# Patient Record
Sex: FEMALE | Race: OTHER | NOT HISPANIC OR LATINO
[De-identification: names, ages, dates, MRNs, and addresses within clinical notes are randomized per-mention and may not be internally consistent; named-entity substitution may affect disease eponyms.]

---

## 2019-03-26 PROBLEM — Z00.00 ENCOUNTER FOR PREVENTIVE HEALTH EXAMINATION: Noted: 2019-03-26

## 2019-03-27 ENCOUNTER — APPOINTMENT (OUTPATIENT)
Dept: GASTROENTEROLOGY | Facility: CLINIC | Age: 69
End: 2019-03-27

## 2019-04-15 ENCOUNTER — APPOINTMENT (OUTPATIENT)
Dept: GASTROENTEROLOGY | Facility: CLINIC | Age: 69
End: 2019-04-15

## 2019-05-13 ENCOUNTER — APPOINTMENT (OUTPATIENT)
Dept: GASTROENTEROLOGY | Facility: CLINIC | Age: 69
End: 2019-05-13
Payer: MEDICARE

## 2019-05-13 VITALS
BODY MASS INDEX: 21.77 KG/M2 | HEART RATE: 72 BPM | HEIGHT: 59 IN | OXYGEN SATURATION: 98 % | SYSTOLIC BLOOD PRESSURE: 172 MMHG | WEIGHT: 108 LBS | DIASTOLIC BLOOD PRESSURE: 81 MMHG | RESPIRATION RATE: 14 BRPM

## 2019-05-13 DIAGNOSIS — Z12.11 ENCOUNTER FOR SCREENING FOR MALIGNANT NEOPLASM OF COLON: ICD-10-CM

## 2019-05-13 DIAGNOSIS — R11.0 NAUSEA: ICD-10-CM

## 2019-05-13 DIAGNOSIS — R12 HEARTBURN: ICD-10-CM

## 2019-05-13 DIAGNOSIS — Z80.0 FAMILY HISTORY OF MALIGNANT NEOPLASM OF DIGESTIVE ORGANS: ICD-10-CM

## 2019-05-13 PROCEDURE — 99204 OFFICE O/P NEW MOD 45 MIN: CPT

## 2019-05-13 NOTE — PHYSICAL EXAM
[General Appearance - In No Acute Distress] : in no acute distress [General Appearance - Alert] : alert [Sclera] : the sclera and conjunctiva were normal [Extraocular Movements] : extraocular movements were intact [Outer Ear] : the ears and nose were normal in appearance [Hearing Threshold Finger Rub Not Swift] : hearing was normal [Neck Appearance] : the appearance of the neck was normal [Neck Cervical Mass (___cm)] : no neck mass was observed [Auscultation Breath Sounds / Voice Sounds] : lungs were clear to auscultation bilaterally [Heart Rate And Rhythm] : heart rate was normal and rhythm regular [Heart Sounds] : normal S1 and S2 [Murmurs] : no murmurs [Heart Sounds Gallop] : no gallops [Heart Sounds Pericardial Friction Rub] : no pericardial rub [Bowel Sounds] : normal bowel sounds [Abdomen Tenderness] : non-tender [Abdomen Soft] : soft [Abdomen Mass (___ Cm)] : no abdominal mass palpated [Cervical Lymph Nodes Enlarged Posterior Bilaterally] : posterior cervical [Cervical Lymph Nodes Enlarged Anterior Bilaterally] : anterior cervical [Abnormal Walk] : normal gait [Supraclavicular Lymph Nodes Enlarged Bilaterally] : supraclavicular [Nail Clubbing] : no clubbing  or cyanosis of the fingernails [Skin Color & Pigmentation] : normal skin color and pigmentation [] : no rash [Impaired Insight] : insight and judgment were intact [Oriented To Time, Place, And Person] : oriented to person, place, and time [Affect] : the affect was normal

## 2019-05-14 NOTE — ASSESSMENT
[FreeTextEntry1] : 69 y/o woman from [place], family hx of stomach cancer, who presents requesting an upper endoscopy and colonoscopy.  Patient with nausea, intermittent heartburn, and fecal incontinence.  \par \par I will plan for an upper endoscopy under monitored anesthesia care to rule out peptic ulcer disease, H.pylori gastritis, lesion etc.  I will plan for a colonoscopy to rule out colon polyps, colorectal cancer etc. under monitored anesthesia care.  Risks such as perforation, bleeding, infection, colitis, etc and risks of anesthesia were discussed with patient with daughter translating over the phone. Patient understands and agrees to proceed with the planned procedure.  Her blood pressure was high today and she will get medical clearance.\par \par Metamucil once a day and advance to twice a day.\par

## 2019-05-14 NOTE — HISTORY OF PRESENT ILLNESS
[de-identified] : Daughter provided translation over the phone.  \par \par 67 y/o woman from [place], family hx of stomach cancer, who presents requesting an upper endoscopy and colonoscopy.  \par \par Patient reports feeling nauseas. At times has heartburn. \par \par Complains of fecal incontinence since the past couple of months.  At times has a BM every other day or at times three times a day.  Patient has had 4 vaginal births. \par \par Pt denies having abdominal pain, dysphagia, odynophagia, vomiting, fevers, chills, jaundice, loss of appetite, wt loss, hematochezia and melena.\par \par She an upper endoscopy and colonoscopy 10 years ago.  \par \par Her mother had stomach cancer.  Patient denies family hx of other GI cancers.\par \par All other review of systems are negative.  Denies cardiac symptoms.\par \par

## 2019-05-27 ENCOUNTER — TRANSCRIPTION ENCOUNTER (OUTPATIENT)
Age: 69
End: 2019-05-27

## 2019-05-28 ENCOUNTER — OUTPATIENT (OUTPATIENT)
Dept: OUTPATIENT SERVICES | Facility: HOSPITAL | Age: 69
LOS: 1 days | End: 2019-05-28
Payer: COMMERCIAL

## 2019-05-28 ENCOUNTER — APPOINTMENT (OUTPATIENT)
Dept: GASTROENTEROLOGY | Facility: HOSPITAL | Age: 69
End: 2019-05-28

## 2019-05-28 ENCOUNTER — RESULT REVIEW (OUTPATIENT)
Age: 69
End: 2019-05-28

## 2019-05-28 DIAGNOSIS — Z12.11 ENCOUNTER FOR SCREENING FOR MALIGNANT NEOPLASM OF COLON: ICD-10-CM

## 2019-05-28 DIAGNOSIS — K25.9 GASTRIC ULCER, UNSPECIFIED AS ACUTE OR CHRONIC, W/OUT HEMORRHAGE OR PERFORATION: ICD-10-CM

## 2019-05-28 DIAGNOSIS — R12 HEARTBURN: ICD-10-CM

## 2019-05-28 PROCEDURE — 45378 DIAGNOSTIC COLONOSCOPY: CPT

## 2019-05-28 PROCEDURE — 43239 EGD BIOPSY SINGLE/MULTIPLE: CPT

## 2019-05-28 PROCEDURE — 82962 GLUCOSE BLOOD TEST: CPT

## 2019-05-28 PROCEDURE — 45378 DIAGNOSTIC COLONOSCOPY: CPT | Mod: 33

## 2020-12-21 PROBLEM — Z12.11 ENCOUNTER FOR SCREENING COLONOSCOPY: Status: RESOLVED | Noted: 2019-05-13 | Resolved: 2020-12-21

## 2023-12-16 ENCOUNTER — INPATIENT (INPATIENT)
Facility: HOSPITAL | Age: 73
LOS: 2 days | Discharge: HOME | DRG: 638 | End: 2023-12-19
Attending: HOSPITALIST | Admitting: INTERNAL MEDICINE
Payer: MEDICARE

## 2023-12-16 VITALS
OXYGEN SATURATION: 98 % | DIASTOLIC BLOOD PRESSURE: 65 MMHG | RESPIRATION RATE: 18 BRPM | SYSTOLIC BLOOD PRESSURE: 164 MMHG | HEART RATE: 88 BPM | TEMPERATURE: 98 F

## 2023-12-16 DIAGNOSIS — E78.5 HYPERLIPIDEMIA, UNSPECIFIED: ICD-10-CM

## 2023-12-16 DIAGNOSIS — E87.1 HYPO-OSMOLALITY AND HYPONATREMIA: ICD-10-CM

## 2023-12-16 DIAGNOSIS — I10 ESSENTIAL (PRIMARY) HYPERTENSION: ICD-10-CM

## 2023-12-16 DIAGNOSIS — E11.9 TYPE 2 DIABETES MELLITUS WITHOUT COMPLICATIONS: ICD-10-CM

## 2023-12-16 DIAGNOSIS — E03.9 HYPOTHYROIDISM, UNSPECIFIED: ICD-10-CM

## 2023-12-16 LAB
ACETONE SERPL-MCNC: NEGATIVE — SIGNIFICANT CHANGE UP
ACETONE SERPL-MCNC: NEGATIVE — SIGNIFICANT CHANGE UP
ALBUMIN SERPL ELPH-MCNC: 4.1 G/DL — SIGNIFICANT CHANGE UP (ref 3.3–5.2)
ALBUMIN SERPL ELPH-MCNC: 4.1 G/DL — SIGNIFICANT CHANGE UP (ref 3.3–5.2)
ALP SERPL-CCNC: 66 U/L — SIGNIFICANT CHANGE UP (ref 40–120)
ALP SERPL-CCNC: 66 U/L — SIGNIFICANT CHANGE UP (ref 40–120)
ALT FLD-CCNC: 9 U/L — SIGNIFICANT CHANGE UP
ALT FLD-CCNC: 9 U/L — SIGNIFICANT CHANGE UP
ANION GAP SERPL CALC-SCNC: 15 MMOL/L — SIGNIFICANT CHANGE UP (ref 5–17)
ANION GAP SERPL CALC-SCNC: 15 MMOL/L — SIGNIFICANT CHANGE UP (ref 5–17)
APPEARANCE UR: CLEAR — SIGNIFICANT CHANGE UP
APPEARANCE UR: CLEAR — SIGNIFICANT CHANGE UP
AST SERPL-CCNC: 13 U/L — SIGNIFICANT CHANGE UP
AST SERPL-CCNC: 13 U/L — SIGNIFICANT CHANGE UP
BASE EXCESS BLDV CALC-SCNC: -0.2 MMOL/L — SIGNIFICANT CHANGE UP (ref -2–3)
BASE EXCESS BLDV CALC-SCNC: -0.2 MMOL/L — SIGNIFICANT CHANGE UP (ref -2–3)
BASOPHILS # BLD AUTO: 0.08 K/UL — SIGNIFICANT CHANGE UP (ref 0–0.2)
BASOPHILS # BLD AUTO: 0.08 K/UL — SIGNIFICANT CHANGE UP (ref 0–0.2)
BASOPHILS NFR BLD AUTO: 0.9 % — SIGNIFICANT CHANGE UP (ref 0–2)
BASOPHILS NFR BLD AUTO: 0.9 % — SIGNIFICANT CHANGE UP (ref 0–2)
BILIRUB SERPL-MCNC: 0.4 MG/DL — SIGNIFICANT CHANGE UP (ref 0.4–2)
BILIRUB SERPL-MCNC: 0.4 MG/DL — SIGNIFICANT CHANGE UP (ref 0.4–2)
BILIRUB UR-MCNC: NEGATIVE — SIGNIFICANT CHANGE UP
BILIRUB UR-MCNC: NEGATIVE — SIGNIFICANT CHANGE UP
BUN SERPL-MCNC: 15.5 MG/DL — SIGNIFICANT CHANGE UP (ref 8–20)
BUN SERPL-MCNC: 15.5 MG/DL — SIGNIFICANT CHANGE UP (ref 8–20)
CALCIUM SERPL-MCNC: 9.1 MG/DL — SIGNIFICANT CHANGE UP (ref 8.4–10.5)
CALCIUM SERPL-MCNC: 9.1 MG/DL — SIGNIFICANT CHANGE UP (ref 8.4–10.5)
CHLORIDE SERPL-SCNC: 85 MMOL/L — LOW (ref 96–108)
CHLORIDE SERPL-SCNC: 85 MMOL/L — LOW (ref 96–108)
CO2 SERPL-SCNC: 22 MMOL/L — SIGNIFICANT CHANGE UP (ref 22–29)
CO2 SERPL-SCNC: 22 MMOL/L — SIGNIFICANT CHANGE UP (ref 22–29)
COLOR SPEC: YELLOW — SIGNIFICANT CHANGE UP
COLOR SPEC: YELLOW — SIGNIFICANT CHANGE UP
CREAT ?TM UR-MCNC: 12 MG/DL — SIGNIFICANT CHANGE UP
CREAT ?TM UR-MCNC: 12 MG/DL — SIGNIFICANT CHANGE UP
CREAT SERPL-MCNC: 0.59 MG/DL — SIGNIFICANT CHANGE UP (ref 0.5–1.3)
CREAT SERPL-MCNC: 0.59 MG/DL — SIGNIFICANT CHANGE UP (ref 0.5–1.3)
DIFF PNL FLD: NEGATIVE — SIGNIFICANT CHANGE UP
DIFF PNL FLD: NEGATIVE — SIGNIFICANT CHANGE UP
EGFR: 94 ML/MIN/1.73M2 — SIGNIFICANT CHANGE UP
EGFR: 94 ML/MIN/1.73M2 — SIGNIFICANT CHANGE UP
EOSINOPHIL # BLD AUTO: 0.15 K/UL — SIGNIFICANT CHANGE UP (ref 0–0.5)
EOSINOPHIL # BLD AUTO: 0.15 K/UL — SIGNIFICANT CHANGE UP (ref 0–0.5)
EOSINOPHIL NFR BLD AUTO: 1.6 % — SIGNIFICANT CHANGE UP (ref 0–6)
EOSINOPHIL NFR BLD AUTO: 1.6 % — SIGNIFICANT CHANGE UP (ref 0–6)
GAS PNL BLDV: SIGNIFICANT CHANGE UP
GAS PNL BLDV: SIGNIFICANT CHANGE UP
GLUCOSE BLDC GLUCOMTR-MCNC: 203 MG/DL — HIGH (ref 70–99)
GLUCOSE BLDC GLUCOMTR-MCNC: 203 MG/DL — HIGH (ref 70–99)
GLUCOSE SERPL-MCNC: 264 MG/DL — HIGH (ref 70–99)
GLUCOSE SERPL-MCNC: 264 MG/DL — HIGH (ref 70–99)
GLUCOSE UR QL: 500 MG/DL
GLUCOSE UR QL: 500 MG/DL
HCO3 BLDV-SCNC: 25 MMOL/L — SIGNIFICANT CHANGE UP (ref 22–29)
HCO3 BLDV-SCNC: 25 MMOL/L — SIGNIFICANT CHANGE UP (ref 22–29)
HCT VFR BLD CALC: 34.1 % — LOW (ref 34.5–45)
HCT VFR BLD CALC: 34.1 % — LOW (ref 34.5–45)
HGB BLD-MCNC: 11.5 G/DL — SIGNIFICANT CHANGE UP (ref 11.5–15.5)
HGB BLD-MCNC: 11.5 G/DL — SIGNIFICANT CHANGE UP (ref 11.5–15.5)
IMM GRANULOCYTES NFR BLD AUTO: 0.4 % — SIGNIFICANT CHANGE UP (ref 0–0.9)
IMM GRANULOCYTES NFR BLD AUTO: 0.4 % — SIGNIFICANT CHANGE UP (ref 0–0.9)
KETONES UR-MCNC: NEGATIVE MG/DL — SIGNIFICANT CHANGE UP
KETONES UR-MCNC: NEGATIVE MG/DL — SIGNIFICANT CHANGE UP
LEUKOCYTE ESTERASE UR-ACNC: NEGATIVE — SIGNIFICANT CHANGE UP
LEUKOCYTE ESTERASE UR-ACNC: NEGATIVE — SIGNIFICANT CHANGE UP
LYMPHOCYTES # BLD AUTO: 1.24 K/UL — SIGNIFICANT CHANGE UP (ref 1–3.3)
LYMPHOCYTES # BLD AUTO: 1.24 K/UL — SIGNIFICANT CHANGE UP (ref 1–3.3)
LYMPHOCYTES # BLD AUTO: 13.3 % — SIGNIFICANT CHANGE UP (ref 13–44)
LYMPHOCYTES # BLD AUTO: 13.3 % — SIGNIFICANT CHANGE UP (ref 13–44)
MAGNESIUM SERPL-MCNC: 1.5 MG/DL — LOW (ref 1.6–2.6)
MAGNESIUM SERPL-MCNC: 1.5 MG/DL — LOW (ref 1.6–2.6)
MCHC RBC-ENTMCNC: 26.6 PG — LOW (ref 27–34)
MCHC RBC-ENTMCNC: 26.6 PG — LOW (ref 27–34)
MCHC RBC-ENTMCNC: 33.7 GM/DL — SIGNIFICANT CHANGE UP (ref 32–36)
MCHC RBC-ENTMCNC: 33.7 GM/DL — SIGNIFICANT CHANGE UP (ref 32–36)
MCV RBC AUTO: 78.9 FL — LOW (ref 80–100)
MCV RBC AUTO: 78.9 FL — LOW (ref 80–100)
MONOCYTES # BLD AUTO: 0.62 K/UL — SIGNIFICANT CHANGE UP (ref 0–0.9)
MONOCYTES # BLD AUTO: 0.62 K/UL — SIGNIFICANT CHANGE UP (ref 0–0.9)
MONOCYTES NFR BLD AUTO: 6.6 % — SIGNIFICANT CHANGE UP (ref 2–14)
MONOCYTES NFR BLD AUTO: 6.6 % — SIGNIFICANT CHANGE UP (ref 2–14)
NEUTROPHILS # BLD AUTO: 7.2 K/UL — SIGNIFICANT CHANGE UP (ref 1.8–7.4)
NEUTROPHILS # BLD AUTO: 7.2 K/UL — SIGNIFICANT CHANGE UP (ref 1.8–7.4)
NEUTROPHILS NFR BLD AUTO: 77.2 % — HIGH (ref 43–77)
NEUTROPHILS NFR BLD AUTO: 77.2 % — HIGH (ref 43–77)
NITRITE UR-MCNC: NEGATIVE — SIGNIFICANT CHANGE UP
NITRITE UR-MCNC: NEGATIVE — SIGNIFICANT CHANGE UP
OSMOLALITY UR: 225 MOSM/KG — LOW (ref 300–1000)
OSMOLALITY UR: 225 MOSM/KG — LOW (ref 300–1000)
PCO2 BLDV: 41 MMHG — SIGNIFICANT CHANGE UP (ref 39–42)
PCO2 BLDV: 41 MMHG — SIGNIFICANT CHANGE UP (ref 39–42)
PH BLDV: 7.39 — SIGNIFICANT CHANGE UP (ref 7.32–7.43)
PH BLDV: 7.39 — SIGNIFICANT CHANGE UP (ref 7.32–7.43)
PH UR: 7.5 — SIGNIFICANT CHANGE UP (ref 5–8)
PH UR: 7.5 — SIGNIFICANT CHANGE UP (ref 5–8)
PLATELET # BLD AUTO: 344 K/UL — SIGNIFICANT CHANGE UP (ref 150–400)
PLATELET # BLD AUTO: 344 K/UL — SIGNIFICANT CHANGE UP (ref 150–400)
PO2 BLDV: 67 MMHG — HIGH (ref 25–45)
PO2 BLDV: 67 MMHG — HIGH (ref 25–45)
POTASSIUM SERPL-MCNC: 4.2 MMOL/L — SIGNIFICANT CHANGE UP (ref 3.5–5.3)
POTASSIUM SERPL-MCNC: 4.2 MMOL/L — SIGNIFICANT CHANGE UP (ref 3.5–5.3)
POTASSIUM SERPL-SCNC: 4.2 MMOL/L — SIGNIFICANT CHANGE UP (ref 3.5–5.3)
POTASSIUM SERPL-SCNC: 4.2 MMOL/L — SIGNIFICANT CHANGE UP (ref 3.5–5.3)
POTASSIUM UR-SCNC: 19 MMOL/L — SIGNIFICANT CHANGE UP
POTASSIUM UR-SCNC: 19 MMOL/L — SIGNIFICANT CHANGE UP
PROT SERPL-MCNC: 6.6 G/DL — SIGNIFICANT CHANGE UP (ref 6.6–8.7)
PROT SERPL-MCNC: 6.6 G/DL — SIGNIFICANT CHANGE UP (ref 6.6–8.7)
PROT UR-MCNC: NEGATIVE MG/DL — SIGNIFICANT CHANGE UP
PROT UR-MCNC: NEGATIVE MG/DL — SIGNIFICANT CHANGE UP
RBC # BLD: 4.32 M/UL — SIGNIFICANT CHANGE UP (ref 3.8–5.2)
RBC # BLD: 4.32 M/UL — SIGNIFICANT CHANGE UP (ref 3.8–5.2)
RBC # FLD: 13.2 % — SIGNIFICANT CHANGE UP (ref 10.3–14.5)
RBC # FLD: 13.2 % — SIGNIFICANT CHANGE UP (ref 10.3–14.5)
SAO2 % BLDV: 94.2 % — SIGNIFICANT CHANGE UP
SAO2 % BLDV: 94.2 % — SIGNIFICANT CHANGE UP
SODIUM SERPL-SCNC: 122 MMOL/L — LOW (ref 135–145)
SODIUM SERPL-SCNC: 122 MMOL/L — LOW (ref 135–145)
SODIUM UR-SCNC: 65 MMOL/L — SIGNIFICANT CHANGE UP
SODIUM UR-SCNC: 65 MMOL/L — SIGNIFICANT CHANGE UP
SP GR SPEC: 1.01 — SIGNIFICANT CHANGE UP (ref 1–1.03)
SP GR SPEC: 1.01 — SIGNIFICANT CHANGE UP (ref 1–1.03)
T4 AB SER-ACNC: 7.4 UG/DL — SIGNIFICANT CHANGE UP (ref 4.5–12)
T4 AB SER-ACNC: 7.4 UG/DL — SIGNIFICANT CHANGE UP (ref 4.5–12)
TSH SERPL-MCNC: 3.11 UIU/ML — SIGNIFICANT CHANGE UP (ref 0.27–4.2)
TSH SERPL-MCNC: 3.11 UIU/ML — SIGNIFICANT CHANGE UP (ref 0.27–4.2)
UROBILINOGEN FLD QL: 0.2 MG/DL — SIGNIFICANT CHANGE UP (ref 0.2–1)
UROBILINOGEN FLD QL: 0.2 MG/DL — SIGNIFICANT CHANGE UP (ref 0.2–1)
WBC # BLD: 9.33 K/UL — SIGNIFICANT CHANGE UP (ref 3.8–10.5)
WBC # BLD: 9.33 K/UL — SIGNIFICANT CHANGE UP (ref 3.8–10.5)
WBC # FLD AUTO: 9.33 K/UL — SIGNIFICANT CHANGE UP (ref 3.8–10.5)
WBC # FLD AUTO: 9.33 K/UL — SIGNIFICANT CHANGE UP (ref 3.8–10.5)

## 2023-12-16 PROCEDURE — 99285 EMERGENCY DEPT VISIT HI MDM: CPT

## 2023-12-16 PROCEDURE — 99223 1ST HOSP IP/OBS HIGH 75: CPT

## 2023-12-16 PROCEDURE — 93010 ELECTROCARDIOGRAM REPORT: CPT

## 2023-12-16 RX ORDER — GLUCAGON INJECTION, SOLUTION 0.5 MG/.1ML
1 INJECTION, SOLUTION SUBCUTANEOUS ONCE
Refills: 0 | Status: DISCONTINUED | OUTPATIENT
Start: 2023-12-16 | End: 2023-12-19

## 2023-12-16 RX ORDER — INSULIN DETEMIR 100/ML (3)
10 INSULIN PEN (ML) SUBCUTANEOUS ONCE
Refills: 0 | Status: COMPLETED | OUTPATIENT
Start: 2023-12-16 | End: 2023-12-16

## 2023-12-16 RX ORDER — INSULIN LISPRO 100/ML
VIAL (ML) SUBCUTANEOUS
Refills: 0 | Status: DISCONTINUED | OUTPATIENT
Start: 2023-12-16 | End: 2023-12-18

## 2023-12-16 RX ORDER — MAGNESIUM SULFATE 500 MG/ML
2 VIAL (ML) INJECTION ONCE
Refills: 0 | Status: COMPLETED | OUTPATIENT
Start: 2023-12-16 | End: 2023-12-16

## 2023-12-16 RX ORDER — INSULIN GLARGINE 100 [IU]/ML
10 INJECTION, SOLUTION SUBCUTANEOUS AT BEDTIME
Refills: 0 | Status: DISCONTINUED | OUTPATIENT
Start: 2023-12-17 | End: 2023-12-17

## 2023-12-16 RX ORDER — SODIUM CHLORIDE 9 MG/ML
1000 INJECTION, SOLUTION INTRAVENOUS
Refills: 0 | Status: DISCONTINUED | OUTPATIENT
Start: 2023-12-16 | End: 2023-12-19

## 2023-12-16 RX ORDER — TRAZODONE HCL 50 MG
50 TABLET ORAL AT BEDTIME
Refills: 0 | Status: DISCONTINUED | OUTPATIENT
Start: 2023-12-16 | End: 2023-12-19

## 2023-12-16 RX ORDER — ENOXAPARIN SODIUM 100 MG/ML
40 INJECTION SUBCUTANEOUS EVERY 24 HOURS
Refills: 0 | Status: DISCONTINUED | OUTPATIENT
Start: 2023-12-16 | End: 2023-12-19

## 2023-12-16 RX ORDER — AMLODIPINE BESYLATE 2.5 MG/1
10 TABLET ORAL DAILY
Refills: 0 | Status: DISCONTINUED | OUTPATIENT
Start: 2023-12-16 | End: 2023-12-19

## 2023-12-16 RX ORDER — DEXTROSE 50 % IN WATER 50 %
15 SYRINGE (ML) INTRAVENOUS ONCE
Refills: 0 | Status: DISCONTINUED | OUTPATIENT
Start: 2023-12-16 | End: 2023-12-19

## 2023-12-16 RX ORDER — LEVOTHYROXINE SODIUM 125 MCG
100 TABLET ORAL
Refills: 0 | Status: DISCONTINUED | OUTPATIENT
Start: 2023-12-16 | End: 2023-12-19

## 2023-12-16 RX ORDER — SODIUM CHLORIDE 9 MG/ML
1000 INJECTION INTRAMUSCULAR; INTRAVENOUS; SUBCUTANEOUS
Refills: 0 | Status: DISCONTINUED | OUTPATIENT
Start: 2023-12-16 | End: 2023-12-18

## 2023-12-16 RX ORDER — SODIUM CHLORIDE 9 MG/ML
1000 INJECTION, SOLUTION INTRAVENOUS ONCE
Refills: 0 | Status: COMPLETED | OUTPATIENT
Start: 2023-12-16 | End: 2023-12-16

## 2023-12-16 RX ORDER — DEXTROSE 50 % IN WATER 50 %
25 SYRINGE (ML) INTRAVENOUS ONCE
Refills: 0 | Status: DISCONTINUED | OUTPATIENT
Start: 2023-12-16 | End: 2023-12-19

## 2023-12-16 RX ORDER — LEVOTHYROXINE SODIUM 125 MCG
50 TABLET ORAL
Refills: 0 | Status: DISCONTINUED | OUTPATIENT
Start: 2023-12-16 | End: 2023-12-19

## 2023-12-16 RX ORDER — DEXTROSE 50 % IN WATER 50 %
12.5 SYRINGE (ML) INTRAVENOUS ONCE
Refills: 0 | Status: DISCONTINUED | OUTPATIENT
Start: 2023-12-16 | End: 2023-12-19

## 2023-12-16 RX ADMIN — SODIUM CHLORIDE 1000 MILLILITER(S): 9 INJECTION, SOLUTION INTRAVENOUS at 15:52

## 2023-12-16 RX ADMIN — Medication 25 GRAM(S): at 20:58

## 2023-12-16 RX ADMIN — SODIUM CHLORIDE 100 MILLILITER(S): 9 INJECTION INTRAMUSCULAR; INTRAVENOUS; SUBCUTANEOUS at 23:24

## 2023-12-16 RX ADMIN — Medication 10 UNIT(S): at 21:32

## 2023-12-16 NOTE — ED ADULT TRIAGE NOTE - CHIEF COMPLAINT QUOTE
Pt lives in [place] (one [place] ago) and is c/o tingling and pain to the hands and feet. PMHx DM2, check her BG at home but states that she does not take her insulin daily.  in triage. Daughter is concerned for HTN, BP 170s at home.

## 2023-12-16 NOTE — H&P ADULT - NSHPPHYSICALEXAM_GEN_ALL_CORE
PHYSICAL EXAM:  Vital Signs:  Vital Signs (24 Hrs):  T(C): 36.4 (12-16-23 @ 13:13), Max: 36.4 (12-16-23 @ 13:13)  HR: 88 (12-16-23 @ 13:13) (88 - 88)  BP: 164/65 (12-16-23 @ 13:13) (164/65 - 164/65)  RR: 18 (12-16-23 @ 13:13) (18 - 18)  SpO2: 98% (12-16-23 @ 13:13) (98% - 98%)    General: well appearing/toxic appearing/chronically ill appearing and in no acute distress   HENT: atraumatic, normocephalic, oropharynx pink and moist, sinuses nontender, normal nasal mucosa/turbinates, thyroid not enlarged or tender, no radiating carotid murmur or bruit and no masses; no JVD  Eyes: pupil equally round and reactive to light (PERRLA) bilaterally, extra-ocular muscle intact (EOMI) bilaterally, lids/conjunctiva normal bilaterally and anicteric bilaterally  Neck: normal, supple, no adenopathy and thyroid normal in size, no nodules or tenderness  CV: normal s1, s2 , regular rhythm, no murmurs, no rubs and no gallops  Lungs: clear to auscultation no w/r/r  Abd: normal bowel sounds, no hepatosplenomegaly, non-tender, non-distended and no masses  Musculoskeletal: no clubbing, cyanosis and full range of motion of joints: right upper extremity, left upper extremity, right lower extremity and left lower extremity;  2+ Peripheral Pulses, No clubbing, cyanosis, or edema  Neuro: alert, awake & oriented times three (AA&O x 3), cranial Nerves II-XII intact and normal strength  Psych: normal judgment and insight, normal mood/affect and non-anxious  Vascular: 2+ radial and dorsalis pedis pulses B/L equal and symmetric  Skin: no rash, warm and dry PHYSICAL EXAM:  Vital Signs (24 Hrs):  T(C): 36.4 (12-16-23 @ 13:13), Max: 36.4 (12-16-23 @ 13:13)  HR: 88 (12-16-23 @ 13:13) (88 - 88)  BP: 164/65 (12-16-23 @ 13:13) (164/65 - 164/65)  RR: 18 (12-16-23 @ 13:13) (18 - 18)  SpO2: 98% (12-16-23 @ 13:13) (98% - 98%)    General: well appearing and in no acute distress, thin  HENT: atraumatic, normocephalic, oropharynx pink and moist, sinuses nontender, normal nasal mucosa/turbinates, thyroid not enlarged or tender, no radiating carotid murmur or bruit and no masses; no JVD  Eyes: pupil equally round and reactive to light (PERRLA) bilaterally, extra-ocular muscle intact (EOMI) bilaterally, lids/conjunctiva normal bilaterally and anicteric bilaterally  Neck: normal, supple, no adenopathy and thyroid normal in size, no nodules or tenderness  CV: normal s1, s2 , regular rhythm, no murmurs, no rubs and no gallops  Lungs: clear to auscultation no w/r/r  Abd: normal bowel sounds, no hepatosplenomegaly, non-tender, non-distended and no masses  Musculoskeletal: no clubbing, cyanosis and full range of motion of joints: right upper extremity, left upper extremity, right lower extremity and left lower extremity;  2+ Peripheral Pulses, No clubbing, cyanosis, or edema  Neuro: alert, awake & oriented times three (AA&O x 3), cranial Nerves II-XII intact and normal strength  Psych: normal judgment and insight, normal mood/affect and non-anxious  Vascular: 2+ radial and dorsalis pedis pulses B/L equal and symmetric  Skin: no rash, warm and dry

## 2023-12-16 NOTE — ED ADULT NURSE REASSESSMENT NOTE - NS ED NURSE REASSESS COMMENT FT1
Assumed care of patient and resting on stretcher quietly, patient and family informed plan  of care and states understanding. pt pending UA sample.

## 2023-12-16 NOTE — ED ADULT NURSE NOTE - NSFALLUNIVINTERV_ED_ALL_ED
Bed/Stretcher in lowest position, wheels locked, appropriate side rails in place/Call bell, personal items and telephone in reach/Instruct patient to call for assistance before getting out of bed/chair/stretcher/Non-slip footwear applied when patient is off stretcher/[place] to call system/Physically safe environment - no spills, clutter or unnecessary equipment/Purposeful proactive rounding/Room/bathroom lighting operational, light cord in reach Bed/Stretcher in lowest position, wheels locked, appropriate side rails in place/Call bell, personal items and telephone in reach/Instruct patient to call for assistance before getting out of bed/chair/stretcher/Non-slip footwear applied when patient is off stretcher/[place] to call system/Physically safe environment - no spills, clutter or unnecessary equipment/Purposeful proactive rounding/Room/bathroom lighting operational, light cord in reach

## 2023-12-16 NOTE — H&P ADULT - NSHPLABSRESULTS_GEN_ALL_CORE
labs personally reviewed and pertinent [facility] documents/labs/diagnostics reviewed                         11.5   9.33  )-----------( 344      ( 16 Dec 2023 16:00 )             34.1       12    122<L>  |  85<L>  |  15.5  ----------------------------<  264<H>  4.2   |  22.0  |  0.59    Ca    9.1      16 Dec 2023 16:00  Mg     1.5         TPro  6.6  /  Alb  4.1  /  TBili  0.4  /  DBili  x   /  AST  13  /  ALT  9   /  AlkPhos  66      16:00 - VBG - pH: 7.390 | pCO2: 41    | pO2: 67    | Lactate:          Urinalysis Basic - ( 16 Dec 2023 18:00 )    Color: Yellow / Appearance: Clear / S.007 / pH: x  Gluc: x / Ketone: Negative mg/dL  / Bili: Negative / Urobili: 0.2 mg/dL   Blood: x / Protein: Negative mg/dL / Nitrite: Negative   Leuk Esterase: Negative / RBC: x / WBC x   Sq Epi: x / Non Sq Epi: x / Bacteria: x    EKG: read by me, NSR at 71 bpm, no ST changes, TWI in lead 3 and V3 labs personally reviewed and pertinent [facility] documents/labs/diagnostics reviewed                         11.5   9.33  )-----------( 344      ( 16 Dec 2023 16:00 )             34.1       12    122<L>  |  85<L>  |  15.5  ----------------------------<  264<H>  4.2   |  22.0  |  0.59    Ca    9.1      16 Dec 2023 16:00  Mg     1.5         TPro  6.6  /  Alb  4.1  /  TBili  0.4  /  DBili  x   /  AST  13  /  ALT  9   /  AlkPhos  66      16:00 - VBG - pH: 7.390 | pCO2: 41    | pO2: 67    | Lactate:          Urinalysis Basic - ( 16 Dec 2023 18:00 )    Color: Yellow / Appearance: Clear / S.007 / pH: x  Gluc: x / Ketone: Negative mg/dL  / Bili: Negative / Urobili: 0.2 mg/dL   Blood: x / Protein: Negative mg/dL / Nitrite: Negative   Leuk Esterase: Negative / RBC: x / WBC x   Sq Epi: x / Non Sq Epi: x / Bacteria: x    EKG: read by me, NSR at 71 bpm, no ST changes, TWI in lead 3 and V3

## 2023-12-16 NOTE — ED PROVIDER NOTE - OBJECTIVE STATEMENT
74yo female with pmh of HTN, DM, HLD, depression presents with hyperglycemia, parasthesias, urinary frequency, generalized weakness. Pt for the past 2 months has not been taking her insulin because she states the needle sticks were causing her skin to get hard in the area she stuck herself. Pt for the past month with generalized weakness, parasthesias to the hands and feet and inc urination. Pt also stating her BP has been high at home at times 180s. Pt's  passed away a month ago, and daughter states since then patient feeling worse. Pt denies fevers/chills, ha, loc, focal neuro deficits, cp/sob/palp, cough, abd pain/n/v/d, dysuria, hematuria and sick contacts.

## 2023-12-16 NOTE — ED PROVIDER NOTE - CARE PLAN
1 Principal Discharge DX:	Hyponatremia  Secondary Diagnosis:	Hyperglycemia  Secondary Diagnosis:	Hypomagnesemia

## 2023-12-16 NOTE — H&P ADULT - NSICDXPASTMEDICALHX_GEN_ALL_CORE_FT
PAST MEDICAL HISTORY:  Benign essential HTN     HLD (hyperlipidemia)     Type 2 diabetes mellitus      PAST MEDICAL HISTORY:  Benign essential HTN     HLD (hyperlipidemia)     Primary hypothyroidism     Type 2 diabetes mellitus

## 2023-12-16 NOTE — H&P ADULT - NSHPREVIEWOFSYSTEMS_GEN_ALL_CORE
She denies any fever, chills, changes in vision, changes in hearing, headaches, cough, sore throat, rhinorrhea, chest pain, SOB, palpitations, abdominal pain, nausea, vomiting, dysuria, diarrhea, weakness, lethargy, paresthesias, lightheadedness, leg swelling, orthopnea, PND, sick contact or recent travel. At baseline, he/she does not use any devices to assist with ambulation. She denies any fever, chills, cough, sore throat, rhinorrhea, chest pain, SOB, abdominal pain, nausea, vomiting, dysuria, diarrhea, weakness, lethargy, paresthesias, lightheadedness, leg swelling, orthopnea, PND, sick contact or recent travel. At baseline, he/she does not use any devices to assist with ambulation.

## 2023-12-16 NOTE — ED PROVIDER NOTE - CLINICAL SUMMARY MEDICAL DECISION MAKING FREE TEXT BOX
74yo female with pmh of HTN, DM, HLD, depression presents with hyperglycemia, parasthesias, urinary frequency, generalized weakness. 72yo female with pmh of HTN, DM, HLD, depression presents with hyperglycemia, parasthesias, urinary frequency, generalized weakness. 72yo female with pmh of HTN, DM, HLD, depression presents with hyperglycemia, parasthesias, urinary frequency, generalized weakness. Pt with hypona as well as hypomag, given fluids and mag, pt hyperglycemic home insulin ordered, admit for further managment

## 2023-12-16 NOTE — H&P ADULT - HISTORY OF PRESENT ILLNESS
This is a 73 year old Portuguese speaking female w/ PMHx of HTN, HLD and DM2 presents for evaluation of  This is a 73 year old Upper sorbian speaking female w/ PMHx of HTN, HLD and DM2 presents for evaluation of  This is a 73 year old Iraqi speaking female - daughter wishes to translate w/ PMHx of hypothyroidism, nephrolithiasis /P lithotripsy), HTN, HLD and DM2 - noncompliant with meds X 3 months presents for evaluation of sudden onset palpitations, lasting about 1 hour.  Associated with B/L feet paresthesias > hand paresthesias.  She denies any previous episodes, now resolved.  States she has been prescribed levemir X 6 months, however discontinued 3 months ago as she developed nodules at injection sites.  Also was placed on levemir 6 months prior as her hyperglycemia was uncontrolled on "3 pills" - unknown which.  +dizziness - which she currently has, and has continued X 1 month.  +headache B/L radiating to her temporal area B/L, intermittently X 1 month.  Tried "over the counter meds" unknown which without relief.  +nausea.  +letargy X 2 days.  +10 pound weight loss unintentional weight loss.   Of note her  passed away unexpectedly 1 month prior.   +frequency of urination 1 month prior, was treated with unknown antibiotics without relief.  Of note patient is visiting from [place] X 1 month.   This is a 73 year old Indonesian speaking female - daughter wishes to translate w/ PMHx of hypothyroidism, nephrolithiasis /P lithotripsy), HTN, HLD and DM2 - noncompliant with meds X 3 months presents for evaluation of sudden onset palpitations, lasting about 1 hour.  Associated with B/L feet paresthesias > hand paresthesias.  She denies any previous episodes, now resolved.  States she has been prescribed levemir X 6 months, however discontinued 3 months ago as she developed nodules at injection sites.  Also was placed on levemir 6 months prior as her hyperglycemia was uncontrolled on "3 pills" - unknown which.  +dizziness - which she currently has, and has continued X 1 month.  +headache B/L radiating to her temporal area B/L, intermittently X 1 month.  Tried "over the counter meds" unknown which without relief.  +nausea.  +letargy X 2 days.  +10 pound weight loss unintentional weight loss.   Of note her  passed away unexpectedly 1 month prior.   +frequency of urination 1 month prior, was treated with unknown antibiotics without relief.  Of note patient is visiting from [place] X 1 month.

## 2023-12-16 NOTE — H&P ADULT - ASSESSMENT
ASSESSMENT:  ***    PLAN:  1.Uncontolled DM - likely secondary to noncompliance w/ hx of DM2  -afebrile, no leukocytosis  -EKG as above  -S/P 1L LR in ED, will switch to NS for maintenance  -LFTs within normal limits  -initial glucose 306  ---> 264 on serum --->203   -S/P 10 units levemir X 1 in ER  -sodium 122, will f/u serum osm, urine osm, urine sodium and urine cr  -chloride 85  -LFTs within normal limits  -TSH 3.11  -T4 7.4  -magnesium 1.5 S/P 2g IV magnesium X 1 in ER, will f/u repeat in AM  -AG 15  -will f/u acetone  -7.39/41/67/25  -U/A as above  -will continue fall risk precautions  -will f/u HBA1c    2.DVT PPx  -will place B/L LE SCDs ASSESSMENT:  This is a 73 year old Greek speaking female - daughter wishes to translate w/ PMHx of hypothyroidism, nephrolithiasis /P lithotripsy), HTN, HLD and DM2 - noncompliant with meds X 3 months presents for evaluation of sudden onset palpitations, lasting about 1 hour.      PLAN:  1.Uncontolled DM - likely secondary to noncompliance w/ hx of DM2  -afebrile, no leukocytosis  -EKG as above  -S/P 1L LR in ED, will switch to NS for maintenance  -LFTs within normal limits  -initial glucose 306  ---> 264 on serum --->203   -S/P 10 units levemir X 1 in ER  -sodium 122, will f/u serum osm, urine osm, urine sodium and urine cr  -chloride 85  -LFTs within normal limits  -TSH 3.11  -T4 7.4  -magnesium 1.5 S/P 2g IV magnesium X 1 in ER, will f/u repeat in AM  -AG 15  -will f/u acetone  -7.39/41/67/25  -U/A as above  -will f/u HBA1c  -is on diamicron gliclizide 60mg QD and janumet  bid - will hold for now  -will continue levemir 10units QHS  -will place on fall precautions  -will f/u orthostats  -will f/u initial troponin  -will f/u CXRAY  -will f/u vitamin b12    2.Hypothyroidism  -will continue levothyroxine 100mg biw and 50mg 5 x week    3.HTN  -will continue norvasc 10mg QD    4.HLD  -will f/u lipid panel    5.DVT PPx  -will place B/L LE SCDs and start lovenox 40mg SQ QD ASSESSMENT:  This is a 73 year old Lao speaking female - daughter wishes to translate w/ PMHx of hypothyroidism, nephrolithiasis /P lithotripsy), HTN, HLD and DM2 - noncompliant with meds X 3 months presents for evaluation of sudden onset palpitations, lasting about 1 hour.      PLAN:  1.Uncontolled DM - likely secondary to noncompliance w/ hx of DM2  -afebrile, no leukocytosis  -EKG as above  -S/P 1L LR in ED, will switch to NS for maintenance  -LFTs within normal limits  -initial glucose 306  ---> 264 on serum --->203   -S/P 10 units levemir X 1 in ER  -sodium 122, will f/u serum osm, urine osm, urine sodium and urine cr  -chloride 85  -LFTs within normal limits  -TSH 3.11  -T4 7.4  -magnesium 1.5 S/P 2g IV magnesium X 1 in ER, will f/u repeat in AM  -AG 15  -will f/u acetone  -7.39/41/67/25  -U/A as above  -will f/u HBA1c  -is on diamicron gliclizide 60mg QD and janumet  bid - will hold for now  -will continue levemir 10units QHS  -will place on fall precautions  -will f/u orthostats  -will f/u initial troponin  -will f/u CXRAY  -will f/u vitamin b12    2.Hypothyroidism  -will continue levothyroxine 100mg biw and 50mg 5 x week    3.HTN  -will continue norvasc 10mg QD    4.HLD  -will f/u lipid panel    5.DVT PPx  -will place B/L LE SCDs and start lovenox 40mg SQ QD

## 2023-12-16 NOTE — ED ADULT NURSE NOTE - OBJECTIVE STATEMENT
Pt a&ox4 comes to Emergency Department with daughter complaining of tingling or hands and feet, anxiety, and chest tenderness. Pt's daughter states her father has just passed away from heart attack and her mother has been very anxious since. Pt's VSS, mildly hypertensive at triage, respirations even and unlabored on room air, no distress noted.

## 2023-12-16 NOTE — ED PROVIDER NOTE - PHYSICAL EXAMINATION
Const: Awake, alert and oriented. In no acute distress. Well appearing.  HEENT: NC/AT. Moist mucous membranes.  Eyes: No scleral icterus. EOMI.  Neck:. Soft and supple. Full ROM without pain.  Cardiac: Regular rate and regular rhythm. +S1/S2. Peripheral pulses 2+ and symmetric. No LE edema.  Resp: Speaking in full sentences. No evidence of respiratory distress. No wheezes, rales or rhonchi.  Abd: Soft, non-tender, non-distended. Normal bowel sounds in all 4 quadrants. No guarding or rebound.  Back: Spine midline and non-tender. No CVAT.  Skin: No rashes, abrasions or lacerations.  Lymph: No cervical lymphadenopathy.  Neuro: A&Ox3, moving all extremities symmetrically, follows commands, motor mabel upper and lower ext 5/5, sensory symm and intact CN 2-12 grossly intact, no ataxia, no nystagmus, no dysmetria, no ddk, symm mabel, no pronator drift

## 2023-12-17 LAB
A1C WITH ESTIMATED AVERAGE GLUCOSE RESULT: 8.3 % — HIGH (ref 4–5.6)
A1C WITH ESTIMATED AVERAGE GLUCOSE RESULT: 8.3 % — HIGH (ref 4–5.6)
ANION GAP SERPL CALC-SCNC: 12 MMOL/L — SIGNIFICANT CHANGE UP (ref 5–17)
APTT BLD: 30 SEC — SIGNIFICANT CHANGE UP (ref 24.5–35.6)
APTT BLD: 30 SEC — SIGNIFICANT CHANGE UP (ref 24.5–35.6)
BUN SERPL-MCNC: 10.6 MG/DL — SIGNIFICANT CHANGE UP (ref 8–20)
BUN SERPL-MCNC: 10.6 MG/DL — SIGNIFICANT CHANGE UP (ref 8–20)
BUN SERPL-MCNC: 11.2 MG/DL — SIGNIFICANT CHANGE UP (ref 8–20)
BUN SERPL-MCNC: 11.2 MG/DL — SIGNIFICANT CHANGE UP (ref 8–20)
CALCIUM SERPL-MCNC: 8.1 MG/DL — LOW (ref 8.4–10.5)
CALCIUM SERPL-MCNC: 8.1 MG/DL — LOW (ref 8.4–10.5)
CALCIUM SERPL-MCNC: 8.9 MG/DL — SIGNIFICANT CHANGE UP (ref 8.4–10.5)
CALCIUM SERPL-MCNC: 8.9 MG/DL — SIGNIFICANT CHANGE UP (ref 8.4–10.5)
CHLORIDE SERPL-SCNC: 101 MMOL/L — SIGNIFICANT CHANGE UP (ref 96–108)
CHLORIDE SERPL-SCNC: 101 MMOL/L — SIGNIFICANT CHANGE UP (ref 96–108)
CHLORIDE SERPL-SCNC: 95 MMOL/L — LOW (ref 96–108)
CHLORIDE SERPL-SCNC: 95 MMOL/L — LOW (ref 96–108)
CHOLEST SERPL-MCNC: 164 MG/DL — SIGNIFICANT CHANGE UP
CHOLEST SERPL-MCNC: 164 MG/DL — SIGNIFICANT CHANGE UP
CO2 SERPL-SCNC: 23 MMOL/L — SIGNIFICANT CHANGE UP (ref 22–29)
CO2 SERPL-SCNC: 23 MMOL/L — SIGNIFICANT CHANGE UP (ref 22–29)
CO2 SERPL-SCNC: 26 MMOL/L — SIGNIFICANT CHANGE UP (ref 22–29)
CO2 SERPL-SCNC: 26 MMOL/L — SIGNIFICANT CHANGE UP (ref 22–29)
CREAT ?TM UR-MCNC: 12 MG/DL — SIGNIFICANT CHANGE UP
CREAT ?TM UR-MCNC: 12 MG/DL — SIGNIFICANT CHANGE UP
CREAT SERPL-MCNC: 0.5 MG/DL — SIGNIFICANT CHANGE UP (ref 0.5–1.3)
CREAT SERPL-MCNC: 0.5 MG/DL — SIGNIFICANT CHANGE UP (ref 0.5–1.3)
CREAT SERPL-MCNC: 0.56 MG/DL — SIGNIFICANT CHANGE UP (ref 0.5–1.3)
CREAT SERPL-MCNC: 0.56 MG/DL — SIGNIFICANT CHANGE UP (ref 0.5–1.3)
CULTURE RESULTS: SIGNIFICANT CHANGE UP
CULTURE RESULTS: SIGNIFICANT CHANGE UP
EGFR: 95 ML/MIN/1.73M2 — SIGNIFICANT CHANGE UP
EGFR: 95 ML/MIN/1.73M2 — SIGNIFICANT CHANGE UP
EGFR: 98 ML/MIN/1.73M2 — SIGNIFICANT CHANGE UP
EGFR: 98 ML/MIN/1.73M2 — SIGNIFICANT CHANGE UP
ESTIMATED AVERAGE GLUCOSE: 192 MG/DL — HIGH (ref 68–114)
ESTIMATED AVERAGE GLUCOSE: 192 MG/DL — HIGH (ref 68–114)
GLUCOSE BLDC GLUCOMTR-MCNC: 163 MG/DL — HIGH (ref 70–99)
GLUCOSE BLDC GLUCOMTR-MCNC: 163 MG/DL — HIGH (ref 70–99)
GLUCOSE BLDC GLUCOMTR-MCNC: 187 MG/DL — HIGH (ref 70–99)
GLUCOSE BLDC GLUCOMTR-MCNC: 187 MG/DL — HIGH (ref 70–99)
GLUCOSE BLDC GLUCOMTR-MCNC: 218 MG/DL — HIGH (ref 70–99)
GLUCOSE BLDC GLUCOMTR-MCNC: 218 MG/DL — HIGH (ref 70–99)
GLUCOSE BLDC GLUCOMTR-MCNC: 77 MG/DL — SIGNIFICANT CHANGE UP (ref 70–99)
GLUCOSE BLDC GLUCOMTR-MCNC: 77 MG/DL — SIGNIFICANT CHANGE UP (ref 70–99)
GLUCOSE SERPL-MCNC: 279 MG/DL — HIGH (ref 70–99)
GLUCOSE SERPL-MCNC: 279 MG/DL — HIGH (ref 70–99)
GLUCOSE SERPL-MCNC: 60 MG/DL — LOW (ref 70–99)
GLUCOSE SERPL-MCNC: 60 MG/DL — LOW (ref 70–99)
HCT VFR BLD CALC: 33.8 % — LOW (ref 34.5–45)
HCT VFR BLD CALC: 33.8 % — LOW (ref 34.5–45)
HCV AB S/CO SERPL IA: 0.14 S/CO — SIGNIFICANT CHANGE UP (ref 0–0.99)
HCV AB S/CO SERPL IA: 0.14 S/CO — SIGNIFICANT CHANGE UP (ref 0–0.99)
HCV AB SERPL-IMP: SIGNIFICANT CHANGE UP
HCV AB SERPL-IMP: SIGNIFICANT CHANGE UP
HDLC SERPL-MCNC: 53 MG/DL — SIGNIFICANT CHANGE UP
HDLC SERPL-MCNC: 53 MG/DL — SIGNIFICANT CHANGE UP
HGB BLD-MCNC: 11.7 G/DL — SIGNIFICANT CHANGE UP (ref 11.5–15.5)
HGB BLD-MCNC: 11.7 G/DL — SIGNIFICANT CHANGE UP (ref 11.5–15.5)
INR BLD: 1.08 RATIO — SIGNIFICANT CHANGE UP (ref 0.85–1.18)
INR BLD: 1.08 RATIO — SIGNIFICANT CHANGE UP (ref 0.85–1.18)
LIPID PNL WITH DIRECT LDL SERPL: 103 MG/DL — HIGH
LIPID PNL WITH DIRECT LDL SERPL: 103 MG/DL — HIGH
MCHC RBC-ENTMCNC: 28 PG — SIGNIFICANT CHANGE UP (ref 27–34)
MCHC RBC-ENTMCNC: 28 PG — SIGNIFICANT CHANGE UP (ref 27–34)
MCHC RBC-ENTMCNC: 34.6 GM/DL — SIGNIFICANT CHANGE UP (ref 32–36)
MCHC RBC-ENTMCNC: 34.6 GM/DL — SIGNIFICANT CHANGE UP (ref 32–36)
MCV RBC AUTO: 80.9 FL — SIGNIFICANT CHANGE UP (ref 80–100)
MCV RBC AUTO: 80.9 FL — SIGNIFICANT CHANGE UP (ref 80–100)
NON HDL CHOLESTEROL: 111 MG/DL — SIGNIFICANT CHANGE UP
NON HDL CHOLESTEROL: 111 MG/DL — SIGNIFICANT CHANGE UP
OSMOLALITY SERPL: 284 MOSMOL/KG — SIGNIFICANT CHANGE UP (ref 280–301)
OSMOLALITY SERPL: 284 MOSMOL/KG — SIGNIFICANT CHANGE UP (ref 280–301)
PLATELET # BLD AUTO: 354 K/UL — SIGNIFICANT CHANGE UP (ref 150–400)
PLATELET # BLD AUTO: 354 K/UL — SIGNIFICANT CHANGE UP (ref 150–400)
POTASSIUM SERPL-MCNC: 3.8 MMOL/L — SIGNIFICANT CHANGE UP (ref 3.5–5.3)
POTASSIUM SERPL-MCNC: 3.8 MMOL/L — SIGNIFICANT CHANGE UP (ref 3.5–5.3)
POTASSIUM SERPL-MCNC: 4.4 MMOL/L — SIGNIFICANT CHANGE UP (ref 3.5–5.3)
POTASSIUM SERPL-MCNC: 4.4 MMOL/L — SIGNIFICANT CHANGE UP (ref 3.5–5.3)
POTASSIUM SERPL-SCNC: 3.8 MMOL/L — SIGNIFICANT CHANGE UP (ref 3.5–5.3)
POTASSIUM SERPL-SCNC: 3.8 MMOL/L — SIGNIFICANT CHANGE UP (ref 3.5–5.3)
POTASSIUM SERPL-SCNC: 4.4 MMOL/L — SIGNIFICANT CHANGE UP (ref 3.5–5.3)
POTASSIUM SERPL-SCNC: 4.4 MMOL/L — SIGNIFICANT CHANGE UP (ref 3.5–5.3)
PROT ?TM UR-MCNC: 7 MG/DL — SIGNIFICANT CHANGE UP (ref 0–12)
PROT ?TM UR-MCNC: 7 MG/DL — SIGNIFICANT CHANGE UP (ref 0–12)
PROT/CREAT UR-RTO: 0.6 RATIO — HIGH
PROT/CREAT UR-RTO: 0.6 RATIO — HIGH
PROTHROM AB SERPL-ACNC: 12 SEC — SIGNIFICANT CHANGE UP (ref 9.5–13)
PROTHROM AB SERPL-ACNC: 12 SEC — SIGNIFICANT CHANGE UP (ref 9.5–13)
RBC # BLD: 4.18 M/UL — SIGNIFICANT CHANGE UP (ref 3.8–5.2)
RBC # BLD: 4.18 M/UL — SIGNIFICANT CHANGE UP (ref 3.8–5.2)
RBC # FLD: 13.6 % — SIGNIFICANT CHANGE UP (ref 10.3–14.5)
RBC # FLD: 13.6 % — SIGNIFICANT CHANGE UP (ref 10.3–14.5)
SODIUM SERPL-SCNC: 133 MMOL/L — LOW (ref 135–145)
SODIUM SERPL-SCNC: 133 MMOL/L — LOW (ref 135–145)
SODIUM SERPL-SCNC: 136 MMOL/L — SIGNIFICANT CHANGE UP (ref 135–145)
SODIUM SERPL-SCNC: 136 MMOL/L — SIGNIFICANT CHANGE UP (ref 135–145)
SPECIMEN SOURCE: SIGNIFICANT CHANGE UP
SPECIMEN SOURCE: SIGNIFICANT CHANGE UP
TRIGL SERPL-MCNC: 39 MG/DL — SIGNIFICANT CHANGE UP
TRIGL SERPL-MCNC: 39 MG/DL — SIGNIFICANT CHANGE UP
TROPONIN T, HIGH SENSITIVITY RESULT: 7 NG/L — SIGNIFICANT CHANGE UP (ref 0–51)
TROPONIN T, HIGH SENSITIVITY RESULT: 7 NG/L — SIGNIFICANT CHANGE UP (ref 0–51)
UUN UR-MCNC: 145 MG/DL — SIGNIFICANT CHANGE UP
UUN UR-MCNC: 145 MG/DL — SIGNIFICANT CHANGE UP
VIT B12 SERPL-MCNC: 267 PG/ML — SIGNIFICANT CHANGE UP (ref 232–1245)
VIT B12 SERPL-MCNC: 267 PG/ML — SIGNIFICANT CHANGE UP (ref 232–1245)
WBC # BLD: 10.12 K/UL — SIGNIFICANT CHANGE UP (ref 3.8–10.5)
WBC # BLD: 10.12 K/UL — SIGNIFICANT CHANGE UP (ref 3.8–10.5)
WBC # FLD AUTO: 10.12 K/UL — SIGNIFICANT CHANGE UP (ref 3.8–10.5)
WBC # FLD AUTO: 10.12 K/UL — SIGNIFICANT CHANGE UP (ref 3.8–10.5)

## 2023-12-17 PROCEDURE — 72131 CT LUMBAR SPINE W/O DYE: CPT | Mod: 26

## 2023-12-17 PROCEDURE — 72125 CT NECK SPINE W/O DYE: CPT | Mod: 26

## 2023-12-17 PROCEDURE — 99254 IP/OBS CNSLTJ NEW/EST MOD 60: CPT

## 2023-12-17 PROCEDURE — 99222 1ST HOSP IP/OBS MODERATE 55: CPT

## 2023-12-17 PROCEDURE — 93010 ELECTROCARDIOGRAM REPORT: CPT

## 2023-12-17 PROCEDURE — 99233 SBSQ HOSP IP/OBS HIGH 50: CPT

## 2023-12-17 PROCEDURE — 71045 X-RAY EXAM CHEST 1 VIEW: CPT | Mod: 26

## 2023-12-17 RX ORDER — INSULIN GLARGINE 100 [IU]/ML
5 INJECTION, SOLUTION SUBCUTANEOUS AT BEDTIME
Refills: 0 | Status: DISCONTINUED | OUTPATIENT
Start: 2023-12-17 | End: 2023-12-19

## 2023-12-17 RX ORDER — FAMOTIDINE 10 MG/ML
20 INJECTION INTRAVENOUS DAILY
Refills: 0 | Status: DISCONTINUED | OUTPATIENT
Start: 2023-12-17 | End: 2023-12-19

## 2023-12-17 RX ORDER — LANOLIN ALCOHOL/MO/W.PET/CERES
3 CREAM (GRAM) TOPICAL AT BEDTIME
Refills: 0 | Status: DISCONTINUED | OUTPATIENT
Start: 2023-12-17 | End: 2023-12-19

## 2023-12-17 RX ORDER — SIMVASTATIN 20 MG/1
10 TABLET, FILM COATED ORAL AT BEDTIME
Refills: 0 | Status: DISCONTINUED | OUTPATIENT
Start: 2023-12-17 | End: 2023-12-19

## 2023-12-17 RX ORDER — ONDANSETRON 8 MG/1
4 TABLET, FILM COATED ORAL EVERY 8 HOURS
Refills: 0 | Status: DISCONTINUED | OUTPATIENT
Start: 2023-12-17 | End: 2023-12-19

## 2023-12-17 RX ORDER — MAGNESIUM SULFATE 500 MG/ML
2 VIAL (ML) INJECTION ONCE
Refills: 0 | Status: COMPLETED | OUTPATIENT
Start: 2023-12-17 | End: 2023-12-17

## 2023-12-17 RX ORDER — ACETAMINOPHEN 500 MG
650 TABLET ORAL EVERY 6 HOURS
Refills: 0 | Status: DISCONTINUED | OUTPATIENT
Start: 2023-12-17 | End: 2023-12-19

## 2023-12-17 RX ORDER — PREGABALIN 225 MG/1
1000 CAPSULE ORAL DAILY
Refills: 0 | Status: COMPLETED | OUTPATIENT
Start: 2023-12-17 | End: 2023-12-18

## 2023-12-17 RX ADMIN — Medication 100 MICROGRAM(S): at 09:06

## 2023-12-17 RX ADMIN — INSULIN GLARGINE 5 UNIT(S): 100 INJECTION, SOLUTION SUBCUTANEOUS at 21:45

## 2023-12-17 RX ADMIN — SODIUM CHLORIDE 100 MILLILITER(S): 9 INJECTION INTRAMUSCULAR; INTRAVENOUS; SUBCUTANEOUS at 04:16

## 2023-12-17 RX ADMIN — Medication 25 GRAM(S): at 17:16

## 2023-12-17 RX ADMIN — SODIUM CHLORIDE 100 MILLILITER(S): 9 INJECTION INTRAMUSCULAR; INTRAVENOUS; SUBCUTANEOUS at 17:15

## 2023-12-17 RX ADMIN — Medication 2: at 17:32

## 2023-12-17 RX ADMIN — Medication 50 MILLIGRAM(S): at 21:44

## 2023-12-17 RX ADMIN — PREGABALIN 1000 MICROGRAM(S): 225 CAPSULE ORAL at 17:16

## 2023-12-17 RX ADMIN — SIMVASTATIN 10 MILLIGRAM(S): 20 TABLET, FILM COATED ORAL at 21:44

## 2023-12-17 RX ADMIN — ENOXAPARIN SODIUM 40 MILLIGRAM(S): 100 INJECTION SUBCUTANEOUS at 17:16

## 2023-12-17 RX ADMIN — AMLODIPINE BESYLATE 10 MILLIGRAM(S): 2.5 TABLET ORAL at 05:36

## 2023-12-17 NOTE — PROGRESS NOTE ADULT - ASSESSMENT
ASSESSMENT:  This is a 73 year old [place]araguan speaking female - daughter wishes to translate w/ PMHx of hypothyroidism, nephrolithiasis /P lithotripsy), HTN, HLD and DM2 - noncompliant with meds X 3 months presents for evaluation of sudden onset palpitations, lasting about 1 hour.        1.Uncontolled DM type 2   pt is on janumet   stopped insulin few months ago due to issues at injection site skin   cont lantus will decrease dose since her BG is low normal this am   as per discussion with endocrinologist   -will f/u orthostats    2- palpitation   tele monitor   repeat ECG   if any events on tele will get TTE     3.Hypothyroidism  -will continue levothyroxine 100mg biw and 50mg 5 x week  TSH in normal range     4-HTN   continue norvasc 10mg QD    5-HLD   LDL goal < 100   start low dose statin     6- Low soto b12   start supplement vit b12 x2 doses im then po on discharge     d/w daughter and pt at the bedside    ASSESSMENT:  This is a 73 year old Greenlandic speaking female - daughter wishes to translate w/ PMHx of hypothyroidism, nephrolithiasis /P lithotripsy), HTN, HLD and DM2 - noncompliant with meds X 3 months presents for evaluation of sudden onset palpitations, lasting about 1 hour.        1.Uncontolled DM type 2   pt is on janumet   stopped insulin few months ago due to issues at injection site skin   cont lantus will decrease dose since her BG is low normal this am   as per discussion with endocrinologist   -will f/u orthostats    2- palpitation   tele monitor   repeat ECG   if any events on tele will get TTE     3.Hypothyroidism  -will continue levothyroxine 100mg biw and 50mg 5 x week  TSH in normal range     4-HTN   continue norvasc 10mg QD    5-HLD   LDL goal < 100   start low dose statin     6- Low soto b12   start supplement vit b12 x2 doses im then po on discharge     d/w daughter and pt at the bedside

## 2023-12-17 NOTE — CONSULT NOTE ADULT - ASSESSMENT
73 y.o. Montserratian speaking Female with PMHx of T2D, HTN, HLD, Hypothyroidism presents for evaluation of sudden onset palpitations, associated with hand and feet parasthesisas. Endocrinology consult was requested for DM management Patient is accompanied by her daughter who would like to help with translation. Patient does not have PCP yet and taking medications from her country [place]. Her current regimen is Janumet  mg BID. She use to be on Levemir but self discontinued 3 m. ago due to skin indurations at the injection sites. Patient daughter states that she has not been compliant with diet since her  passed away recently. Reportedly she lost 10Lb non intentionally. A1C 8.3%. POC glucose upon presentation in upper 200. Received 10L last night resulting in low BG 60 in AM.     # Uncontrolled T2D  Secondary to non compliance and dietary indiscretions.  Although not insulin naive, appears sensitive  Decrease Lantus to 5U qhs  Continue correction scale  Monitor POC glucose qac and qhs with goal 120 - 180 mg/dL  If requires minimal or no insulin, upon discharge might consider increasing of Janumet to  mg BID  Needs diabetic education    # HLD  Continue statin  Low fat/cholesterol diet    # Hypothyroidism  Clinically and biochemically euthyroid  Continue current dose of Levothyroxine  73 y.o. Algerian speaking Female with PMHx of T2D, HTN, HLD, Hypothyroidism presents for evaluation of sudden onset palpitations, associated with hand and feet parasthesisas. Endocrinology consult was requested for DM management Patient is accompanied by her daughter who would like to help with translation. Patient does not have PCP yet and taking medications from her country [place]. Her current regimen is Janumet  mg BID. She use to be on Levemir but self discontinued 3 m. ago due to skin indurations at the injection sites. Patient daughter states that she has not been compliant with diet since her  passed away recently. Reportedly she lost 10Lb non intentionally. A1C 8.3%. POC glucose upon presentation in upper 200. Received 10L last night resulting in low BG 60 in AM.     # Uncontrolled T2D  Secondary to non compliance and dietary indiscretions.  Although not insulin naive, appears sensitive  Decrease Lantus to 5U qhs  Continue correction scale  Monitor POC glucose qac and qhs with goal 120 - 180 mg/dL  If requires minimal or no insulin, upon discharge might consider increasing of Janumet to  mg BID  Needs diabetic education    # HLD  Continue statin  Low fat/cholesterol diet    # Hypothyroidism  Clinically and biochemically euthyroid  Continue current dose of Levothyroxine

## 2023-12-17 NOTE — CONSULT NOTE ADULT - SUBJECTIVE AND OBJECTIVE BOX
HPI:  73 y.o. Lao speaking Female with PMHx of T2D, HTN, HLD, Hypothyroidism presents for evaluation of sudden onset palpitations, associated with hand and feet parasthesisas. Endocrinology consult was requested for DM management Patient is accompanied by her daughter who would like to help with translation. Patient does not have PCP yet and taking medications from her country [place]. Her current regimen is Janumet  mg BID. She use to be on Levemir but self discontinued 3 m. ago due to skin indurations at the injection sites. Patient daughter states that she has not been compliant with diet since her  passed away recently. Reportedly she lost 10Lb non intentionally. A1C 8.3%    REVIEW OF SYSTEMS:    CONSTITUTIONAL: No fever, weight loss, or fatigue  EYES: No eye pain, visual disturbances, or discharge  ENMT:  No difficulty hearing, tinnitus, vertigo; No sinus or throat pain  NECK: No pain or stiffness  RESPIRATORY: No cough, wheezing, chills or hemoptysis; No shortness of breath  CARDIOVASCULAR: No chest pain, palpitations, dizziness, or leg swelling  GASTROINTESTINAL: No abdominal or epigastric pain. No nausea, vomiting, or hematemesis; No diarrhea or constipation. No melena or hematochezia.  NEUROLOGICAL: No headaches, memory loss, loss of strength, numbness, or tremors  SKIN: No itching, burning, rashes, or lesions   MUSCULOSKELETAL: No joint pain or swelling; No muscle, back, or extremity pain  PSYCHIATRIC: No depression, anxiety, mood swings, or difficulty sleeping    No Known Allergies    MEDICATIONS  (STANDING):  amLODIPine   Tablet 10 milliGRAM(s) Oral daily  cyanocobalamin Injectable 1000 MICROGram(s) IntraMuscular daily  dextrose 5%. 1000 milliLiter(s) (100 mL/Hr) IV Continuous <Continuous>  dextrose 5%. 1000 milliLiter(s) (50 mL/Hr) IV Continuous <Continuous>  dextrose 50% Injectable 25 Gram(s) IV Push once  dextrose 50% Injectable 25 Gram(s) IV Push once  dextrose 50% Injectable 12.5 Gram(s) IV Push once  enoxaparin Injectable 40 milliGRAM(s) SubCutaneous every 24 hours  famotidine    Tablet 20 milliGRAM(s) Oral daily  glucagon  Injectable 1 milliGRAM(s) IntraMuscular once  insulin glargine Injectable (LANTUS) 5 Unit(s) SubCutaneous at bedtime  insulin lispro (ADMELOG) corrective regimen sliding scale   SubCutaneous three times a day before meals  levothyroxine 50 MICROGram(s) Oral <User Schedule>  levothyroxine 100 MICROGram(s) Oral <User Schedule>  magnesium sulfate  IVPB 2 Gram(s) IV Intermittent once  simvastatin 10 milliGRAM(s) Oral at bedtime  sodium chloride 0.9%. 1000 milliLiter(s) (100 mL/Hr) IV Continuous <Continuous>  traZODone 50 milliGRAM(s) Oral at bedtime    MEDICATIONS  (PRN):  acetaminophen     Tablet .. 650 milliGRAM(s) Oral every 6 hours PRN Temp greater or equal to 38C (100.4F), Mild Pain (1 - 3)  aluminum hydroxide/magnesium hydroxide/simethicone Suspension 30 milliLiter(s) Oral every 4 hours PRN Dyspepsia  dextrose Oral Gel 15 Gram(s) Oral once PRN Blood Glucose LESS THAN 70 milliGRAM(s)/deciliter  melatonin 3 milliGRAM(s) Oral at bedtime PRN Insomnia  ondansetron Injectable 4 milliGRAM(s) IV Push every 8 hours PRN Nausea and/or Vomiting    Vital Signs Last 24 Hrs  T(C): 36.7 (17 Dec 2023 07:31), Max: 36.9 (17 Dec 2023 03:49)  T(F): 98 (17 Dec 2023 07:31), Max: 98.4 (17 Dec 2023 03:49)  HR: 69 (17 Dec 2023 07:31) (69 - 82)  BP: 134/67 (17 Dec 2023 07:31) (134/67 - 152/72)  BP(mean): --  RR: 18 (17 Dec 2023 07:31) (18 - 18)  SpO2: 97% (17 Dec 2023 07:31) (96% - 97%)    Parameters below as of 17 Dec 2023 07:31  Patient On (Oxygen Delivery Method): room air    Physical Exam:    Constitutional: NAD  HEENT: EOMI, no exophalmos  Neck: trachea midline,  Respiratory: CTAB, normal respirations  Cardiovascular: S1 and S2, RRR  Gastrointestinal: BS+, soft, ntnd  Extremities: No peripheral edema  Neurological: AOx3, no focal deficits  Psychiatric: Normal mood and normal affect  Skin: warm and dry    LABS  12-17    136  |  101  |  10.6  ----------------------------<  60<L>  3.8   |  23.0  |  0.50    Ca    8.1<L>      17 Dec 2023 05:45  Mg     1.5     12-16    TPro  6.6  /  Alb  4.1  /  TBili  0.4  /  DBili  x   /  AST  13  /  ALT  9   /  AlkPhos  66  12-16                          11.7   10.12 )-----------( 354      ( 17 Dec 2023 05:45 )             33.8     Cholesterol: 164 mg/dL (12-17-23 @ 07:50)  Triglycerides, Serum: 39 mg/dL (12-17-23 @ 07:50)  HDL Cholesterol: 53 mg/dL (12-17-23 @ 07:50)    A1C with Estimated Average Glucose Result: 8.3 % (12-17-23 @ 07:50)  Thyroid Stimulating Hormone, Serum: 3.11 uIU/mL (12-16-23 @ 16:00)  T4, Serum: 7.4 ug/dL (12-16-23 @ 16:00)    Alkaline Phosphatase: 66 U/L (12-16-23 @ 16:00)  Albumin: 4.1 g/dL (12-16-23 @ 16:00)  Aspartate Aminotransferase (AST/SGOT): 13 U/L (12-16-23 @ 16:00)  Alanine Aminotransferase (ALT/SGPT): 9 U/L (12-16-23 @ 16:00)    CAPILLARY BLOOD GLUCOSE    POCT Blood Glucose.: 218 mg/dL (17 Dec 2023 11:21)  POCT Blood Glucose.: 77 mg/dL (17 Dec 2023 08:37)  POCT Blood Glucose.: 203 mg/dL (16 Dec 2023 20:50)   HPI:  73 y.o. Ukrainian speaking Female with PMHx of T2D, HTN, HLD, Hypothyroidism presents for evaluation of sudden onset palpitations, associated with hand and feet parasthesisas. Endocrinology consult was requested for DM management Patient is accompanied by her daughter who would like to help with translation. Patient does not have PCP yet and taking medications from her country [place]. Her current regimen is Janumet  mg BID. She use to be on Levemir but self discontinued 3 m. ago due to skin indurations at the injection sites. Patient daughter states that she has not been compliant with diet since her  passed away recently. Reportedly she lost 10Lb non intentionally. A1C 8.3%    REVIEW OF SYSTEMS:    CONSTITUTIONAL: No fever, weight loss, or fatigue  EYES: No eye pain, visual disturbances, or discharge  ENMT:  No difficulty hearing, tinnitus, vertigo; No sinus or throat pain  NECK: No pain or stiffness  RESPIRATORY: No cough, wheezing, chills or hemoptysis; No shortness of breath  CARDIOVASCULAR: No chest pain, palpitations, dizziness, or leg swelling  GASTROINTESTINAL: No abdominal or epigastric pain. No nausea, vomiting, or hematemesis; No diarrhea or constipation. No melena or hematochezia.  NEUROLOGICAL: No headaches, memory loss, loss of strength, numbness, or tremors  SKIN: No itching, burning, rashes, or lesions   MUSCULOSKELETAL: No joint pain or swelling; No muscle, back, or extremity pain  PSYCHIATRIC: No depression, anxiety, mood swings, or difficulty sleeping    No Known Allergies    MEDICATIONS  (STANDING):  amLODIPine   Tablet 10 milliGRAM(s) Oral daily  cyanocobalamin Injectable 1000 MICROGram(s) IntraMuscular daily  dextrose 5%. 1000 milliLiter(s) (100 mL/Hr) IV Continuous <Continuous>  dextrose 5%. 1000 milliLiter(s) (50 mL/Hr) IV Continuous <Continuous>  dextrose 50% Injectable 25 Gram(s) IV Push once  dextrose 50% Injectable 25 Gram(s) IV Push once  dextrose 50% Injectable 12.5 Gram(s) IV Push once  enoxaparin Injectable 40 milliGRAM(s) SubCutaneous every 24 hours  famotidine    Tablet 20 milliGRAM(s) Oral daily  glucagon  Injectable 1 milliGRAM(s) IntraMuscular once  insulin glargine Injectable (LANTUS) 5 Unit(s) SubCutaneous at bedtime  insulin lispro (ADMELOG) corrective regimen sliding scale   SubCutaneous three times a day before meals  levothyroxine 50 MICROGram(s) Oral <User Schedule>  levothyroxine 100 MICROGram(s) Oral <User Schedule>  magnesium sulfate  IVPB 2 Gram(s) IV Intermittent once  simvastatin 10 milliGRAM(s) Oral at bedtime  sodium chloride 0.9%. 1000 milliLiter(s) (100 mL/Hr) IV Continuous <Continuous>  traZODone 50 milliGRAM(s) Oral at bedtime    MEDICATIONS  (PRN):  acetaminophen     Tablet .. 650 milliGRAM(s) Oral every 6 hours PRN Temp greater or equal to 38C (100.4F), Mild Pain (1 - 3)  aluminum hydroxide/magnesium hydroxide/simethicone Suspension 30 milliLiter(s) Oral every 4 hours PRN Dyspepsia  dextrose Oral Gel 15 Gram(s) Oral once PRN Blood Glucose LESS THAN 70 milliGRAM(s)/deciliter  melatonin 3 milliGRAM(s) Oral at bedtime PRN Insomnia  ondansetron Injectable 4 milliGRAM(s) IV Push every 8 hours PRN Nausea and/or Vomiting    Vital Signs Last 24 Hrs  T(C): 36.7 (17 Dec 2023 07:31), Max: 36.9 (17 Dec 2023 03:49)  T(F): 98 (17 Dec 2023 07:31), Max: 98.4 (17 Dec 2023 03:49)  HR: 69 (17 Dec 2023 07:31) (69 - 82)  BP: 134/67 (17 Dec 2023 07:31) (134/67 - 152/72)  BP(mean): --  RR: 18 (17 Dec 2023 07:31) (18 - 18)  SpO2: 97% (17 Dec 2023 07:31) (96% - 97%)    Parameters below as of 17 Dec 2023 07:31  Patient On (Oxygen Delivery Method): room air    Physical Exam:    Constitutional: NAD  HEENT: EOMI, no exophalmos  Neck: trachea midline,  Respiratory: CTAB, normal respirations  Cardiovascular: S1 and S2, RRR  Gastrointestinal: BS+, soft, ntnd  Extremities: No peripheral edema  Neurological: AOx3, no focal deficits  Psychiatric: Normal mood and normal affect  Skin: warm and dry    LABS  12-17    136  |  101  |  10.6  ----------------------------<  60<L>  3.8   |  23.0  |  0.50    Ca    8.1<L>      17 Dec 2023 05:45  Mg     1.5     12-16    TPro  6.6  /  Alb  4.1  /  TBili  0.4  /  DBili  x   /  AST  13  /  ALT  9   /  AlkPhos  66  12-16                          11.7   10.12 )-----------( 354      ( 17 Dec 2023 05:45 )             33.8     Cholesterol: 164 mg/dL (12-17-23 @ 07:50)  Triglycerides, Serum: 39 mg/dL (12-17-23 @ 07:50)  HDL Cholesterol: 53 mg/dL (12-17-23 @ 07:50)    A1C with Estimated Average Glucose Result: 8.3 % (12-17-23 @ 07:50)  Thyroid Stimulating Hormone, Serum: 3.11 uIU/mL (12-16-23 @ 16:00)  T4, Serum: 7.4 ug/dL (12-16-23 @ 16:00)    Alkaline Phosphatase: 66 U/L (12-16-23 @ 16:00)  Albumin: 4.1 g/dL (12-16-23 @ 16:00)  Aspartate Aminotransferase (AST/SGOT): 13 U/L (12-16-23 @ 16:00)  Alanine Aminotransferase (ALT/SGPT): 9 U/L (12-16-23 @ 16:00)    CAPILLARY BLOOD GLUCOSE    POCT Blood Glucose.: 218 mg/dL (17 Dec 2023 11:21)  POCT Blood Glucose.: 77 mg/dL (17 Dec 2023 08:37)  POCT Blood Glucose.: 203 mg/dL (16 Dec 2023 20:50)

## 2023-12-17 NOTE — PROGRESS NOTE ADULT - SUBJECTIVE AND OBJECTIVE BOX
Patient is a 73y old  Female who presents with a chief complaint of palpitations (16 Dec 2023 21:33)      Patient seen and examined at bedside in am ( speaks Persian myself )   shE is c/o urinary symptoms , incontinence , some neck and lower back pain   she is tearful as she talks about her  she lost recently     endocrinology consult requested in am   BG overnight reviewed     ALLERGIES:  No Known Allergies    MEDICATIONS  (STANDING):  amLODIPine   Tablet 10 milliGRAM(s) Oral daily  cyanocobalamin Injectable 1000 MICROGram(s) IntraMuscular daily  dextrose 5%. 1000 milliLiter(s) (50 mL/Hr) IV Continuous <Continuous>  dextrose 5%. 1000 milliLiter(s) (100 mL/Hr) IV Continuous <Continuous>  dextrose 50% Injectable 12.5 Gram(s) IV Push once  dextrose 50% Injectable 25 Gram(s) IV Push once  dextrose 50% Injectable 25 Gram(s) IV Push once  enoxaparin Injectable 40 milliGRAM(s) SubCutaneous every 24 hours  glucagon  Injectable 1 milliGRAM(s) IntraMuscular once  insulin glargine Injectable (LANTUS) 5 Unit(s) SubCutaneous at bedtime  insulin lispro (ADMELOG) corrective regimen sliding scale   SubCutaneous three times a day before meals  levothyroxine 100 MICROGram(s) Oral <User Schedule>  levothyroxine 50 MICROGram(s) Oral <User Schedule>  magnesium sulfate  IVPB 2 Gram(s) IV Intermittent once  sodium chloride 0.9%. 1000 milliLiter(s) (100 mL/Hr) IV Continuous <Continuous>  traZODone 50 milliGRAM(s) Oral at bedtime    MEDICATIONS  (PRN):  acetaminophen     Tablet .. 650 milliGRAM(s) Oral every 6 hours PRN Temp greater or equal to 38C (100.4F), Mild Pain (1 - 3)  aluminum hydroxide/magnesium hydroxide/simethicone Suspension 30 milliLiter(s) Oral every 4 hours PRN Dyspepsia  dextrose Oral Gel 15 Gram(s) Oral once PRN Blood Glucose LESS THAN 70 milliGRAM(s)/deciliter  melatonin 3 milliGRAM(s) Oral at bedtime PRN Insomnia  ondansetron Injectable 4 milliGRAM(s) IV Push every 8 hours PRN Nausea and/or Vomiting    Vital Signs Last 24 Hrs  T(F): 98 (17 Dec 2023 07:31), Max: 98.4 (17 Dec 2023 03:49)  HR: 69 (17 Dec 2023 07:31) (69 - 82)  BP: 134/67 (17 Dec 2023 07:31) (134/67 - 152/72)  RR: 18 (17 Dec 2023 07:31) (18 - 18)  SpO2: 97% (17 Dec 2023 07:31) (96% - 97%)  I&O's Summary      PHYSICAL EXAM:  General: awake   Neck: supple , some discomfort with movements   Lungs: CTA bilateral   Cardio: RRR, S1/S2, No murmur  Abdomen: Soft, Nontender, Nondistended; Bowel sounds present  Extremities: No calf tenderness, No pitting edema  Back :     LABS:                        11.7   10.12 )-----------( 354      ( 17 Dec 2023 05:45 )             33.8     12-17    136  |  101  |  10.6  ----------------------------<  60  3.8   |  23.0  |  0.50    Ca    8.1      17 Dec 2023 05:45  Mg     1.5     12-16    TPro  6.6  /  Alb  4.1  /  TBili  0.4  /  DBili  x   /  AST  13  /  ALT  9   /  AlkPhos  66  12-16          PT/INR - ( 17 Dec 2023 05:45 )   PT: 12.0 sec;   INR: 1.08 ratio         PTT - ( 17 Dec 2023 05:45 )  PTT:30.0 sec          12-17 Chol 164 mg/dL LDL -- HDL 53 mg/dL Trig 39 mg/dL  TSH 3.11   TSH with FT4 reflex --  Total T3 --    16:00 - VBG - pH: 7.390 | pCO2: 41    | pO2: 67    | Lactate:                  POCT Blood Glucose.: 218 mg/dL (17 Dec 2023 11:21)  POCT Blood Glucose.: 77 mg/dL (17 Dec 2023 08:37)  POCT Blood Glucose.: 203 mg/dL (16 Dec 2023 20:50)      Urinalysis Basic - ( 17 Dec 2023 05:45 )    Color: x / Appearance: x / SG: x / pH: x  Gluc: 60 mg/dL / Ketone: x  / Bili: x / Urobili: x   Blood: x / Protein: x / Nitrite: x   Leuk Esterase: x / RBC: x / WBC x   Sq Epi: x / Non Sq Epi: x / Bacteria: x          RADIOLOGY & ADDITIONAL TESTS:       Patient is a 73y old  Female who presents with a chief complaint of palpitations (16 Dec 2023 21:33)      Patient seen and examined at bedside in am ( speaks Armenian myself )   shE is c/o urinary symptoms , incontinence , some neck and lower back pain   she is tearful as she talks about her  she lost recently     endocrinology consult requested in am   BG overnight reviewed     ALLERGIES:  No Known Allergies    MEDICATIONS  (STANDING):  amLODIPine   Tablet 10 milliGRAM(s) Oral daily  cyanocobalamin Injectable 1000 MICROGram(s) IntraMuscular daily  dextrose 5%. 1000 milliLiter(s) (50 mL/Hr) IV Continuous <Continuous>  dextrose 5%. 1000 milliLiter(s) (100 mL/Hr) IV Continuous <Continuous>  dextrose 50% Injectable 12.5 Gram(s) IV Push once  dextrose 50% Injectable 25 Gram(s) IV Push once  dextrose 50% Injectable 25 Gram(s) IV Push once  enoxaparin Injectable 40 milliGRAM(s) SubCutaneous every 24 hours  glucagon  Injectable 1 milliGRAM(s) IntraMuscular once  insulin glargine Injectable (LANTUS) 5 Unit(s) SubCutaneous at bedtime  insulin lispro (ADMELOG) corrective regimen sliding scale   SubCutaneous three times a day before meals  levothyroxine 100 MICROGram(s) Oral <User Schedule>  levothyroxine 50 MICROGram(s) Oral <User Schedule>  magnesium sulfate  IVPB 2 Gram(s) IV Intermittent once  sodium chloride 0.9%. 1000 milliLiter(s) (100 mL/Hr) IV Continuous <Continuous>  traZODone 50 milliGRAM(s) Oral at bedtime    MEDICATIONS  (PRN):  acetaminophen     Tablet .. 650 milliGRAM(s) Oral every 6 hours PRN Temp greater or equal to 38C (100.4F), Mild Pain (1 - 3)  aluminum hydroxide/magnesium hydroxide/simethicone Suspension 30 milliLiter(s) Oral every 4 hours PRN Dyspepsia  dextrose Oral Gel 15 Gram(s) Oral once PRN Blood Glucose LESS THAN 70 milliGRAM(s)/deciliter  melatonin 3 milliGRAM(s) Oral at bedtime PRN Insomnia  ondansetron Injectable 4 milliGRAM(s) IV Push every 8 hours PRN Nausea and/or Vomiting    Vital Signs Last 24 Hrs  T(F): 98 (17 Dec 2023 07:31), Max: 98.4 (17 Dec 2023 03:49)  HR: 69 (17 Dec 2023 07:31) (69 - 82)  BP: 134/67 (17 Dec 2023 07:31) (134/67 - 152/72)  RR: 18 (17 Dec 2023 07:31) (18 - 18)  SpO2: 97% (17 Dec 2023 07:31) (96% - 97%)  I&O's Summary      PHYSICAL EXAM:  General: awake   Neck: supple , some discomfort with movements   Lungs: CTA bilateral   Cardio: RRR, S1/S2, No murmur  Abdomen: Soft, Nontender, Nondistended; Bowel sounds present  Extremities: No calf tenderness, No pitting edema  Back :     LABS:                        11.7   10.12 )-----------( 354      ( 17 Dec 2023 05:45 )             33.8     12-17    136  |  101  |  10.6  ----------------------------<  60  3.8   |  23.0  |  0.50    Ca    8.1      17 Dec 2023 05:45  Mg     1.5     12-16    TPro  6.6  /  Alb  4.1  /  TBili  0.4  /  DBili  x   /  AST  13  /  ALT  9   /  AlkPhos  66  12-16          PT/INR - ( 17 Dec 2023 05:45 )   PT: 12.0 sec;   INR: 1.08 ratio         PTT - ( 17 Dec 2023 05:45 )  PTT:30.0 sec          12-17 Chol 164 mg/dL LDL -- HDL 53 mg/dL Trig 39 mg/dL  TSH 3.11   TSH with FT4 reflex --  Total T3 --    16:00 - VBG - pH: 7.390 | pCO2: 41    | pO2: 67    | Lactate:                  POCT Blood Glucose.: 218 mg/dL (17 Dec 2023 11:21)  POCT Blood Glucose.: 77 mg/dL (17 Dec 2023 08:37)  POCT Blood Glucose.: 203 mg/dL (16 Dec 2023 20:50)      Urinalysis Basic - ( 17 Dec 2023 05:45 )    Color: x / Appearance: x / SG: x / pH: x  Gluc: 60 mg/dL / Ketone: x  / Bili: x / Urobili: x   Blood: x / Protein: x / Nitrite: x   Leuk Esterase: x / RBC: x / WBC x   Sq Epi: x / Non Sq Epi: x / Bacteria: x          RADIOLOGY & ADDITIONAL TESTS:

## 2023-12-18 LAB
24R-OH-CALCIDIOL SERPL-MCNC: 33.9 NG/ML — SIGNIFICANT CHANGE UP (ref 30–80)
24R-OH-CALCIDIOL SERPL-MCNC: 33.9 NG/ML — SIGNIFICANT CHANGE UP (ref 30–80)
GLUCOSE BLDC GLUCOMTR-MCNC: 242 MG/DL — HIGH (ref 70–99)
GLUCOSE BLDC GLUCOMTR-MCNC: 242 MG/DL — HIGH (ref 70–99)
GLUCOSE BLDC GLUCOMTR-MCNC: 257 MG/DL — HIGH (ref 70–99)
GLUCOSE BLDC GLUCOMTR-MCNC: 257 MG/DL — HIGH (ref 70–99)
GLUCOSE BLDC GLUCOMTR-MCNC: 390 MG/DL — HIGH (ref 70–99)
GLUCOSE BLDC GLUCOMTR-MCNC: 390 MG/DL — HIGH (ref 70–99)
GLUCOSE BLDC GLUCOMTR-MCNC: 46 MG/DL — CRITICAL LOW (ref 70–99)
GLUCOSE BLDC GLUCOMTR-MCNC: 46 MG/DL — CRITICAL LOW (ref 70–99)
GLUCOSE BLDC GLUCOMTR-MCNC: 87 MG/DL — SIGNIFICANT CHANGE UP (ref 70–99)
GLUCOSE BLDC GLUCOMTR-MCNC: 87 MG/DL — SIGNIFICANT CHANGE UP (ref 70–99)
MAGNESIUM SERPL-MCNC: 2.2 MG/DL — SIGNIFICANT CHANGE UP (ref 1.6–2.6)
MAGNESIUM SERPL-MCNC: 2.2 MG/DL — SIGNIFICANT CHANGE UP (ref 1.6–2.6)
NT-PROBNP SERPL-SCNC: 346 PG/ML — HIGH (ref 0–300)
NT-PROBNP SERPL-SCNC: 346 PG/ML — HIGH (ref 0–300)
PHOSPHATE SERPL-MCNC: 3.2 MG/DL — SIGNIFICANT CHANGE UP (ref 2.4–4.7)
PHOSPHATE SERPL-MCNC: 3.2 MG/DL — SIGNIFICANT CHANGE UP (ref 2.4–4.7)

## 2023-12-18 PROCEDURE — 99232 SBSQ HOSP IP/OBS MODERATE 35: CPT

## 2023-12-18 PROCEDURE — 99233 SBSQ HOSP IP/OBS HIGH 50: CPT

## 2023-12-18 PROCEDURE — 70450 CT HEAD/BRAIN W/O DYE: CPT | Mod: 26

## 2023-12-18 RX ORDER — LOSARTAN POTASSIUM 100 MG/1
25 TABLET, FILM COATED ORAL DAILY
Refills: 0 | Status: DISCONTINUED | OUTPATIENT
Start: 2023-12-18 | End: 2023-12-19

## 2023-12-18 RX ORDER — INSULIN LISPRO 100/ML
VIAL (ML) SUBCUTANEOUS
Refills: 0 | Status: DISCONTINUED | OUTPATIENT
Start: 2023-12-18 | End: 2023-12-19

## 2023-12-18 RX ORDER — ASPIRIN/CALCIUM CARB/MAGNESIUM 324 MG
81 TABLET ORAL DAILY
Refills: 0 | Status: DISCONTINUED | OUTPATIENT
Start: 2023-12-18 | End: 2023-12-19

## 2023-12-18 RX ORDER — INFLUENZA VIRUS VACCINE 15; 15; 15; 15 UG/.5ML; UG/.5ML; UG/.5ML; UG/.5ML
0.7 SUSPENSION INTRAMUSCULAR ONCE
Refills: 0 | Status: DISCONTINUED | OUTPATIENT
Start: 2023-12-18 | End: 2023-12-19

## 2023-12-18 RX ORDER — DEXTROSE 50 % IN WATER 50 %
25 SYRINGE (ML) INTRAVENOUS ONCE
Refills: 0 | Status: COMPLETED | OUTPATIENT
Start: 2023-12-18 | End: 2023-12-18

## 2023-12-18 RX ADMIN — LOSARTAN POTASSIUM 25 MILLIGRAM(S): 100 TABLET, FILM COATED ORAL at 10:44

## 2023-12-18 RX ADMIN — PREGABALIN 1000 MICROGRAM(S): 225 CAPSULE ORAL at 13:03

## 2023-12-18 RX ADMIN — Medication 3 MILLIGRAM(S): at 23:19

## 2023-12-18 RX ADMIN — SODIUM CHLORIDE 100 MILLILITER(S): 9 INJECTION INTRAMUSCULAR; INTRAVENOUS; SUBCUTANEOUS at 07:01

## 2023-12-18 RX ADMIN — FAMOTIDINE 20 MILLIGRAM(S): 10 INJECTION INTRAVENOUS at 13:04

## 2023-12-18 RX ADMIN — Medication 650 MILLIGRAM(S): at 23:19

## 2023-12-18 RX ADMIN — INSULIN GLARGINE 5 UNIT(S): 100 INJECTION, SOLUTION SUBCUTANEOUS at 23:19

## 2023-12-18 RX ADMIN — ENOXAPARIN SODIUM 40 MILLIGRAM(S): 100 INJECTION SUBCUTANEOUS at 16:42

## 2023-12-18 RX ADMIN — AMLODIPINE BESYLATE 10 MILLIGRAM(S): 2.5 TABLET ORAL at 07:00

## 2023-12-18 RX ADMIN — Medication 650 MILLIGRAM(S): at 13:00

## 2023-12-18 RX ADMIN — Medication 10: at 12:59

## 2023-12-18 RX ADMIN — Medication 650 MILLIGRAM(S): at 16:16

## 2023-12-18 RX ADMIN — Medication 25 GRAM(S): at 16:42

## 2023-12-18 RX ADMIN — Medication 81 MILLIGRAM(S): at 13:04

## 2023-12-18 RX ADMIN — Medication 50 MILLIGRAM(S): at 23:19

## 2023-12-18 RX ADMIN — Medication 50 MICROGRAM(S): at 09:08

## 2023-12-18 RX ADMIN — SIMVASTATIN 10 MILLIGRAM(S): 20 TABLET, FILM COATED ORAL at 23:19

## 2023-12-18 NOTE — PROGRESS NOTE ADULT - ASSESSMENT
ASSESSMENT:  This is a 73 year old Portuguese speaking female - daughter wishes to translate w/ PMHx of hypothyroidism, nephrolithiasis /P lithotripsy), HTN, HLD and DM2 - noncompliant with meds X 3 months presents for evaluation of sudden onset palpitations, lasting about 1 hour.        1.Uncontolled DM type 2   pt is on janumet will increase dise on DC   endocrinology input appreciated     2- palpitation   tele monitor   TTE     3.Hypothyroidism  -will continue levothyroxine 100mg biw and 50mg 5 x week  TSH in normal range     4-HTN   continue norvasc 10mg QD  add losartan for better BP control     5-HLD   LDL goal < 100   start low dose statin     6- Low soto b12   start supplement vit b12 x2 doses im then po on discharge     d/w daughter and pt at the bedside    ASSESSMENT:  This is a 73 year old Peruvian speaking female - daughter wishes to translate w/ PMHx of hypothyroidism, nephrolithiasis /P lithotripsy), HTN, HLD and DM2 - noncompliant with meds X 3 months presents for evaluation of sudden onset palpitations, lasting about 1 hour.        1.Uncontolled DM type 2   pt is on janumet will increase dise on DC   endocrinology input appreciated     2- palpitation   tele monitor   TTE     3.Hypothyroidism  -will continue levothyroxine 100mg biw and 50mg 5 x week  TSH in normal range     4-HTN   continue norvasc 10mg QD  add losartan for better BP control     5-HLD   LDL goal < 100   start low dose statin     6- Low soto b12   start supplement vit b12 x2 doses im then po on discharge     d/w daughter and pt at the bedside

## 2023-12-18 NOTE — PATIENT PROFILE ADULT - FALL HARM RISK - HARM RISK INTERVENTIONS
Communicate Risk of Fall with Harm to all staff/Reinforce activity limits and safety measures with patient and family/Tailored Fall Risk Interventions/Visual Cue: Yellow wristband and red socks/Bed in lowest position, wheels locked, appropriate side rails in place/Call bell, personal items and telephone in reach/Instruct patient to call for assistance before getting out of bed or chair/Non-slip footwear when patient is out of bed/[place] to call system/Physically safe environment - no spills, clutter or unnecessary equipment/Purposeful Proactive Rounding/Room/bathroom lighting operational, light cord in reach Communicate Risk of Fall with Harm to all staff/Reinforce activity limits and safety measures with patient and family/Tailored Fall Risk Interventions/Visual Cue: Yellow wristband and red socks/Bed in lowest position, wheels locked, appropriate side rails in place/Call bell, personal items and telephone in reach/Instruct patient to call for assistance before getting out of bed or chair/Non-slip footwear when patient is out of bed/[place] to call system/Physically safe environment - no spills, clutter or unnecessary equipment/Purposeful Proactive Rounding/Room/bathroom lighting operational, light cord in reach

## 2023-12-18 NOTE — PROGRESS NOTE ADULT - SUBJECTIVE AND OBJECTIVE BOX
Patient is a 73y old  Female who presents with a chief complaint of palpitations (17 Dec 2023 14:18)      Patient seen and examined at bedside in am , She is with c/o dizziness and on movement up walking at times   No Sob   BP is labile high    No overnight events reported.   chart reviwed   ALLERGIES:  No Known Allergies    MEDICATIONS  (STANDING):  amLODIPine   Tablet 10 milliGRAM(s) Oral daily  aspirin enteric coated 81 milliGRAM(s) Oral daily  cyanocobalamin Injectable 1000 MICROGram(s) IntraMuscular daily  dextrose 5%. 1000 milliLiter(s) (100 mL/Hr) IV Continuous <Continuous>  dextrose 5%. 1000 milliLiter(s) (50 mL/Hr) IV Continuous <Continuous>  dextrose 50% Injectable 25 Gram(s) IV Push once  dextrose 50% Injectable 25 Gram(s) IV Push once  dextrose 50% Injectable 12.5 Gram(s) IV Push once  enoxaparin Injectable 40 milliGRAM(s) SubCutaneous every 24 hours  famotidine    Tablet 20 milliGRAM(s) Oral daily  glucagon  Injectable 1 milliGRAM(s) IntraMuscular once  insulin glargine Injectable (LANTUS) 5 Unit(s) SubCutaneous at bedtime  insulin lispro (ADMELOG) corrective regimen sliding scale   SubCutaneous three times a day before meals  levothyroxine 50 MICROGram(s) Oral <User Schedule>  levothyroxine 100 MICROGram(s) Oral <User Schedule>  simvastatin 10 milliGRAM(s) Oral at bedtime  traZODone 50 milliGRAM(s) Oral at bedtime    MEDICATIONS  (PRN):  acetaminophen     Tablet .. 650 milliGRAM(s) Oral every 6 hours PRN Temp greater or equal to 38C (100.4F), Mild Pain (1 - 3)  aluminum hydroxide/magnesium hydroxide/simethicone Suspension 30 milliLiter(s) Oral every 4 hours PRN Dyspepsia  dextrose Oral Gel 15 Gram(s) Oral once PRN Blood Glucose LESS THAN 70 milliGRAM(s)/deciliter  melatonin 3 milliGRAM(s) Oral at bedtime PRN Insomnia  ondansetron Injectable 4 milliGRAM(s) IV Push every 8 hours PRN Nausea and/or Vomiting    Vital Signs Last 24 Hrs  T(F): 98.1 (18 Dec 2023 08:15), Max: 98.6 (17 Dec 2023 19:52)  HR: 77 (18 Dec 2023 09:51) (64 - 77)  BP: 160/69 (18 Dec 2023 09:51) (130/67 - 172/71)  RR: 18 (18 Dec 2023 08:15) (18 - 18)  SpO2: 96% (18 Dec 2023 09:51) (93% - 97%)  I&O's Summary      PHYSICAL EXAM:  General:   ENT:   Neck:   Lungs:   Cardio: RRR, S1/S2, No murmur  Abdomen: Soft, Nontender, Nondistended; Bowel sounds present  Extremities: No calf tenderness, No pitting edema    LABS:                        11.7   10.12 )-----------( 354      ( 17 Dec 2023 05:45 )             33.8     12-17    136  |  101  |  10.6  ----------------------------<  60  3.8   |  23.0  |  0.50    Ca    8.1      17 Dec 2023 05:45  Phos  3.2     12-18  Mg     2.2     12-18    TPro  6.6  /  Alb  4.1  /  TBili  0.4  /  DBili  x   /  AST  13  /  ALT  9   /  AlkPhos  66  12-16          PT/INR - ( 17 Dec 2023 05:45 )   PT: 12.0 sec;   INR: 1.08 ratio         PTT - ( 17 Dec 2023 05:45 )  PTT:30.0 sec          12-17 Chol 164 mg/dL LDL -- HDL 53 mg/dL Trig 39 mg/dL  TSH 3.11   TSH with FT4 reflex --  Total T3 --    16:00 - VBG - pH: 7.390 | pCO2: 41    | pO2: 67    | Lactate:                  POCT Blood Glucose.: 87 mg/dL (18 Dec 2023 07:49)  POCT Blood Glucose.: 163 mg/dL (17 Dec 2023 21:35)  POCT Blood Glucose.: 187 mg/dL (17 Dec 2023 17:11)  POCT Blood Glucose.: 218 mg/dL (17 Dec 2023 11:21)      Urinalysis Basic - ( 17 Dec 2023 05:45 )    Color: x / Appearance: x / SG: x / pH: x  Gluc: 60 mg/dL / Ketone: x  / Bili: x / Urobili: x   Blood: x / Protein: x / Nitrite: x   Leuk Esterase: x / RBC: x / WBC x   Sq Epi: x / Non Sq Epi: x / Bacteria: x        Culture - Urine (collected 16 Dec 2023 18:00)  Source: Clean Catch Clean Catch (Midstream)  Final Report (17 Dec 2023 22:25):    <10,000 CFU/mL Normal Urogenital Kathleen        RADIOLOGY & ADDITIONAL TESTS:       Patient is a 73y old  Female who presents with a chief complaint of palpitations (17 Dec 2023 14:18)      Patient seen and examined at bedside in am , She is with c/o dizziness and on movement up walking at times   No Sob   BP is labile high    No overnight events reported.   chart reviewed     ALLERGIES:  No Known Allergies    MEDICATIONS  (STANDING):  amLODIPine   Tablet 10 milliGRAM(s) Oral daily  aspirin enteric coated 81 milliGRAM(s) Oral daily  cyanocobalamin Injectable 1000 MICROGram(s) IntraMuscular daily  dextrose 5%. 1000 milliLiter(s) (100 mL/Hr) IV Continuous <Continuous>  dextrose 5%. 1000 milliLiter(s) (50 mL/Hr) IV Continuous <Continuous>  dextrose 50% Injectable 25 Gram(s) IV Push once  dextrose 50% Injectable 25 Gram(s) IV Push once  dextrose 50% Injectable 12.5 Gram(s) IV Push once  enoxaparin Injectable 40 milliGRAM(s) SubCutaneous every 24 hours  famotidine    Tablet 20 milliGRAM(s) Oral daily  glucagon  Injectable 1 milliGRAM(s) IntraMuscular once  insulin glargine Injectable (LANTUS) 5 Unit(s) SubCutaneous at bedtime  insulin lispro (ADMELOG) corrective regimen sliding scale   SubCutaneous three times a day before meals  levothyroxine 50 MICROGram(s) Oral <User Schedule>  levothyroxine 100 MICROGram(s) Oral <User Schedule>  simvastatin 10 milliGRAM(s) Oral at bedtime  traZODone 50 milliGRAM(s) Oral at bedtime    MEDICATIONS  (PRN):  acetaminophen     Tablet .. 650 milliGRAM(s) Oral every 6 hours PRN Temp greater or equal to 38C (100.4F), Mild Pain (1 - 3)  aluminum hydroxide/magnesium hydroxide/simethicone Suspension 30 milliLiter(s) Oral every 4 hours PRN Dyspepsia  dextrose Oral Gel 15 Gram(s) Oral once PRN Blood Glucose LESS THAN 70 milliGRAM(s)/deciliter  melatonin 3 milliGRAM(s) Oral at bedtime PRN Insomnia  ondansetron Injectable 4 milliGRAM(s) IV Push every 8 hours PRN Nausea and/or Vomiting    Vital Signs Last 24 Hrs  T(F): 98.1 (18 Dec 2023 08:15), Max: 98.6 (17 Dec 2023 19:52)  HR: 77 (18 Dec 2023 09:51) (64 - 77)  BP: 160/69 (18 Dec 2023 09:51) (130/67 - 172/71)  RR: 18 (18 Dec 2023 08:15) (18 - 18)  SpO2: 96% (18 Dec 2023 09:51) (93% - 97%)  I&O's Summary      PHYSICAL EXAM:  General: awake alert   Neck: supple . no JVD   Lungs: CTA bilateral   Cardio: RRR, S1/S2, No murmur  Abdomen: Soft, Nontender, Nondistended; Bowel sounds present  Extremities: No calf tenderness, No pitting edema    LABS:                        11.7   10.12 )-----------( 354      ( 17 Dec 2023 05:45 )             33.8     12-17    136  |  101  |  10.6  ----------------------------<  60  3.8   |  23.0  |  0.50    Ca    8.1      17 Dec 2023 05:45  Phos  3.2     12-18  Mg     2.2     12-18    TPro  6.6  /  Alb  4.1  /  TBili  0.4  /  DBili  x   /  AST  13  /  ALT  9   /  AlkPhos  66  12-16          PT/INR - ( 17 Dec 2023 05:45 )   PT: 12.0 sec;   INR: 1.08 ratio         PTT - ( 17 Dec 2023 05:45 )  PTT:30.0 sec          12-17 Chol 164 mg/dL LDL -- HDL 53 mg/dL Trig 39 mg/dL  TSH 3.11   TSH with FT4 reflex --  Total T3 --    16:00 - VBG - pH: 7.390 | pCO2: 41    | pO2: 67    | Lactate:                  POCT Blood Glucose.: 87 mg/dL (18 Dec 2023 07:49)  POCT Blood Glucose.: 163 mg/dL (17 Dec 2023 21:35)  POCT Blood Glucose.: 187 mg/dL (17 Dec 2023 17:11)  POCT Blood Glucose.: 218 mg/dL (17 Dec 2023 11:21)      Urinalysis Basic - ( 17 Dec 2023 05:45 )    Color: x / Appearance: x / SG: x / pH: x  Gluc: 60 mg/dL / Ketone: x  / Bili: x / Urobili: x   Blood: x / Protein: x / Nitrite: x   Leuk Esterase: x / RBC: x / WBC x   Sq Epi: x / Non Sq Epi: x / Bacteria: x        Culture - Urine (collected 16 Dec 2023 18:00)  Source: Clean Catch Clean Catch (Midstream)  Final Report (17 Dec 2023 22:25):    <10,000 CFU/mL Normal Urogenital Kathleen        RADIOLOGY & ADDITIONAL TESTS:

## 2023-12-18 NOTE — PROGRESS NOTE ADULT - ASSESSMENT
73F with PMHx of T2D, HTN, HLD, Hypothyroidism presents for evaluation of sudden onset palpitations, associated with hand and feet parasthesisas.     Consulted for diabetes management  Home regimen: Janumet  mg BID  Current a1c: 8.3%    1. T2DM  - Complains of arm pruritis after insulin injections, can try injecting in stomach  - Continue lantus 5 units daily  - Sliding scale coverage  - Can discharge on increased dose of Janumet  mg BID    2. Hypothyroidism  - Continue levothyroxine  - TSH in normal range     3. Palpitations  - Monitor on telemetry, care per primary team

## 2023-12-18 NOTE — PROGRESS NOTE ADULT - SUBJECTIVE AND OBJECTIVE BOX
INTERVAL EVENTS:  Follow up diabetes management    ROS: Denies chest pain, sob, abd pain.     MEDICATIONS  (STANDING):  amLODIPine   Tablet 10 milliGRAM(s) Oral daily  aspirin enteric coated 81 milliGRAM(s) Oral daily  cyanocobalamin Injectable 1000 MICROGram(s) IntraMuscular daily  dextrose 5%. 1000 milliLiter(s) (50 mL/Hr) IV Continuous <Continuous>  dextrose 5%. 1000 milliLiter(s) (100 mL/Hr) IV Continuous <Continuous>  dextrose 50% Injectable 12.5 Gram(s) IV Push once  dextrose 50% Injectable 25 Gram(s) IV Push once  dextrose 50% Injectable 25 Gram(s) IV Push once  enoxaparin Injectable 40 milliGRAM(s) SubCutaneous every 24 hours  famotidine    Tablet 20 milliGRAM(s) Oral daily  glucagon  Injectable 1 milliGRAM(s) IntraMuscular once  influenza  Vaccine (HIGH DOSE) 0.7 milliLiter(s) IntraMuscular once  insulin glargine Injectable (LANTUS) 5 Unit(s) SubCutaneous at bedtime  insulin lispro (ADMELOG) corrective regimen sliding scale   SubCutaneous three times a day before meals  levothyroxine 50 MICROGram(s) Oral <User Schedule>  levothyroxine 100 MICROGram(s) Oral <User Schedule>  losartan 25 milliGRAM(s) Oral daily  simvastatin 10 milliGRAM(s) Oral at bedtime  traZODone 50 milliGRAM(s) Oral at bedtime    MEDICATIONS  (PRN):  acetaminophen     Tablet .. 650 milliGRAM(s) Oral every 6 hours PRN Temp greater or equal to 38C (100.4F), Mild Pain (1 - 3)  aluminum hydroxide/magnesium hydroxide/simethicone Suspension 30 milliLiter(s) Oral every 4 hours PRN Dyspepsia  dextrose Oral Gel 15 Gram(s) Oral once PRN Blood Glucose LESS THAN 70 milliGRAM(s)/deciliter  melatonin 3 milliGRAM(s) Oral at bedtime PRN Insomnia  ondansetron Injectable 4 milliGRAM(s) IV Push every 8 hours PRN Nausea and/or Vomiting    Allergies  No Known Allergies    Vital Signs Last 24 Hrs  T(C): 36.5 (18 Dec 2023 11:45), Max: 37 (17 Dec 2023 19:52)  T(F): 97.7 (18 Dec 2023 11:45), Max: 98.6 (17 Dec 2023 19:52)  HR: 72 (18 Dec 2023 11:45) (64 - 77)  BP: 159/80 (18 Dec 2023 11:45) (130/67 - 172/71)  BP(mean): --  RR: 18 (18 Dec 2023 11:45) (18 - 18)  SpO2: 93% (18 Dec 2023 11:45) (93% - 97%)    Parameters below as of 18 Dec 2023 11:45  Patient On (Oxygen Delivery Method): room air    PHYSICAL EXAM:  General: No apparent distress  Neck: Supple, trachea midline, no thyromegaly  Respiratory: Lungs clear bilaterally, normal rate, effort  Cardiac: +S1, S2, no m/r/g  GI: +BS, soft, non tender, non distended  Extremities: No peripheral edema, no pedal lesions  Neuro: A+O X3, no tremor    LABS:                        11.7   10.12 )-----------( 354      ( 17 Dec 2023 05:45 )             33.8     12-17    136  |  101  |  10.6  ----------------------------<  60<L>  3.8   |  23.0  |  0.50    Ca    8.1<L>      17 Dec 2023 05:45  Phos  3.2     12-18  Mg     2.2     12-18    TPro  6.6  /  Alb  4.1  /  TBili  0.4  /  DBili  x   /  AST  13  /  ALT  9   /  AlkPhos  66  12-16    Urinalysis Basic - ( 17 Dec 2023 05:45 )    Color: x / Appearance: x / SG: x / pH: x  Gluc: 60 mg/dL / Ketone: x  / Bili: x / Urobili: x   Blood: x / Protein: x / Nitrite: x   Leuk Esterase: x / RBC: x / WBC x   Sq Epi: x / Non Sq Epi: x / Bacteria: x    POCT Blood Glucose.: 87 mg/dL (12-18-23 @ 07:49)  POCT Blood Glucose.: 163 mg/dL (12-17-23 @ 21:35)  POCT Blood Glucose.: 187 mg/dL (12-17-23 @ 17:11)    Thyroid Stimulating Hormone, Serum: 3.11 uIU/mL (12-16-23 @ 16:00)

## 2023-12-19 ENCOUNTER — TRANSCRIPTION ENCOUNTER (OUTPATIENT)
Age: 73
End: 2023-12-19

## 2023-12-19 VITALS
TEMPERATURE: 98 F | OXYGEN SATURATION: 96 % | DIASTOLIC BLOOD PRESSURE: 65 MMHG | HEART RATE: 72 BPM | RESPIRATION RATE: 18 BRPM | SYSTOLIC BLOOD PRESSURE: 145 MMHG

## 2023-12-19 LAB
ANION GAP SERPL CALC-SCNC: 10 MMOL/L — SIGNIFICANT CHANGE UP (ref 5–17)
ANION GAP SERPL CALC-SCNC: 10 MMOL/L — SIGNIFICANT CHANGE UP (ref 5–17)
BUN SERPL-MCNC: 15 MG/DL — SIGNIFICANT CHANGE UP (ref 8–20)
BUN SERPL-MCNC: 15 MG/DL — SIGNIFICANT CHANGE UP (ref 8–20)
CALCIUM SERPL-MCNC: 9.1 MG/DL — SIGNIFICANT CHANGE UP (ref 8.4–10.5)
CALCIUM SERPL-MCNC: 9.1 MG/DL — SIGNIFICANT CHANGE UP (ref 8.4–10.5)
CHLORIDE SERPL-SCNC: 98 MMOL/L — SIGNIFICANT CHANGE UP (ref 96–108)
CHLORIDE SERPL-SCNC: 98 MMOL/L — SIGNIFICANT CHANGE UP (ref 96–108)
CO2 SERPL-SCNC: 27 MMOL/L — SIGNIFICANT CHANGE UP (ref 22–29)
CO2 SERPL-SCNC: 27 MMOL/L — SIGNIFICANT CHANGE UP (ref 22–29)
CREAT SERPL-MCNC: 0.6 MG/DL — SIGNIFICANT CHANGE UP (ref 0.5–1.3)
CREAT SERPL-MCNC: 0.6 MG/DL — SIGNIFICANT CHANGE UP (ref 0.5–1.3)
EGFR: 94 ML/MIN/1.73M2 — SIGNIFICANT CHANGE UP
EGFR: 94 ML/MIN/1.73M2 — SIGNIFICANT CHANGE UP
GLUCOSE BLDC GLUCOMTR-MCNC: 150 MG/DL — HIGH (ref 70–99)
GLUCOSE BLDC GLUCOMTR-MCNC: 150 MG/DL — HIGH (ref 70–99)
GLUCOSE BLDC GLUCOMTR-MCNC: 249 MG/DL — HIGH (ref 70–99)
GLUCOSE BLDC GLUCOMTR-MCNC: 249 MG/DL — HIGH (ref 70–99)
GLUCOSE SERPL-MCNC: 158 MG/DL — HIGH (ref 70–99)
GLUCOSE SERPL-MCNC: 158 MG/DL — HIGH (ref 70–99)
HCT VFR BLD CALC: 33.6 % — LOW (ref 34.5–45)
HCT VFR BLD CALC: 33.6 % — LOW (ref 34.5–45)
HGB BLD-MCNC: 11.5 G/DL — SIGNIFICANT CHANGE UP (ref 11.5–15.5)
HGB BLD-MCNC: 11.5 G/DL — SIGNIFICANT CHANGE UP (ref 11.5–15.5)
MCHC RBC-ENTMCNC: 27.4 PG — SIGNIFICANT CHANGE UP (ref 27–34)
MCHC RBC-ENTMCNC: 27.4 PG — SIGNIFICANT CHANGE UP (ref 27–34)
MCHC RBC-ENTMCNC: 34.2 GM/DL — SIGNIFICANT CHANGE UP (ref 32–36)
MCHC RBC-ENTMCNC: 34.2 GM/DL — SIGNIFICANT CHANGE UP (ref 32–36)
MCV RBC AUTO: 80.2 FL — SIGNIFICANT CHANGE UP (ref 80–100)
MCV RBC AUTO: 80.2 FL — SIGNIFICANT CHANGE UP (ref 80–100)
PLATELET # BLD AUTO: 337 K/UL — SIGNIFICANT CHANGE UP (ref 150–400)
PLATELET # BLD AUTO: 337 K/UL — SIGNIFICANT CHANGE UP (ref 150–400)
POTASSIUM SERPL-MCNC: 4.3 MMOL/L — SIGNIFICANT CHANGE UP (ref 3.5–5.3)
POTASSIUM SERPL-MCNC: 4.3 MMOL/L — SIGNIFICANT CHANGE UP (ref 3.5–5.3)
POTASSIUM SERPL-SCNC: 4.3 MMOL/L — SIGNIFICANT CHANGE UP (ref 3.5–5.3)
POTASSIUM SERPL-SCNC: 4.3 MMOL/L — SIGNIFICANT CHANGE UP (ref 3.5–5.3)
RBC # BLD: 4.19 M/UL — SIGNIFICANT CHANGE UP (ref 3.8–5.2)
RBC # BLD: 4.19 M/UL — SIGNIFICANT CHANGE UP (ref 3.8–5.2)
RBC # FLD: 13.9 % — SIGNIFICANT CHANGE UP (ref 10.3–14.5)
RBC # FLD: 13.9 % — SIGNIFICANT CHANGE UP (ref 10.3–14.5)
SODIUM SERPL-SCNC: 135 MMOL/L — SIGNIFICANT CHANGE UP (ref 135–145)
SODIUM SERPL-SCNC: 135 MMOL/L — SIGNIFICANT CHANGE UP (ref 135–145)
WBC # BLD: 8.41 K/UL — SIGNIFICANT CHANGE UP (ref 3.8–10.5)
WBC # BLD: 8.41 K/UL — SIGNIFICANT CHANGE UP (ref 3.8–10.5)
WBC # FLD AUTO: 8.41 K/UL — SIGNIFICANT CHANGE UP (ref 3.8–10.5)
WBC # FLD AUTO: 8.41 K/UL — SIGNIFICANT CHANGE UP (ref 3.8–10.5)

## 2023-12-19 PROCEDURE — 93306 TTE W/DOPPLER COMPLETE: CPT

## 2023-12-19 PROCEDURE — 84443 ASSAY THYROID STIM HORMONE: CPT

## 2023-12-19 PROCEDURE — 83935 ASSAY OF URINE OSMOLALITY: CPT

## 2023-12-19 PROCEDURE — 85027 COMPLETE CBC AUTOMATED: CPT

## 2023-12-19 PROCEDURE — 84156 ASSAY OF PROTEIN URINE: CPT

## 2023-12-19 PROCEDURE — 83880 ASSAY OF NATRIURETIC PEPTIDE: CPT

## 2023-12-19 PROCEDURE — 85025 COMPLETE CBC W/AUTO DIFF WBC: CPT

## 2023-12-19 PROCEDURE — 81003 URINALYSIS AUTO W/O SCOPE: CPT

## 2023-12-19 PROCEDURE — 80053 COMPREHEN METABOLIC PANEL: CPT

## 2023-12-19 PROCEDURE — 72131 CT LUMBAR SPINE W/O DYE: CPT

## 2023-12-19 PROCEDURE — 85730 THROMBOPLASTIN TIME PARTIAL: CPT

## 2023-12-19 PROCEDURE — 84540 ASSAY OF URINE/UREA-N: CPT

## 2023-12-19 PROCEDURE — 99285 EMERGENCY DEPT VISIT HI MDM: CPT | Mod: 25

## 2023-12-19 PROCEDURE — 70450 CT HEAD/BRAIN W/O DYE: CPT

## 2023-12-19 PROCEDURE — 72125 CT NECK SPINE W/O DYE: CPT

## 2023-12-19 PROCEDURE — 87086 URINE CULTURE/COLONY COUNT: CPT

## 2023-12-19 PROCEDURE — 93005 ELECTROCARDIOGRAM TRACING: CPT

## 2023-12-19 PROCEDURE — 71045 X-RAY EXAM CHEST 1 VIEW: CPT

## 2023-12-19 PROCEDURE — 82803 BLOOD GASES ANY COMBINATION: CPT

## 2023-12-19 PROCEDURE — 99239 HOSP IP/OBS DSCHRG MGMT >30: CPT

## 2023-12-19 PROCEDURE — 83930 ASSAY OF BLOOD OSMOLALITY: CPT

## 2023-12-19 PROCEDURE — 82306 VITAMIN D 25 HYDROXY: CPT

## 2023-12-19 PROCEDURE — 80061 LIPID PANEL: CPT

## 2023-12-19 PROCEDURE — 82607 VITAMIN B-12: CPT

## 2023-12-19 PROCEDURE — 36415 COLL VENOUS BLD VENIPUNCTURE: CPT

## 2023-12-19 PROCEDURE — 82962 GLUCOSE BLOOD TEST: CPT

## 2023-12-19 PROCEDURE — 84100 ASSAY OF PHOSPHORUS: CPT

## 2023-12-19 PROCEDURE — 82009 KETONE BODYS QUAL: CPT

## 2023-12-19 PROCEDURE — 84300 ASSAY OF URINE SODIUM: CPT

## 2023-12-19 PROCEDURE — 84133 ASSAY OF URINE POTASSIUM: CPT

## 2023-12-19 PROCEDURE — 80048 BASIC METABOLIC PNL TOTAL CA: CPT

## 2023-12-19 PROCEDURE — 86803 HEPATITIS C AB TEST: CPT

## 2023-12-19 PROCEDURE — 83036 HEMOGLOBIN GLYCOSYLATED A1C: CPT

## 2023-12-19 PROCEDURE — 84484 ASSAY OF TROPONIN QUANT: CPT

## 2023-12-19 PROCEDURE — 82570 ASSAY OF URINE CREATININE: CPT

## 2023-12-19 PROCEDURE — 83735 ASSAY OF MAGNESIUM: CPT

## 2023-12-19 PROCEDURE — 93306 TTE W/DOPPLER COMPLETE: CPT | Mod: 26

## 2023-12-19 PROCEDURE — 85610 PROTHROMBIN TIME: CPT

## 2023-12-19 PROCEDURE — 84436 ASSAY OF TOTAL THYROXINE: CPT

## 2023-12-19 PROCEDURE — 99232 SBSQ HOSP IP/OBS MODERATE 35: CPT

## 2023-12-19 RX ORDER — AMLODIPINE BESYLATE 2.5 MG/1
1 TABLET ORAL
Qty: 0 | Refills: 0 | DISCHARGE
Start: 2023-12-19

## 2023-12-19 RX ORDER — PREGABALIN 225 MG/1
1 CAPSULE ORAL
Qty: 30 | Refills: 2
Start: 2023-12-19 | End: 2024-03-17

## 2023-12-19 RX ORDER — SIMVASTATIN 20 MG/1
1 TABLET, FILM COATED ORAL
Qty: 0 | Refills: 0 | DISCHARGE
Start: 2023-12-19

## 2023-12-19 RX ORDER — SITAGLIPTIN AND METFORMIN HYDROCHLORIDE 500; 50 MG/1; MG/1
1 TABLET, FILM COATED ORAL
Refills: 0 | DISCHARGE

## 2023-12-19 RX ORDER — KETOROLAC TROMETHAMINE 30 MG/ML
15 SYRINGE (ML) INJECTION ONCE
Refills: 0 | Status: DISCONTINUED | OUTPATIENT
Start: 2023-12-19 | End: 2023-12-19

## 2023-12-19 RX ORDER — SITAGLIPTIN AND METFORMIN HYDROCHLORIDE 500; 50 MG/1; MG/1
1 TABLET, FILM COATED ORAL
Qty: 60 | Refills: 0
Start: 2023-12-19 | End: 2024-01-17

## 2023-12-19 RX ORDER — AMLODIPINE BESYLATE 2.5 MG/1
1 TABLET ORAL
Refills: 0 | DISCHARGE

## 2023-12-19 RX ORDER — LOSARTAN POTASSIUM 100 MG/1
1 TABLET, FILM COATED ORAL
Qty: 30 | Refills: 0
Start: 2023-12-19 | End: 2024-01-17

## 2023-12-19 RX ORDER — ASPIRIN/CALCIUM CARB/MAGNESIUM 324 MG
1 TABLET ORAL
Qty: 0 | Refills: 0 | DISCHARGE
Start: 2023-12-19

## 2023-12-19 RX ADMIN — Medication 2: at 11:48

## 2023-12-19 RX ADMIN — Medication 15 MILLIGRAM(S): at 12:48

## 2023-12-19 RX ADMIN — Medication 81 MILLIGRAM(S): at 11:47

## 2023-12-19 RX ADMIN — FAMOTIDINE 20 MILLIGRAM(S): 10 INJECTION INTRAVENOUS at 11:47

## 2023-12-19 RX ADMIN — Medication 50 MICROGRAM(S): at 06:17

## 2023-12-19 RX ADMIN — LOSARTAN POTASSIUM 25 MILLIGRAM(S): 100 TABLET, FILM COATED ORAL at 06:17

## 2023-12-19 RX ADMIN — Medication 15 MILLIGRAM(S): at 11:48

## 2023-12-19 RX ADMIN — AMLODIPINE BESYLATE 10 MILLIGRAM(S): 2.5 TABLET ORAL at 06:17

## 2023-12-19 NOTE — DISCHARGE NOTE PROVIDER - HOSPITAL COURSE
This is a 73 year old [place] speaking female - daughter wishes to translate w/ PMHx of hypothyroidism, nephrolithiasis /P lithotripsy), HTN, HLD and DM2 - noncompliant with meds X 3 months presents for evaluation of sudden onset palpitations, lasting about 1 hour.  Associated with B/L feet paresthesias > hand paresthesias.  She denies any previous episodes, now resolved.  States she has been prescribed levemir X 6 months, however discontinued 3 months ago as she developed nodules at injection sites.  Also was placed on levemir 6 months prior as her hyperglycemia was uncontrolled on "3 pills" - unknown which.  +dizziness - which she currently has, and has continued X 1 month.  +headache B/L radiating to her temporal area B/L, intermittently X 1 month.  Tried "over the counter meds" unknown which without relief.  +nausea.  +lethargy X 2 days.  +10 pound weight loss unintentional weight loss.   Of note her  passed away unexpectedly 1 month prior.   +frequency of urination 1 month prior, was treated with unknown antibiotics without relief. Pt is with labile BG started lantus 10 on day 1 , BG is low in am , decreased dose to 5 unit and endocrinology consulted . Pt is also with dizziness BP is high added losartan , TTE performed normal EF . CT of brain no acute pathology   d/w daughter and pt , instructed her to follow up with PCP , see endocrinology and neurology for dizziness pending insurance application        This is a 73 year old [place]ese speaking female - daughter wishes to translate w/ PMHx of hypothyroidism, nephrolithiasis /P lithotripsy), HTN, HLD and DM2 - noncompliant with meds X 3 months presents for evaluation of sudden onset palpitations, lasting about 1 hour.  Associated with B/L feet paresthesias > hand paresthesias.  She denies any previous episodes, now resolved.  States she has been prescribed levemir X 6 months, however discontinued 3 months ago as she developed nodules at injection sites.  Also was placed on levemir 6 months prior as her hyperglycemia was uncontrolled on "3 pills" - unknown which.  +dizziness - which she currently has, and has continued X 1 month.  +headache B/L radiating to her temporal area B/L, intermittently X 1 month.  Tried "over the counter meds" unknown which without relief.  +nausea.  +lethargy X 2 days.  +10 pound weight loss unintentional weight loss.   Of note her  passed away unexpectedly 1 month prior.   +frequency of urination 1 month prior, was treated with unknown antibiotics without relief. Pt is with labile BG started lantus 10 on day 1 , BG is low in am , decreased dose to 5 unit and endocrinology consulted . Pt is also with dizziness BP is high added losartan , TTE performed normal EF . CT of brain no acute pathology   d/w daughter and pt , instructed her to follow up with PCP , see endocrinology and neurology for dizziness pending insurance application

## 2023-12-19 NOTE — PROGRESS NOTE ADULT - ASSESSMENT
73F with PMHx of T2D, HTN, HLD, Hypothyroidism presents for evaluation of sudden onset palpitations, associated with hand and feet parasthesisas.     Consulted for diabetes management  Home regimen: Janumet  mg BID  Current a1c: 8.3%    1. T2DM  - Labile glucoses, yesterday was 390 at lunch (not verified with 2nd check and was likely not accurate) then glucose 46 after correction  - Continue lantus 5 units daily  - Sliding scale coverage  - Can discharge on increased dose of Janumet  mg BID    2. Hypothyroidism  - Continue levothyroxine  - TSH in normal range     3. Palpitations  - Monitor on telemetry, care per primary team

## 2023-12-19 NOTE — PROGRESS NOTE ADULT - SUBJECTIVE AND OBJECTIVE BOX
INTERVAL EVENTS:  Follow up diabetes management    ROS: Complains of right knee pain, no chest pain or sob.     MEDICATIONS  (STANDING):  amLODIPine   Tablet 10 milliGRAM(s) Oral daily  aspirin enteric coated 81 milliGRAM(s) Oral daily  dextrose 5%. 1000 milliLiter(s) (100 mL/Hr) IV Continuous <Continuous>  dextrose 5%. 1000 milliLiter(s) (50 mL/Hr) IV Continuous <Continuous>  dextrose 50% Injectable 25 Gram(s) IV Push once  dextrose 50% Injectable 25 Gram(s) IV Push once  dextrose 50% Injectable 12.5 Gram(s) IV Push once  enoxaparin Injectable 40 milliGRAM(s) SubCutaneous every 24 hours  famotidine    Tablet 20 milliGRAM(s) Oral daily  glucagon  Injectable 1 milliGRAM(s) IntraMuscular once  influenza  Vaccine (HIGH DOSE) 0.7 milliLiter(s) IntraMuscular once  insulin glargine Injectable (LANTUS) 5 Unit(s) SubCutaneous at bedtime  insulin lispro (ADMELOG) corrective regimen sliding scale   SubCutaneous three times a day before meals  levothyroxine 50 MICROGram(s) Oral <User Schedule>  levothyroxine 100 MICROGram(s) Oral <User Schedule>  losartan 25 milliGRAM(s) Oral daily  simvastatin 10 milliGRAM(s) Oral at bedtime  traZODone 50 milliGRAM(s) Oral at bedtime    MEDICATIONS  (PRN):  acetaminophen     Tablet .. 650 milliGRAM(s) Oral every 6 hours PRN Temp greater or equal to 38C (100.4F), Mild Pain (1 - 3)  aluminum hydroxide/magnesium hydroxide/simethicone Suspension 30 milliLiter(s) Oral every 4 hours PRN Dyspepsia  dextrose Oral Gel 15 Gram(s) Oral once PRN Blood Glucose LESS THAN 70 milliGRAM(s)/deciliter  melatonin 3 milliGRAM(s) Oral at bedtime PRN Insomnia  ondansetron Injectable 4 milliGRAM(s) IV Push every 8 hours PRN Nausea and/or Vomiting    Allergies  No Known Allergies    Vital Signs Last 24 Hrs  T(C): 37.1 (19 Dec 2023 11:19), Max: 37.1 (19 Dec 2023 11:19)  T(F): 98.8 (19 Dec 2023 11:19), Max: 98.8 (19 Dec 2023 11:19)  HR: 73 (19 Dec 2023 11:19) (65 - 78)  BP: 148/62 (19 Dec 2023 11:19) (124/71 - 160/66)  BP(mean): --  RR: 18 (19 Dec 2023 11:19) (18 - 18)  SpO2: 95% (19 Dec 2023 11:19) (95% - 97%)    Parameters below as of 19 Dec 2023 11:19  Patient On (Oxygen Delivery Method): room air    PHYSICAL EXAM:  General: No apparent distress  Neck: Supple, trachea midline, no thyromegaly  Respiratory: Lungs clear bilaterally, normal rate, effort  Cardiac: +S1, S2, no m/r/g  GI: +BS, soft, non tender, non distended  Extremities: No peripheral edema, no pedal lesions  Neuro: A+O X3, no tremor    LABS:                        11.5   8.41  )-----------( 337      ( 19 Dec 2023 09:34 )             33.6     12-19    135  |  98  |  15.0  ----------------------------<  158<H>  4.3   |  27.0  |  0.60    Ca    9.1      19 Dec 2023 09:34  Phos  3.2     12-18  Mg     2.2     12-18      Urinalysis Basic - ( 19 Dec 2023 09:34 )    Color: x / Appearance: x / SG: x / pH: x  Gluc: 158 mg/dL / Ketone: x  / Bili: x / Urobili: x   Blood: x / Protein: x / Nitrite: x   Leuk Esterase: x / RBC: x / WBC x   Sq Epi: x / Non Sq Epi: x / Bacteria: x    POCT Blood Glucose.: 249 mg/dL (12-19-23 @ 11:42)  POCT Blood Glucose.: 150 mg/dL (12-19-23 @ 08:41)  POCT Blood Glucose.: 257 mg/dL (12-18-23 @ 23:23)  POCT Blood Glucose.: 242 mg/dL (12-18-23 @ 16:20)  POCT Blood Glucose.: 46 mg/dL (12-18-23 @ 15:50)    Thyroid Stimulating Hormone, Serum: 3.11 uIU/mL (12-16-23 @ 16:00)

## 2023-12-19 NOTE — DISCHARGE NOTE PROVIDER - NSDCFUSCHEDAPPT_GEN_ALL_CORE_FT
Debra Walker  [facility] Physician [address]  Scheduled Appointment: 01/03/2024     Derba Walker  [facility] Physician [address]  Scheduled Appointment: 01/03/2024

## 2023-12-19 NOTE — DISCHARGE NOTE NURSING/CASE MANAGEMENT/SOCIAL WORK - PATIENT PORTAL LINK FT
You can access the FollowMyHealth Patient Portal offered by [facility] by registering at the following website: [email or web address]. By joining [employer]’s FollowMyHealth portal, you will also be able to view your health information using other applications (apps) compatible with our system. You can access the FollowMyHealth Patient Portal offered by [facility] by registering at the following website: [email or web address]. By joining [employer]’s FollowMyHealth portal, you will also be able to view your health information using other applications (apps) compatible with our system.

## 2023-12-19 NOTE — DISCHARGE NOTE PROVIDER - CARE PROVIDER_API CALL
Debra Walker  Family Medicine  [address] [place] [address]  Phone: [phone]  Fax: [phone]  Follow Up Time: 2 weeks   Debra Walker  Family Medicine  [address] [place] [address]  Phone: [phone]  Fax: [phone]  Follow Up Time: 2 weeks

## 2023-12-19 NOTE — DISCHARGE NOTE PROVIDER - NSDCCPCAREPLAN_GEN_ALL_CORE_FT
PRINCIPAL DISCHARGE DIAGNOSIS  Diagnosis: Hyponatremia  Assessment and Plan of Treatment: improved      SECONDARY DISCHARGE DIAGNOSES  Diagnosis: Hyperglycemia  Assessment and Plan of Treatment: with Diabetes Mellitus , continue janumet dose increased '  monitor BG and see primary care pyhsician for further treatment   stop diamicron ( nmedication from [place] )       Diagnosis: Hypomagnesemia  Assessment and Plan of Treatment: replaced    Diagnosis: Type 2 diabetes mellitus  Assessment and Plan of Treatment:     Diagnosis: Primary hypothyroidism  Assessment and Plan of Treatment:     Diagnosis: Hypertension  Assessment and Plan of Treatment: monitor your blood pressurer  take medication as prescribed    Diagnosis: Low vitamin B12 level  Assessment and Plan of Treatment: continue daily supplement    
[Consultation] : a consultation visit

## 2023-12-19 NOTE — DISCHARGE NOTE PROVIDER - NSDCMRMEDTOKEN_GEN_ALL_CORE_FT
amLODIPine 10 mg oral tablet: 1 tab(s) orally once a day in am  aspirin 81 mg oral delayed release tablet: 1 tab(s) orally once a day  Desyrel 50 mg oral tablet: orally once a day  Janumet 50 mg-1000 mg oral tablet: 1 tab(s) orally 2 times a day  levothyroxine 50 mcg (0.05 mg) oral tablet: 2 tab(s) orally 2 times a week  levothyroxine 50 mcg (0.05 mg) oral tablet: 1 tab(s) orally 5 times a week  losartan 25 mg oral tablet: 1 tab(s) orally once a day (at bedtime) 8 pm  simvastatin 10 mg oral tablet: 1 tab(s) orally once a day (at bedtime)

## 2023-12-19 NOTE — DISCHARGE NOTE PROVIDER - PROVIDER TOKENS
PROVIDER:[TOKEN:[[id number]],FOLLOWUP:[2 weeks]] PROVIDER:[TOKEN:[[id number]],FOLLOWUP:[2 weeks]]

## 2023-12-19 NOTE — DISCHARGE NOTE NURSING/CASE MANAGEMENT/SOCIAL WORK - NSDCPEFALRISK_GEN_ALL_CORE
For information on Fall & Injury Prevention, visit: [email or web address] OR  [email or web address] OR  https://www.cdc.gov/steadi/patient.html For information on Fall & Injury Prevention, visit: [email or web address] OR  [email or web address] OR  https://www.cdc.gov/steadi/patient.html

## 2023-12-19 NOTE — PROGRESS NOTE ADULT - NS ATTEND AMEND GEN_ALL_CORE FT
I have seen and examined patient with NP, agree with above assessment and plan.
I have seen and examined patient with NP, agree with above assessment and plan.

## 2023-12-28 PROBLEM — E78.5 HYPERLIPIDEMIA, UNSPECIFIED: Chronic | Status: ACTIVE | Noted: 2023-12-16

## 2023-12-28 PROBLEM — E03.9 HYPOTHYROIDISM, UNSPECIFIED: Chronic | Status: ACTIVE | Noted: 2023-12-16

## 2023-12-28 PROBLEM — I10 ESSENTIAL (PRIMARY) HYPERTENSION: Chronic | Status: ACTIVE | Noted: 2023-12-16

## 2023-12-28 PROBLEM — E11.9 TYPE 2 DIABETES MELLITUS WITHOUT COMPLICATIONS: Chronic | Status: ACTIVE | Noted: 2023-12-16

## 2024-01-03 ENCOUNTER — APPOINTMENT (OUTPATIENT)
Dept: INTERNAL MEDICINE | Facility: CLINIC | Age: 74
End: 2024-01-03
Payer: MEDICARE

## 2024-01-03 ENCOUNTER — APPOINTMENT (OUTPATIENT)
Dept: FAMILY MEDICINE | Facility: CLINIC | Age: 74
End: 2024-01-03

## 2024-01-03 VITALS
TEMPERATURE: 98.2 F | DIASTOLIC BLOOD PRESSURE: 70 MMHG | HEIGHT: 59 IN | OXYGEN SATURATION: 98 % | HEART RATE: 70 BPM | SYSTOLIC BLOOD PRESSURE: 156 MMHG | WEIGHT: 93 LBS | BODY MASS INDEX: 18.75 KG/M2

## 2024-01-03 DIAGNOSIS — Z09 ENCOUNTER FOR FOLLOW-UP EXAMINATION AFTER COMPLETED TREATMENT FOR CONDITIONS OTHER THAN MALIGNANT NEOPLASM: ICD-10-CM

## 2024-01-03 DIAGNOSIS — R07.89 OTHER CHEST PAIN: ICD-10-CM

## 2024-01-03 PROCEDURE — 99214 OFFICE O/P EST MOD 30 MIN: CPT

## 2024-01-03 PROCEDURE — 99495 TRANSJ CARE MGMT MOD F2F 14D: CPT

## 2024-01-03 RX ORDER — SODIUM SULFATE, POTASSIUM SULFATE, MAGNESIUM SULFATE 17.5; 3.13; 1.6 G/ML; G/ML; G/ML
17.5-3.13-1.6 SOLUTION, CONCENTRATE ORAL
Qty: 1 | Refills: 0 | Status: DISCONTINUED | COMMUNITY
Start: 2019-05-14 | End: 2024-01-03

## 2024-01-03 RX ORDER — OMEPRAZOLE 40 MG/1
40 CAPSULE, DELAYED RELEASE ORAL
Qty: 30 | Refills: 3 | Status: DISCONTINUED | COMMUNITY
Start: 2019-05-28 | End: 2024-01-03

## 2024-01-06 PROBLEM — Z09 HOSPITAL DISCHARGE FOLLOW-UP: Status: ACTIVE | Noted: 2024-01-06

## 2024-01-06 NOTE — HISTORY OF PRESENT ILLNESS
[Post-hospitalization from ___ Hospital] : Post-hospitalization from [unfilled] Hospital [Admitted on: ___] : The patient was admitted on [unfilled] [Discharged on ___] : discharged on [unfilled] [Discharge Summary] : discharge summary [Pertinent Labs] : pertinent labs [Radiology Findings] : radiology findings [Discharge Med List] : discharge medication list [Med Reconciliation] : medication reconciliation has been completed [FreeTextEntry2] : 72 y/o F Pmhx DM2, HLD, HTN, depression who was hospitalized at [facility] due to  feeling week and paresthesia of extremities, found to have hyponatremia here for post discharge follow up.  Patient was not taking her DM2 medication for around 3 months prior to admission [address] and  on admission [facility] with no acute finding.  Discharged on Janumet   Patient reports ongoing diarrhea for 2-3 months, cannot get to the bath room, multiple loose bowel movements, reports apetite well, but as soon as she eats food has a BM, lost 17 lbs in 3 months. Last colonoscopy 3 years ago.

## 2024-01-06 NOTE — REVIEW OF SYSTEMS
[Fever] : no fever [Chills] : no chills [Recent Change In Weight] : ~T recent weight change [Discharge] : no discharge [Vision Problems] : no vision problems [Earache] : no earache [Nasal Discharge] : no nasal discharge [Palpitations] : no palpitations [Chest Pain] : no chest pain [Leg Claudication] : no leg claudication [Shortness Of Breath] : no shortness of breath [Wheezing] : no wheezing [Cough] : no cough [Abdominal Pain] : no abdominal pain [Nausea] : no nausea [Diarrhea] : diarrhea [Vomiting] : no vomiting [Heartburn] : no heartburn [Melena] : no melena [Dysuria] : no dysuria [Hematuria] : no hematuria [Joint Pain] : no joint pain [Muscle Pain] : no muscle pain [Itching] : no itching [Skin Rash] : no skin rash [Headache] : no headache [Dizziness] : no dizziness [Fainting] : no fainting [Confusion] : no confusion [Suicidal] : not suicidal [Insomnia] : no insomnia [Anxiety] : no anxiety [Depression] : no depression [Easy Bleeding] : no easy bleeding [Swollen Glands] : swollen glands [Easy Bruising] : no easy bruising

## 2024-01-06 NOTE — PHYSICAL EXAM
[Normal Sclera/Conjunctiva] : normal sclera/conjunctiva [Normal Outer Ear/Nose] : the outer ears and nose were normal in appearance [Normal Oropharynx] : the oropharynx was normal [No Lymphadenopathy] : no lymphadenopathy [No Respiratory Distress] : no respiratory distress  [No Accessory Muscle Use] : no accessory muscle use [Clear to Auscultation] : lungs were clear to auscultation bilaterally [Normal Rate] : normal rate  [Regular Rhythm] : with a regular rhythm [Normal S1, S2] : normal S1 and S2 [No Edema] : there was no peripheral edema [Soft] : abdomen soft [Non Tender] : non-tender [Non-distended] : non-distended [Normal Posterior Cervical Nodes] : no posterior cervical lymphadenopathy [Normal Anterior Cervical Nodes] : no anterior cervical lymphadenopathy [No CVA Tenderness] : no CVA  tenderness [No Rash] : no rash [No Focal Deficits] : no focal deficits [Normal Gait] : normal gait [Normal Affect] : the affect was normal [Alert and Oriented x3] : oriented to person, place, and time [Normal Insight/Judgement] : insight and judgment were intact [de-identified] : ayush frail female, pleasant, calm cooperative [50728 - Moderate Complexity requires multiple possible diagnoses and/or the management options, moderate complexity of the medical data (tests, etc.) to be reviewed, and moderate risk of significant complications, morbidity, and/or mortality as well as co] : Moderate Complexity

## 2024-01-09 LAB
ALBUMIN SERPL ELPH-MCNC: 4.5 G/DL
ALP BLD-CCNC: 70 U/L
ALT SERPL-CCNC: 10 U/L
ANION GAP SERPL CALC-SCNC: 17 MMOL/L
AST SERPL-CCNC: 7 U/L
BILIRUB SERPL-MCNC: 0.2 MG/DL
BUN SERPL-MCNC: 17 MG/DL
CALCIUM SERPL-MCNC: 9.8 MG/DL
CHLORIDE SERPL-SCNC: 96 MMOL/L
CO2 SERPL-SCNC: 20 MMOL/L
CREAT SERPL-MCNC: 0.6 MG/DL
EGFR: 94 ML/MIN/1.73M2
GLUCOSE SERPL-MCNC: 220 MG/DL
MAGNESIUM SERPL-MCNC: 1.7 MG/DL
POTASSIUM SERPL-SCNC: 4.9 MMOL/L
PROT SERPL-MCNC: 6.5 G/DL
SODIUM SERPL-SCNC: 133 MMOL/L

## 2024-01-12 LAB
BACTERIA STL CULT: NORMAL
CDIFF BY PCR: NOT DETECTED
DEPRECATED O AND P PREP STL: NORMAL

## 2024-01-15 LAB
CALPROTECTIN FECAL: 36 UG/G
E HISTOLYT AG STL IA-ACNC: NOT DETECTED

## 2024-01-22 ENCOUNTER — APPOINTMENT (OUTPATIENT)
Dept: ENDOCRINOLOGY | Facility: CLINIC | Age: 74
End: 2024-01-22
Payer: MEDICARE

## 2024-01-22 VITALS — OXYGEN SATURATION: 98 % | SYSTOLIC BLOOD PRESSURE: 110 MMHG | HEART RATE: 76 BPM | DIASTOLIC BLOOD PRESSURE: 70 MMHG

## 2024-01-22 VITALS — WEIGHT: 96 LBS | HEIGHT: 59 IN | BODY MASS INDEX: 19.35 KG/M2

## 2024-01-22 LAB — GLUCOSE BLDC GLUCOMTR-MCNC: 206

## 2024-01-22 PROCEDURE — 82962 GLUCOSE BLOOD TEST: CPT

## 2024-01-22 PROCEDURE — 99215 OFFICE O/P EST HI 40 MIN: CPT

## 2024-01-23 ENCOUNTER — APPOINTMENT (OUTPATIENT)
Dept: OBGYN | Facility: CLINIC | Age: 74
End: 2024-01-23
Payer: MEDICARE

## 2024-01-23 ENCOUNTER — NON-APPOINTMENT (OUTPATIENT)
Age: 74
End: 2024-01-23

## 2024-01-23 VITALS
HEIGHT: 59 IN | DIASTOLIC BLOOD PRESSURE: 63 MMHG | BODY MASS INDEX: 19.35 KG/M2 | SYSTOLIC BLOOD PRESSURE: 179 MMHG | WEIGHT: 96 LBS | HEART RATE: 78 BPM

## 2024-01-23 DIAGNOSIS — Z00.00 ENCOUNTER FOR GENERAL ADULT MEDICAL EXAMINATION W/OUT ABNORMAL FINDINGS: ICD-10-CM

## 2024-01-23 DIAGNOSIS — Z12.39 ENCOUNTER FOR OTHER SCREENING FOR MALIGNANT NEOPLASM OF BREAST: ICD-10-CM

## 2024-01-23 DIAGNOSIS — R32 UNSPECIFIED URINARY INCONTINENCE: ICD-10-CM

## 2024-01-23 DIAGNOSIS — Z12.31 ENCOUNTER FOR SCREENING MAMMOGRAM FOR MALIGNANT NEOPLASM OF BREAST: ICD-10-CM

## 2024-01-23 DIAGNOSIS — N95.0 POSTMENOPAUSAL BLEEDING: ICD-10-CM

## 2024-01-23 LAB
ALBUMIN SERPL ELPH-MCNC: 4.5 G/DL
ALP BLD-CCNC: 81 U/L
ALT SERPL-CCNC: 12 U/L
ANION GAP SERPL CALC-SCNC: 11 MMOL/L
AST SERPL-CCNC: 12 U/L
BILIRUB SERPL-MCNC: 0.3 MG/DL
BILIRUB UR QL STRIP: NEGATIVE
BUN SERPL-MCNC: 16 MG/DL
C PEPTIDE SERPL-MCNC: 1.8 NG/ML
CALCIUM SERPL-MCNC: 9.7 MG/DL
CHLORIDE SERPL-SCNC: 96 MMOL/L
CHOLEST SERPL-MCNC: 125 MG/DL
CLARITY UR: CLEAR
CO2 SERPL-SCNC: 26 MMOL/L
COLLECTION METHOD: NORMAL
CREAT SERPL-MCNC: 0.61 MG/DL
CREAT SPEC-SCNC: 84 MG/DL
EGFR: 93 ML/MIN/1.73M2
ESTIMATED AVERAGE GLUCOSE: 194 MG/DL
GLUCOSE SERPL-MCNC: 221 MG/DL
GLUCOSE UR-MCNC: NORMAL
HBA1C MFR BLD HPLC: 8.4 %
HCG UR QL: 0 EU/DL
HDLC SERPL-MCNC: 50 MG/DL
HEMOGLOBIN: 11.3
HGB UR QL STRIP.AUTO: NEGATIVE
KETONES UR-MCNC: NEGATIVE
LDLC SERPL CALC-MCNC: 64 MG/DL
LEUKOCYTE ESTERASE UR QL STRIP: NEGATIVE
MICROALBUMIN 24H UR DL<=1MG/L-MCNC: 3.5 MG/DL
MICROALBUMIN/CREAT 24H UR-RTO: 41 MG/G
NITRITE UR QL STRIP: NEGATIVE
NONHDLC SERPL-MCNC: 75 MG/DL
PH UR STRIP: 6
POTASSIUM SERPL-SCNC: 5 MMOL/L
PROT SERPL-MCNC: 6.7 G/DL
PROT UR STRIP-MCNC: NEGATIVE
SODIUM SERPL-SCNC: 133 MMOL/L
SP GR UR STRIP: 1
TRIGL SERPL-MCNC: 44 MG/DL
TSH SERPL-ACNC: 3.52 UIU/ML

## 2024-01-23 PROCEDURE — 81003 URINALYSIS AUTO W/O SCOPE: CPT | Mod: QW

## 2024-01-23 PROCEDURE — G0101: CPT

## 2024-01-23 PROCEDURE — 85018 HEMOGLOBIN: CPT | Mod: QW

## 2024-01-23 NOTE — HISTORY OF PRESENT ILLNESS
[FreeTextEntry1] : Patient is a 74 yo female here today for new patient appt. patient speaks Hebrew she is accompanied by her daughter  she complains of PMB which occurred while she was in [place]. she states she had a biopsy done but poor historian and cannot obtain records. She is due for her pap smear, mammogram and bone density    she complains of incontinence of urine and stool.  She has never had a colonoscopy

## 2024-01-23 NOTE — PHYSICAL EXAM
[Appropriately responsive] : appropriately responsive [Alert] : alert [No Acute Distress] : no acute distress [No Lymphadenopathy] : no lymphadenopathy [Soft] : soft [Non-tender] : non-tender [Non-distended] : non-distended [No HSM] : No HSM [No Lesions] : no lesions [No Mass] : no mass [Oriented x3] : oriented x3 [Examination Of The Breasts] : a normal appearance [No Masses] : no breast masses were palpable [Labia Majora] : normal [Labia Minora] : normal [Normal] : normal [Uterine Adnexae] : normal [Normal rectal exam] : was normal [Occult Blood Positive] : was negative for occult blood analysis

## 2024-01-23 NOTE — PLAN
[FreeTextEntry1] :   Patient to follow up in 1 year for annual GYN exam Mammogram : now rx given  Colonoscopy due:  now she has an appt for next month  Bone density due:  now rx given   Will give rx for pelvic sonogram to evaluate endometrial lining, will follow up with patient with results. if thickened plan endo bx   she was referred to Dr. MCQUEEN for urinary incontinence.  Will send UA/UC today to evaluate for UTI.   Pap ordered Hemoccult ordered  All questions answered, patient agreeable with plan.

## 2024-01-24 ENCOUNTER — NON-APPOINTMENT (OUTPATIENT)
Age: 74
End: 2024-01-24

## 2024-01-25 LAB — BACTERIA UR CULT: NORMAL

## 2024-01-28 LAB — CYTOLOGY CVX/VAG DOC THIN PREP: ABNORMAL

## 2024-01-29 ENCOUNTER — APPOINTMENT (OUTPATIENT)
Dept: ULTRASOUND IMAGING | Facility: CLINIC | Age: 74
End: 2024-01-29
Payer: MEDICARE

## 2024-01-29 ENCOUNTER — RESULT REVIEW (OUTPATIENT)
Age: 74
End: 2024-01-29

## 2024-01-29 ENCOUNTER — APPOINTMENT (OUTPATIENT)
Dept: UROGYNECOLOGY | Facility: CLINIC | Age: 74
End: 2024-01-29
Payer: MEDICARE

## 2024-01-29 ENCOUNTER — APPOINTMENT (OUTPATIENT)
Dept: RADIOLOGY | Facility: CLINIC | Age: 74
End: 2024-01-29
Payer: MEDICARE

## 2024-01-29 ENCOUNTER — OUTPATIENT (OUTPATIENT)
Dept: OUTPATIENT SERVICES | Facility: HOSPITAL | Age: 74
LOS: 1 days | End: 2024-01-29
Payer: MEDICARE

## 2024-01-29 ENCOUNTER — APPOINTMENT (OUTPATIENT)
Dept: MAMMOGRAPHY | Facility: CLINIC | Age: 74
End: 2024-01-29
Payer: MEDICARE

## 2024-01-29 VITALS
HEART RATE: 80 BPM | HEIGHT: 59 IN | BODY MASS INDEX: 19.35 KG/M2 | DIASTOLIC BLOOD PRESSURE: 71 MMHG | SYSTOLIC BLOOD PRESSURE: 136 MMHG | WEIGHT: 96 LBS

## 2024-01-29 DIAGNOSIS — N95.0 POSTMENOPAUSAL BLEEDING: ICD-10-CM

## 2024-01-29 DIAGNOSIS — Z82.49 FAMILY HISTORY OF ISCHEMIC HEART DISEASE AND OTHER DISEASES OF THE CIRCULATORY SYSTEM: ICD-10-CM

## 2024-01-29 DIAGNOSIS — Z00.00 ENCOUNTER FOR GENERAL ADULT MEDICAL EXAMINATION WITHOUT ABNORMAL FINDINGS: ICD-10-CM

## 2024-01-29 DIAGNOSIS — Z83.3 FAMILY HISTORY OF DIABETES MELLITUS: ICD-10-CM

## 2024-01-29 DIAGNOSIS — Z12.31 ENCOUNTER FOR SCREENING MAMMOGRAM FOR MALIGNANT NEOPLASM OF BREAST: ICD-10-CM

## 2024-01-29 LAB
BILIRUB UR QL STRIP: NORMAL
CLARITY UR: CLEAR
COLLECTION METHOD: NORMAL
GLUCOSE UR-MCNC: 500
HCG UR QL: 0.2 EU/DL
HGB UR QL STRIP.AUTO: NORMAL
KETONES UR-MCNC: NORMAL
LEUKOCYTE ESTERASE UR QL STRIP: NORMAL
NITRITE UR QL STRIP: NORMAL
PH UR STRIP: 6.5
PROT UR STRIP-MCNC: NORMAL
SP GR UR STRIP: 1.01

## 2024-01-29 PROCEDURE — 51701 INSERT BLADDER CATHETER: CPT | Mod: 59

## 2024-01-29 PROCEDURE — 77067 SCR MAMMO BI INCL CAD: CPT

## 2024-01-29 PROCEDURE — 77080 DXA BONE DENSITY AXIAL: CPT

## 2024-01-29 PROCEDURE — 77063 BREAST TOMOSYNTHESIS BI: CPT | Mod: 26

## 2024-01-29 PROCEDURE — 76830 TRANSVAGINAL US NON-OB: CPT | Mod: 26

## 2024-01-29 PROCEDURE — 99204 OFFICE O/P NEW MOD 45 MIN: CPT | Mod: 25

## 2024-01-29 PROCEDURE — 77063 BREAST TOMOSYNTHESIS BI: CPT

## 2024-01-29 PROCEDURE — 77067 SCR MAMMO BI INCL CAD: CPT | Mod: 26

## 2024-01-29 PROCEDURE — 77080 DXA BONE DENSITY AXIAL: CPT | Mod: 26

## 2024-01-29 PROCEDURE — 99459 PELVIC EXAMINATION: CPT

## 2024-01-29 PROCEDURE — 76830 TRANSVAGINAL US NON-OB: CPT

## 2024-01-29 PROCEDURE — 81003 URINALYSIS AUTO W/O SCOPE: CPT | Mod: QW

## 2024-01-29 NOTE — HISTORY OF PRESENT ILLNESS
[Vaginal Wall Prolapse] : no [Rectal Prolapse] : no [Unable To Restrain Bowel Movement] : mild [Feelings Of Urinary Urgency] : mild [x3+] : nocturia three or more  times a night [Urinary Tract Infection] : mild [Incomplete Emptying Of Stool] : severe [] : years ago [Pelvic Pain] : no [Vaginal Pain] : no [FreeTextEntry1] : ELADIO is a 73 year female who presents for urinary and bowel incontinence. Patient states that she's been having loss of stool without any prior sensation, and that has been going on for years. More recently, for the past 6 months, she's been having loss of urine with a strong urge to void, but she doesn't make it to the bathroom. She goes through 4 pads/day and needs to void every few hours. She wakes up about 5x to void at night. Denies leakage with cough, sneezing, laughing. Denies insensible loss. She does feel like she fully empties her bladder. Denies dysuria, hematuria. Does not c/o prolapse. She does report a h/o PMB (menopause at 53y) and had a benign biopsy in [place]. This morning, she again noticed some spotting. Not sexually active. No complaints of constipation. Last colonoscopy 3 years ago.   HgbA1C 8.4 on 2024  Accompanied by daughter Bárbara to translate entire visit.   Daytime frequency: Yes Nocturia: Yes Urinary urgency: Yes Leakage of urine with urgency: Yes  Leakage of urine with coughing sneezing laughing: Yes  Incontinence pad use: 5 episodes per night Bowel symptoms: Reports loss of stool daily.    OB: [birth year]  GYN: Normal paps, Oral estrogen PMH: hypothyroidism, DM2, HTN PSH: denies Meds: Amlodipine, losartan, synthroid, janumet Allx: denies

## 2024-01-29 NOTE — DISCUSSION/SUMMARY
[FreeTextEntry1] : ELADIO is a 73 year female who presents for OAB and FI. On exam, normal PVR, no POP. Patient is not interested in OAB medications, FI very bothersome.   We reviewed her symptoms and exam findings. We discussed management options for overactive bladder including observation, behavioral modifications and bladder training, physical therapy and medications including anticholinergics and beta 3 agonists. We discussed if behavioral modifications and medications fail proceeding with urodynamics to further evaluate her symptoms. We discussed additional treatment options including sacral neuromodulation, PTNS and intra detrusor Botox. IUGA handout on overactive bladder and bladder training was given to her.  [] UDS [] Cysto  [] Fecal incontinence management with Lomotil, Imodium, etc.  [] 3 day voiding diary provided  [] Anticipate PNE [] f/u with gyn re bleeding - has sono today   All questions answered with patient and her daughter Bárbara.

## 2024-01-29 NOTE — PROCEDURE
[Straight Catheterization] : insertion of a straight catheter [Urinary Frequency] : urinary frequency [Patient] : the patient [___ Fr Straight Tip] : a [unfilled] in [place]ian straight tip catheter [None] : there were no complications with the catheter insertion [Clear] : clear [No Complications] : no complications [Tolerated Well] : the patient tolerated the procedure well [Post procedure instructions and information given] : Post procedure instructions and information were given and reviewed with patient. [0] : 0

## 2024-01-29 NOTE — ADDENDUM
[FreeTextEntry1] : This note was written by Rashmi Kaur, acting as the  for Dr. Pérez. This note accurately reflects the work and decisions made by Dr. Pérez.

## 2024-01-29 NOTE — REASON FOR VISIT
[Initial Visit ___] : an initial visit for [unfilled] [Questionnaire Received] : Patient questionnaire received [Intake Form Reviewed] : Patient intake form with past medical history, surgical history, family history and social history reviewed today [Urinary Incontinence] : urinary incontinence [Family Member] : family member [Problems With Defecation] : problems with defecation [Urine Frequency] : urine frequency [Urinary Urgency] : urinary urgency

## 2024-01-29 NOTE — PHYSICAL EXAM
[Chaperone Present] : A chaperone was present in the examining room during all aspects of the physical examination [No Acute Distress] : in no acute distress [Well developed] : well developed [Well Nourished] : ~L well nourished [Oriented x3] : oriented to person, place, and time [No Edema] : ~T edema was not present [Warm and Dry] : was warm and dry to touch [Vulvar Atrophy] : vulvar atrophy [Labia Majora] : were normal [Labia Minora] : were normal [Normal Appearance] : general appearance was normal [Atrophy] : atrophy [Soft] :  the cervix was soft [Uterine Adnexae] : were not tender and not enlarged [Normal] : no abnormalities [Post Void Residual ____ml] : post void residual was [unfilled] ml [Normal rectal exam] : was normal [Cough] : no cough [Tenderness] : ~T no ~M abdominal tenderness observed [Distended] : not distended [de-identified] : no pop

## 2024-01-30 LAB
APPEARANCE: CLEAR
BILIRUBIN URINE: NEGATIVE
BLOOD URINE: NEGATIVE
COLOR: YELLOW
GLUCOSE QUALITATIVE U: >=1000 MG/DL
KETONES URINE: NEGATIVE MG/DL
LEUKOCYTE ESTERASE URINE: NEGATIVE
NITRITE URINE: NEGATIVE
PH URINE: 6
PROTEIN URINE: NEGATIVE MG/DL
SPECIFIC GRAVITY URINE: 1.02
UROBILINOGEN URINE: 0.2 MG/DL

## 2024-01-31 ENCOUNTER — APPOINTMENT (OUTPATIENT)
Dept: UROGYNECOLOGY | Facility: CLINIC | Age: 74
End: 2024-01-31

## 2024-02-02 ENCOUNTER — OUTPATIENT (OUTPATIENT)
Dept: OUTPATIENT SERVICES | Facility: HOSPITAL | Age: 74
LOS: 1 days | End: 2024-02-02
Payer: MEDICARE

## 2024-02-02 ENCOUNTER — RESULT REVIEW (OUTPATIENT)
Age: 74
End: 2024-02-02

## 2024-02-02 ENCOUNTER — APPOINTMENT (OUTPATIENT)
Dept: MAMMOGRAPHY | Facility: CLINIC | Age: 74
End: 2024-02-02
Payer: MEDICARE

## 2024-02-02 ENCOUNTER — APPOINTMENT (OUTPATIENT)
Dept: ULTRASOUND IMAGING | Facility: CLINIC | Age: 74
End: 2024-02-02
Payer: MEDICARE

## 2024-02-02 DIAGNOSIS — R92.8 OTHER ABNORMAL AND INCONCLUSIVE FINDINGS ON DIAGNOSTIC IMAGING OF BREAST: ICD-10-CM

## 2024-02-02 PROCEDURE — G0279: CPT

## 2024-02-02 PROCEDURE — 77065 DX MAMMO INCL CAD UNI: CPT

## 2024-02-02 PROCEDURE — G0279: CPT | Mod: 26

## 2024-02-02 PROCEDURE — 76642 ULTRASOUND BREAST LIMITED: CPT

## 2024-02-02 PROCEDURE — 77065 DX MAMMO INCL CAD UNI: CPT | Mod: 26,LT

## 2024-02-02 PROCEDURE — 76642 ULTRASOUND BREAST LIMITED: CPT | Mod: 26,LT

## 2024-02-06 ENCOUNTER — APPOINTMENT (OUTPATIENT)
Dept: GASTROENTEROLOGY | Facility: CLINIC | Age: 74
End: 2024-02-06
Payer: MEDICARE

## 2024-02-06 ENCOUNTER — NON-APPOINTMENT (OUTPATIENT)
Age: 74
End: 2024-02-06

## 2024-02-06 VITALS
HEIGHT: 59 IN | WEIGHT: 93 LBS | BODY MASS INDEX: 18.75 KG/M2 | SYSTOLIC BLOOD PRESSURE: 130 MMHG | DIASTOLIC BLOOD PRESSURE: 60 MMHG

## 2024-02-06 DIAGNOSIS — R13.10 DYSPHAGIA, UNSPECIFIED: ICD-10-CM

## 2024-02-06 PROCEDURE — 99204 OFFICE O/P NEW MOD 45 MIN: CPT

## 2024-02-06 NOTE — PHYSICAL EXAM
(1) Urology referral to Ms. Dahl, please help set up [Alert] : alert [Healthy Appearing] : healthy appearing [Sclera] : the sclera and conjunctiva were normal [Hearing Threshold Finger Rub Not [place]] : hearing was normal [Normal Appearance] : the appearance of the neck was normal [No Respiratory Distress] : no respiratory distress [Auscultation Breath Sounds / Voice Sounds] : lungs were clear to auscultation bilaterally [Heart Rate And Rhythm] : heart rate was normal and rhythm regular [Bowel Sounds] : normal bowel sounds [Abdomen Tenderness] : non-tender [Abdomen Soft] : soft [Abnormal Walk] : normal gait [Normal Color / Pigmentation] : normal skin color and pigmentation [Oriented To Time, Place, And Person] : oriented to person, place, and time

## 2024-02-06 NOTE — REVIEW OF SYSTEMS
[As Noted in HPI] : as noted in HPI [Fecal Incontinence (soiling)] : fecal incontinence [Negative] : Heme/Lymph [Abdominal Pain] : no abdominal pain [Vomiting] : no vomiting [Constipation] : no constipation [Diarrhea] : no diarrhea [Heartburn] : no heartburn [Melena (black stool)] : no melena [Bleeding] : no bleeding [Bloating (gassiness)] : no bloating

## 2024-02-06 NOTE — HISTORY OF PRESENT ILLNESS
[FreeTextEntry1] : Cathi Shah is a 73 year old female presenting today with her daughter translating at bedside for multiple complaints after hospitalization. Admission was unrelated to GI issues, was severely hyponatremic. Pt reports she has struggled for several years with fecal incontinence. Is unaware of leakage and will go to restroom for another reason and find her underwear stained. Denies any episodes of bleeding such as melena or hematochezia. Has some urinary incontinence as well, is undergoing urology workup as well. Also reports feeling of something lodged in her throat, has to clear her throat constantly throughout the day. Denies overt nausea, vomiting, or GERD symptoms.

## 2024-02-06 NOTE — ASSESSMENT
[FreeTextEntry1] : Plan: Discussed at length that fecal incontinence is common in women of her age, given she also has trouble with urinary incontinence likely some level of pelvic floor dysfunction. Pt undergoing workup with urology, would recommend completing that prior however can offer pelvic floor pt referral to help. Also discussed incorporating fiber supplementation to bind her as well. For dysphagia, would recommend esophagram. Daughter is hesitant to have pt undergo anesthesia at her age. If any abnormalities, can reconsider. Pt agrees, all questions answered. Seen and discussed with Dr. Foote.

## 2024-02-07 ENCOUNTER — APPOINTMENT (OUTPATIENT)
Dept: BREAST CENTER | Facility: CLINIC | Age: 74
End: 2024-02-07
Payer: MEDICARE

## 2024-02-07 ENCOUNTER — APPOINTMENT (OUTPATIENT)
Dept: INTERNAL MEDICINE | Facility: CLINIC | Age: 74
End: 2024-02-07
Payer: MEDICARE

## 2024-02-07 VITALS — BODY MASS INDEX: 19.15 KG/M2 | HEIGHT: 59 IN | RESPIRATION RATE: 16 BRPM | WEIGHT: 95 LBS

## 2024-02-07 VITALS
TEMPERATURE: 98 F | DIASTOLIC BLOOD PRESSURE: 72 MMHG | HEIGHT: 59 IN | SYSTOLIC BLOOD PRESSURE: 148 MMHG | HEART RATE: 56 BPM | OXYGEN SATURATION: 99 % | WEIGHT: 94 LBS | BODY MASS INDEX: 18.95 KG/M2

## 2024-02-07 DIAGNOSIS — Z23 ENCOUNTER FOR IMMUNIZATION: ICD-10-CM

## 2024-02-07 DIAGNOSIS — E03.9 HYPOTHYROIDISM, UNSPECIFIED: ICD-10-CM

## 2024-02-07 DIAGNOSIS — R53.81 OTHER MALAISE: ICD-10-CM

## 2024-02-07 DIAGNOSIS — R19.7 DIARRHEA, UNSPECIFIED: ICD-10-CM

## 2024-02-07 DIAGNOSIS — H81.10 BENIGN PAROXYSMAL VERTIGO, UNSPECIFIED EAR: ICD-10-CM

## 2024-02-07 PROCEDURE — 99213 OFFICE O/P EST LOW 20 MIN: CPT

## 2024-02-07 PROCEDURE — 76642 ULTRASOUND BREAST LIMITED: CPT | Mod: LT

## 2024-02-07 PROCEDURE — G0008: CPT

## 2024-02-07 PROCEDURE — 90662 IIV NO PRSV INCREASED AG IM: CPT

## 2024-02-07 PROCEDURE — G2211 COMPLEX E/M VISIT ADD ON: CPT

## 2024-02-07 PROCEDURE — 99203 OFFICE O/P NEW LOW 30 MIN: CPT | Mod: 25

## 2024-02-07 RX ORDER — LEVOTHYROXINE SODIUM 0.05 MG/1
50 TABLET ORAL DAILY
Qty: 90 | Refills: 0 | Status: DISCONTINUED | COMMUNITY
Start: 2024-01-09 | End: 2024-02-07

## 2024-02-07 RX ORDER — SITAGLIPTIN AND METFORMIN HYDROCHLORIDE 50; 1000 MG/1; MG/1
50-1000 TABLET, FILM COATED ORAL
Qty: 30 | Refills: 0 | Status: DISCONTINUED | COMMUNITY
Start: 2023-12-19 | End: 2024-02-07

## 2024-02-07 RX ORDER — LEVOTHYROXINE SODIUM 0.05 MG/1
50 TABLET ORAL DAILY
Qty: 90 | Refills: 0 | Status: DISCONTINUED | COMMUNITY
Start: 2024-01-10 | End: 2024-02-07

## 2024-02-07 NOTE — REVIEW OF SYSTEMS
[Fever] : no fever [Chills] : no chills [Discharge] : no discharge [Vision Problems] : no vision problems [Earache] : no earache [Nasal Discharge] : no nasal discharge [Chest Pain] : no chest pain [Palpitations] : no palpitations [Shortness Of Breath] : no shortness of breath [Cough] : no cough [Abdominal Pain] : no abdominal pain [Nausea] : no nausea [Constipation] : no constipation [Dysuria] : no dysuria [Itching] : no itching [Skin Rash] : no skin rash [Headache] : headache [Dizziness] : dizziness [Fainting] : no fainting [Confusion] : no confusion [Memory Loss] : no memory loss [Unsteady Walking] : no ataxia [Suicidal] : not suicidal [Insomnia] : no insomnia [Anxiety] : no anxiety

## 2024-02-07 NOTE — HISTORY OF PRESENT ILLNESS
[FreeTextEntry1] : f/u [de-identified] : 74 y/o F Pmhx DM2, HLD, HTN, depression who was hospitalized at [facility] due to feeling week and paresthesia of extremities, hyponatremia( causes hospitalizationin Dec/23 ), frequent BM(with stool soling),   Last see in Jan post hospital visit and was evaluated for multiple issues was given multiple refrral for her issues, returns to follow up  Interval since last visit: Seen by gisselle, now on Janumet,  Dimicrone only of sugars are above 250 in the morning to minimize the hypoglycemia risk Recent b/w by gisselle in J/22/24 Na 133 Was seen by GI was told pelvic floor dysfunction. Was seen by urogyn, has w/u pending. Was seen by gyn and had mammogram which showed some suspicious finding and has a biopsy scheduled. Had dexa scan and diagnosed with osteoporosis was recommend to start medication, oral versus injectables  Today reports dizziness with head movements, denies weakness, fall, cp, sob.  Has no had the flu, recent covid or rsv shots

## 2024-02-07 NOTE — PHYSICAL EXAM
[No Acute Distress] : no acute distress [Well-Appearing] : well-appearing [Normal Sclera/Conjunctiva] : normal sclera/conjunctiva [Normal Outer Ear/Nose] : the outer ears and nose were normal in appearance [No JVD] : no jugular venous distention [No Respiratory Distress] : no respiratory distress  [No Accessory Muscle Use] : no accessory muscle use [Clear to Auscultation] : lungs were clear to auscultation bilaterally [Normal Rate] : normal rate  [Regular Rhythm] : with a regular rhythm [Normal S1, S2] : normal S1 and S2 [No Edema] : there was no peripheral edema [Soft] : abdomen soft [Non Tender] : non-tender [Non-distended] : non-distended [No Joint Swelling] : no joint swelling [No Rash] : no rash [Coordination Grossly Intact] : coordination grossly intact [No Focal Deficits] : no focal deficits [Normal Affect] : the affect was normal [Alert and Oriented x3] : oriented to person, place, and time [Normal Insight/Judgement] : insight and judgment were intact

## 2024-02-08 ENCOUNTER — APPOINTMENT (OUTPATIENT)
Dept: UROGYNECOLOGY | Facility: CLINIC | Age: 74
End: 2024-02-08
Payer: MEDICARE

## 2024-02-08 PROCEDURE — 51741 ELECTRO-UROFLOWMETRY FIRST: CPT

## 2024-02-08 PROCEDURE — 51729 CYSTOMETROGRAM W/VP&UP: CPT

## 2024-02-08 PROCEDURE — 51784 ANAL/URINARY MUSCLE STUDY: CPT

## 2024-02-08 PROCEDURE — 51797 INTRAABDOMINAL PRESSURE TEST: CPT

## 2024-02-09 NOTE — HISTORY OF PRESENT ILLNESS
[FreeTextEntry1] : Patient referred by Laureen Alfaro. Patient had recent imaging 2/2/24 at [facility].  Architectural distortion noted left breast 9:00 on mammo without ultrasound correlate.  Left stereotactic biopsy is recommended, patient has scheduled for 2/22/24. No family history breast CA. Denies breast pain, palpable mass, nipple discharge.

## 2024-02-09 NOTE — ASSESSMENT
[FreeTextEntry1] : Indeterminate mass identified on recent left mammogram  There is no palpable mass or sonographic abnormality  The patient was assisted in scheduling stereotactic left breast biopsy  Further recommendations will be made pending the results of the biopsy  A total of 30 minutes was spent in consultation evaluation review

## 2024-02-09 NOTE — PROCEDURE
[FreeTextEntry3] : Ultrasound left breast  The patient has architectural distortion noted in the medial left breast  Targeted exam demonstrates no focal findings  Stereotactic biopsy advised  BI-RADS 0

## 2024-02-09 NOTE — PHYSICAL EXAM
[Normocephalic] : normocephalic [Sclera nonicteric] : sclera nonicteric [No Supraclavicular Adenopathy] : no supraclavicular adenopathy [Clear to Auscultation Bilat] : clear to auscultation bilaterally [No Axillary Lymphadenopathy] : no left axillary lymphadenopathy [Soft] : abdomen soft [No Edema] : no edema [No Rashes] : no rashes [No Ulceration] : no ulceration [de-identified] : No palpable mass noted left breast

## 2024-02-16 ENCOUNTER — NON-APPOINTMENT (OUTPATIENT)
Age: 74
End: 2024-02-16

## 2024-02-19 RX ORDER — BLOOD-GLUCOSE METER
W/DEVICE EACH MISCELLANEOUS
Qty: 1 | Refills: 0 | Status: ACTIVE | COMMUNITY
Start: 2024-01-22 | End: 1900-01-01

## 2024-02-19 RX ORDER — LANCETS 33 GAUGE
EACH MISCELLANEOUS
Qty: 1 | Refills: 1 | Status: ACTIVE | COMMUNITY
Start: 2024-01-22 | End: 1900-01-01

## 2024-02-22 ENCOUNTER — RESULT REVIEW (OUTPATIENT)
Age: 74
End: 2024-02-22

## 2024-02-22 ENCOUNTER — APPOINTMENT (OUTPATIENT)
Dept: MAMMOGRAPHY | Facility: CLINIC | Age: 74
End: 2024-02-22

## 2024-02-22 ENCOUNTER — OUTPATIENT (OUTPATIENT)
Dept: OUTPATIENT SERVICES | Facility: HOSPITAL | Age: 74
LOS: 1 days | End: 2024-02-22
Payer: MEDICARE

## 2024-02-22 DIAGNOSIS — R92.8 OTHER ABNORMAL AND INCONCLUSIVE FINDINGS ON DIAGNOSTIC IMAGING OF BREAST: ICD-10-CM

## 2024-02-22 PROCEDURE — 19081 BX BREAST 1ST LESION STRTCTC: CPT | Mod: LT

## 2024-02-22 PROCEDURE — 77065 DX MAMMO INCL CAD UNI: CPT | Mod: 26,LT

## 2024-02-22 PROCEDURE — 77065 DX MAMMO INCL CAD UNI: CPT

## 2024-02-22 PROCEDURE — A4648: CPT

## 2024-02-22 PROCEDURE — 19081 BX BREAST 1ST LESION STRTCTC: CPT

## 2024-02-26 ENCOUNTER — RX RENEWAL (OUTPATIENT)
Age: 74
End: 2024-02-26

## 2024-02-27 ENCOUNTER — NON-APPOINTMENT (OUTPATIENT)
Age: 74
End: 2024-02-27

## 2024-02-28 ENCOUNTER — RX RENEWAL (OUTPATIENT)
Age: 74
End: 2024-02-28

## 2024-02-29 ENCOUNTER — APPOINTMENT (OUTPATIENT)
Dept: UROGYNECOLOGY | Facility: CLINIC | Age: 74
End: 2024-02-29
Payer: MEDICARE

## 2024-02-29 PROCEDURE — 64561 IMPLANT NEUROELECTRODES: CPT | Mod: 50

## 2024-03-04 ENCOUNTER — APPOINTMENT (OUTPATIENT)
Dept: UROGYNECOLOGY | Facility: CLINIC | Age: 74
End: 2024-03-04
Payer: MEDICARE

## 2024-03-04 DIAGNOSIS — R15.9 FULL INCONTINENCE OF FECES: ICD-10-CM

## 2024-03-04 PROCEDURE — 99024 POSTOP FOLLOW-UP VISIT: CPT

## 2024-03-04 NOTE — HISTORY OF PRESENT ILLNESS
[FreeTextEntry1] : Pt presents to office with her daughter who provided translation and helped provide history.  She is s/p BL PNE on 2/29/24, presents today for lead pull.  Per daughter, she reports "at least" 50 % improvement in her symptoms.  Voiding diary was sent to [facility].  She is scheduled for Stage II implant on 5/15/24, however, daughter states she will need to r/s that as pt needs to go to [place].

## 2024-03-04 NOTE — PHYSICAL EXAM
[No Acute Distress] : in no acute distress [Well developed] : well developed [Well Nourished] : ~L well nourished [Good Hygeine] : demonstrates good hygeine [Oriented x3] : oriented to person, place, and time [Normal Memory] : ~T memory was ~L unimpaired [Normal Mood/Affect] : mood and affect are normal [Normal Appearance] : ~T the appearance of the neck was normal [Warm and Dry] : was warm and dry to touch [Normal Gait] : gait was normal

## 2024-03-04 NOTE — DISCUSSION/SUMMARY
[FreeTextEntry1] : BL leads removed, intact.  Pt aware that symptoms will return.  Surgical coordinator aware that pt needs to r/s surgery and will call.  Instructed to call with any questions or concerns and she verbalizes understanding.

## 2024-03-06 ENCOUNTER — APPOINTMENT (OUTPATIENT)
Dept: BREAST CENTER | Facility: CLINIC | Age: 74
End: 2024-03-06
Payer: MEDICARE

## 2024-03-06 ENCOUNTER — RESULT REVIEW (OUTPATIENT)
Age: 74
End: 2024-03-06

## 2024-03-06 VITALS — HEIGHT: 59 IN | BODY MASS INDEX: 19.15 KG/M2 | RESPIRATION RATE: 16 BRPM | WEIGHT: 95 LBS

## 2024-03-06 DIAGNOSIS — N64.89 OTHER SPECIFIED DISORDERS OF BREAST: ICD-10-CM

## 2024-03-06 PROCEDURE — 19285Z: CUSTOM | Mod: LT

## 2024-03-06 PROCEDURE — 99214 OFFICE O/P EST MOD 30 MIN: CPT | Mod: 25

## 2024-03-06 NOTE — PROCEDURE
[FreeTextEntry3] :  ultrasound-guided placement of Kathleen clip into left breast asymmetry  The patient has left breast asymmetry and excisional biopsy is planned.  The area of asymmetry is located in the upper inner left breast  A localizing clip is visualized  After informed consent was obtained, sterile prep and drape was applied and a Kathleen localizing clip was placed into the left breast under direct vision  No procedure related complications were noted

## 2024-03-06 NOTE — HISTORY OF PRESENT ILLNESS
[FreeTextEntry1] :  the patient returns to the office for interval assessment  Recent stereotactic left breast biopsy to further evaluate an area of asymmetry returned with benign tissue.  The radiologist feels that this is just concordant and complete excision is advised  The patient denies palpable breast mass

## 2024-03-06 NOTE — PHYSICAL EXAM
[Normocephalic] : normocephalic [Atraumatic] : atraumatic [Sclera nonicteric] : sclera nonicteric [Examined in the supine and seated position] : examined in the supine and seated position [No Supraclavicular Adenopathy] : no supraclavicular adenopathy [No dominant masses] : no dominant masses in right breast  [No dominant masses] : no dominant masses left breast [No Nipple Retraction] : no left nipple retraction [No Nipple Discharge] : no left nipple discharge [No Axillary Lymphadenopathy] : no left axillary lymphadenopathy [No Edema] : no edema [No Rashes] : no rashes [No Ulceration] : no ulceration

## 2024-03-06 NOTE — ASSESSMENT
[FreeTextEntry1] : Mammographic asymmetry in the upper inner left breast  Kathleen clip placed  Patient advised post procedure mammogram  Left breast lumpectomy will be performed  The relative risks and benefits were reviewed  A total of 45 minutes was spent in consultation

## 2024-03-07 ENCOUNTER — APPOINTMENT (OUTPATIENT)
Dept: RHEUMATOLOGY | Facility: CLINIC | Age: 74
End: 2024-03-07
Payer: MEDICARE

## 2024-03-07 VITALS
BODY MASS INDEX: 19.15 KG/M2 | SYSTOLIC BLOOD PRESSURE: 128 MMHG | WEIGHT: 95 LBS | DIASTOLIC BLOOD PRESSURE: 62 MMHG | TEMPERATURE: 96.5 F | HEIGHT: 59 IN | HEART RATE: 66 BPM | OXYGEN SATURATION: 96 %

## 2024-03-07 DIAGNOSIS — R79.89 OTHER SPECIFIED ABNORMAL FINDINGS OF BLOOD CHEMISTRY: ICD-10-CM

## 2024-03-07 DIAGNOSIS — Z79.83 LONG TERM (CURRENT) USE OF BISPHOSPHONATES: ICD-10-CM

## 2024-03-07 PROCEDURE — 99204 OFFICE O/P NEW MOD 45 MIN: CPT

## 2024-03-07 NOTE — HISTORY OF PRESENT ILLNESS
[FreeTextEntry1] : 73 year old F with PMHx of  DM2, HLD, HTN, depression, hypothyroidism,   Coming in for eval of osteoporosis ; T score femoral neck -2.6, total hip -2.0   Denies cancers, radiation therapy, heartburn, dental problems or planned dental surgeries  She is pending a lumpectomy for removal of benign tumor on left breast. No plan for radiation. She has hx of implants but no current active dental issues.  She denies hx fragility fractures. She does not take calcium or vitamin D regularly.   DEXA scan 1/2024 Spine: -0.7, normal. Femoral neck: -2.6 , osteoporosis. Total hip: -2.0 , osteopenia. IMPRESSION: Osteoporosis.     PMHx: As above PSHx: denies Family Hx: Denies family history of osteoporosis  Social Hx:  Smoking Hx: denies EtOH Hx: denies Drug use: denies :

## 2024-03-07 NOTE — PHYSICAL EXAM
[TextEntry] :   GENERAL: Appears in no acute distress HEENT: EOMI. No conjunctival erythema. Moist mucous membranes CARDIOVASCULAR: RRR. S1, S2 auscultated.  PULMONARY: Clear to auscultation b/l, MSK: No active synovitis, swelling, erythema, or warmth. No joint tenderness to palpation. No spinal tenderness  Heberdens nodes No deformities. No crepitus. SKIN: No lesions or rashes NEURO: No focal deficits.  PSYCH:  Normal affect and thought process.

## 2024-03-07 NOTE — ASSESSMENT
[FreeTextEntry1] : 73 year old F with PMHx of  DM2, HLD, HTN, depression, hypothyroidism,   Coming in for eval of osteoporosis ; T score femoral neck -2.6, total hip -2.0   Denies cancers, radiation therapy, heartburn, dental problems or planned dental surgeries  She is pending a lumpectomy for removal of benign tumor on left breast. No plan for radiation. She has hx of implants but no current active dental issues.  She denies hx fragility fractures. She does not take calcium or vitamin D regularly.   DEXA scan 1/2024 Spine: -0.7, normal. Femoral neck: -2.6 , osteoporosis. Total hip: -2.0 , osteopenia. IMPRESSION: Osteoporosis.   - Discussed bisphosphates and Prolia in detail. Patient would like to start alendronic acid [Fosamax] 70mg weekly -Side effects of bisphosphonates were discussed with the pt in detail including bone pain and flu-like illness x 2-3 days, ONJ, atypical femoral fractures Advised to take on empty stomach in morning with full glass of water and to remain upright and NPO for 30-60 mins. - Advised to take Calcium 1000mg daily and Vitamin D 1000 IU daily. -Advised to reduce caffeine intake, increase exercise, and minimize fall risk. -Labs ordered as below  follow up 1 year Total time spent in review of patient history, clinical exam, management, counseling, and plan of care:  50min

## 2024-03-08 DIAGNOSIS — Z83.49 FAMILY HISTORY OF OTHER ENDOCRINE, NUTRITIONAL AND METABOLIC DISEASES: ICD-10-CM

## 2024-03-08 DIAGNOSIS — E78.5 HYPERLIPIDEMIA, UNSPECIFIED: ICD-10-CM

## 2024-03-08 DIAGNOSIS — Z83.438 FAMILY HISTORY OF OTHER DISORDER OF LIPOPROTEIN METABOLISM AND OTHER LIPIDEMIA: ICD-10-CM

## 2024-03-08 DIAGNOSIS — E05.90 THYROTOXICOSIS, UNSPECIFIED W/OUT THYROTOXIC CRISIS OR STORM: ICD-10-CM

## 2024-03-08 DIAGNOSIS — Z86.79 PERSONAL HISTORY OF OTHER DISEASES OF THE CIRCULATORY SYSTEM: ICD-10-CM

## 2024-03-08 DIAGNOSIS — Z82.61 FAMILY HISTORY OF ARTHRITIS: ICD-10-CM

## 2024-03-08 LAB
25(OH)D3 SERPL-MCNC: 52.4 NG/ML
ALBUMIN SERPL ELPH-MCNC: 4.2 G/DL
ALP BLD-CCNC: 69 U/L
ALT SERPL-CCNC: 12 U/L
ANION GAP SERPL CALC-SCNC: 12 MMOL/L
AST SERPL-CCNC: 8 U/L
BILIRUB SERPL-MCNC: 0.2 MG/DL
BUN SERPL-MCNC: 24 MG/DL
CALCIUM SERPL-MCNC: 9.4 MG/DL
CHLORIDE SERPL-SCNC: 94 MMOL/L
CO2 SERPL-SCNC: 24 MMOL/L
CREAT SERPL-MCNC: 0.62 MG/DL
EGFR: 93 ML/MIN/1.73M2
GLUCOSE SERPL-MCNC: 358 MG/DL
HCT VFR BLD CALC: 37 %
HGB BLD-MCNC: 11.7 G/DL
MCHC RBC-ENTMCNC: 25.9 PG
MCHC RBC-ENTMCNC: 31.6 GM/DL
MCV RBC AUTO: 81.9 FL
PLATELET # BLD AUTO: 353 K/UL
POTASSIUM SERPL-SCNC: 5.1 MMOL/L
PROT SERPL-MCNC: 6.1 G/DL
RBC # BLD: 4.52 M/UL
RBC # FLD: 13.7 %
SODIUM SERPL-SCNC: 131 MMOL/L
WBC # FLD AUTO: 6.76 K/UL

## 2024-03-11 ENCOUNTER — APPOINTMENT (OUTPATIENT)
Dept: UROGYNECOLOGY | Facility: CLINIC | Age: 74
End: 2024-03-11
Payer: MEDICARE

## 2024-03-11 LAB
BILIRUB UR QL STRIP: NORMAL
CLARITY UR: CLEAR
COLLECTION METHOD: NORMAL
GLUCOSE UR-MCNC: NORMAL
HCG UR QL: 0.2 EU/DL
HGB UR QL STRIP.AUTO: 6
KETONES UR-MCNC: NORMAL
LEUKOCYTE ESTERASE UR QL STRIP: NORMAL
NITRITE UR QL STRIP: NEGATIVE
PH UR STRIP: 6
PROT UR STRIP-MCNC: NORMAL
SP GR UR STRIP: 1.02

## 2024-03-11 PROCEDURE — 99214 OFFICE O/P EST MOD 30 MIN: CPT | Mod: 25

## 2024-03-11 PROCEDURE — 51701 INSERT BLADDER CATHETER: CPT | Mod: 59

## 2024-03-11 PROCEDURE — 81003 URINALYSIS AUTO W/O SCOPE: CPT | Mod: QW

## 2024-03-11 RX ORDER — SULFAMETHOXAZOLE AND TRIMETHOPRIM 800; 160 MG/1; MG/1
800-160 TABLET ORAL TWICE DAILY
Qty: 14 | Refills: 0 | Status: ACTIVE | COMMUNITY
Start: 2024-03-11 | End: 1900-01-01

## 2024-03-11 NOTE — PHYSICAL EXAM
[Chaperone Present] : A chaperone was present in the examining room during all aspects of the physical examination [No Acute Distress] : in no acute distress [Well developed] : well developed [Well Nourished] : ~L well nourished [Oriented x3] : oriented to person, place, and time [No Edema] : ~T edema was not present [None] : no CVA tenderness [Warm and Dry] : was warm and dry to touch [Labia Majora] : were normal [Vulvar Atrophy] : vulvar atrophy [Labia Minora] : were normal [Atrophy] : atrophy [Normal Appearance] : general appearance was normal [Normal] : was normal [Cough] : no cough [Tenderness] : ~T no ~M abdominal tenderness observed [Distended] : not distended

## 2024-03-11 NOTE — DISCUSSION/SUMMARY
[FreeTextEntry1] : ELADIO is a 73 year female who presents for f/u on OAB/FI. C/o UTI symptoms today.   [] Ua, cx  [] 7 days Bactrim - risks/benefits discussed/rx sent  f/u 1 week for cysto  All questions answered with patient and her daughter translating visit.

## 2024-03-11 NOTE — PROCEDURE
[Straight Catheterization] : insertion of a straight catheter [Urinary Tract Infection] : a urinary tract infection [Patient] : the patient [___ Fr Straight Tip] : a [unfilled] in [place]an straight tip catheter [None] : there were no complications with the catheter insertion [Clear] : clear [Tolerated Well] : the patient tolerated the procedure well [No Complications] : no complications [Post procedure instructions and information given] : Post procedure instructions and information were given and reviewed with patient. [0] : 0

## 2024-03-11 NOTE — HISTORY OF PRESENT ILLNESS
[Vaginal Wall Prolapse] : no [Rectal Prolapse] : no [Unable To Restrain Bowel Movement] : mild [Feelings Of Urinary Urgency] : mild [x3+] : nocturia three or more  times a night [Urinary Tract Infection] : mild [Incomplete Emptying Of Stool] : severe [] : years ago [Pelvic Pain] : no [Vaginal Pain] : no [FreeTextEntry1] : ELADIO is a 73 year female who presents for f/u on OAB/FI. Presented today for cysto but c/o UTI symptoms of burning, malodor and increased frequency.  Patient is s/p BL PNE 02/29/2024, lead pull 03/04/2024. Per daughter, she reports "at least" 50 % improvement in her symptoms. States improvement in symptoms became significantly noticeable once leads were pulled. Patient also c/o FI. Full implant scheduled for 05/15/2024. Patient is leaving to visit [place] soon.

## 2024-03-14 ENCOUNTER — APPOINTMENT (OUTPATIENT)
Dept: ENDOCRINOLOGY | Facility: CLINIC | Age: 74
End: 2024-03-14
Payer: MEDICARE

## 2024-03-14 VITALS
HEART RATE: 71 BPM | SYSTOLIC BLOOD PRESSURE: 128 MMHG | BODY MASS INDEX: 19.76 KG/M2 | OXYGEN SATURATION: 96 % | WEIGHT: 98 LBS | HEIGHT: 59 IN | DIASTOLIC BLOOD PRESSURE: 64 MMHG

## 2024-03-14 DIAGNOSIS — M81.0 AGE-RELATED OSTEOPOROSIS W/OUT CURRENT PATHOLOGICAL FRACTURE: ICD-10-CM

## 2024-03-14 PROCEDURE — 99214 OFFICE O/P EST MOD 30 MIN: CPT

## 2024-03-14 PROCEDURE — G2211 COMPLEX E/M VISIT ADD ON: CPT

## 2024-03-14 NOTE — PHYSICAL EXAM
[Alert] : alert [No Respiratory Distress] : no respiratory distress [No Accessory Muscle Use] : no accessory muscle use [Clear to Auscultation] : lungs were clear to auscultation bilaterally [Normal S1, S2] : normal S1 and S2

## 2024-03-14 NOTE — PHYSICAL EXAM
[Alert] : alert [No Neck Mass] : no neck mass was observed [No LAD] : no lymphadenopathy [Thyroid Not Enlarged] : the thyroid was not enlarged [No Respiratory Distress] : no respiratory distress [No Accessory Muscle Use] : no accessory muscle use [Clear to Auscultation] : lungs were clear to auscultation bilaterally [Normal S1, S2] : normal S1 and S2

## 2024-03-14 NOTE — HISTORY OF PRESENT ILLNESS
[FreeTextEntry1] : 73 here for her for follow up of type 2 DM and hypothyroidism  Daughter help translating to [place]     History of DM: Diagnosed 2004 Severity: uncontrolled  Home regimen: Janumet 12.5-1000 BID  Diamicron (Gliclazide) 60 mg once daily, takes every day    Previous medications Levemir    Sugar checks: checks once in the morning, 200s  Hypoglycemia: none  Eye doctor: No retinopathy Neuropathy: She has neuropathy  Kidney disease: denied    Smoking: none  Exercise: Walks around the house    Labs: A1c 8.4  History of Hypothyroidism. Diagnosed for 15 years  On LT 50  5 days a week, and 100 twice a week  Good pill technique (early in the morning on empty stomach)   All other ROS reviewed and are negative except for the above  All labs reviewed

## 2024-03-14 NOTE — ASSESSMENT
[FreeTextEntry1] : Type 2 DM with hyperglycemia  A1c 8.3  in the office    Goal A1c 7-8 Discussed the side effects of Metformin that can include GI side effects such as and not limited to diarrhea. Also, metformin, should be stopped if kidney function deteriorated due to the risk of lactic acidosis. Also, Metformin should be stopped for 3 days before iodinated contrast studies or surgeries.  We agreed on the following plan today. On Janumet  mg twice daily, continue  Use Dimicrone only of sugars are above 250 in the morning to minimize the hypoglycemia risk. Plan to add Farxiga or Prandin in the future and stop the diamicrone  Continue to check sugars once daily  Please make an appointment 6-8 weeks  Continue on healthy diet. Continue to exercise. Please see an eye doctor Please see a foot doctor   Hypothyroidism  Clinically euthyroid Good pill technique (early in the morning on empty stomach) On LT4 50 5 days and 100 twice daily, continue  Check TFT     HLD On statin Check LDL    HTN Bp acceptable  On Ace inh/ARB   I spent 60 minutes discussing with patient face to face and non face to face reviewing documentations, labs, and/or imaging, also discussing the management plans.   RTC in 6-8 weeks

## 2024-03-14 NOTE — ASSESSMENT
[FreeTextEntry1] : Type 2 DM with hyperglycemia  A1c 8.4  in the office  Needs  Has UTI now   Goal A1c 7-8 Discussed the side effects of Metformin that can include GI side effects such as and not limited to diarrhea. Also, metformin, should be stopped if kidney function deteriorated due to the risk of lactic acidosis. Also, Metformin should be stopped for 3 days before iodinated contrast studies or surgeries.  We agreed on the following plan today. On Janumet  mg twice daily, continue  Stop Diamicrone  Start  Prandin 0.5 twice daily before meals  Continue to check sugars once daily  Continue on healthy diet. Continue to exercise. Please see an eye doctor Please see a foot doctor Continue on Levothyroxine as planned  Increase losartan to 50 mg daily  Check blood pressure and sugar daily and write them down   Hypothyroidism  Clinically euthyroid Good pill technique (early in the morning on empty stomach) On LT4 50 5 days and 100 twice weekly, continue  TFT acceptable     HLD On statin Acceptable LDL    HTN Bp acceptable here but high at home  Increase Losartan to 50 mg daily  Continue Amlod 10     Osteoporosis Will start Fosamax with Rheumatology    I spent 40 minutes discussing with patient face to face and non face to face reviewing documentations, labs, and/or imaging, also discussing the management plans.   RTC in May 2024

## 2024-03-14 NOTE — HISTORY OF PRESENT ILLNESS
[FreeTextEntry1] : 73 here for initial evaluation of type 2 DM and hypothyroidism  Daughter help translating to Spanish  Was admitted to the hospital in December 2023 for palpitation. Course of the admission was complicated labile blood sugars   She recently moved from [place], started Levemir but couldn't do, because she developed scars in here jo[place]     History of DM: Diagnosed 2004 Severity: uncontrolled  Home regimen: Janumet 12.5-1000 BID  Diamicron (Gliclazide) 60 mg once daily    Previous medications Levemir    Sugar checks: checks once in the morning, 194 today, 225 is the highest  Hypoglycemia: 160  Eye doctor: No retinopathy Neuropathy: She has neuropathy  Kidney disease: denied    Smoking: none  Exercise: Walks around the house    Labs: A1c 8.3  History of Hypothyroidism. Diagnosed for 15 years  On LT 50  5 days a week, and 100 twice a week  Good pill technique (early in the morning on empty stomach) Lost 10 pounds in the last year     All other ROS reviewed and are negative except for the above  All labs reviewed

## 2024-03-17 ENCOUNTER — INPATIENT (INPATIENT)
Facility: HOSPITAL | Age: 74
LOS: 1 days | Discharge: HOME | DRG: 641 | End: 2024-03-19
Attending: STUDENT IN AN ORGANIZED HEALTH CARE EDUCATION/TRAINING PROGRAM | Admitting: STUDENT IN AN ORGANIZED HEALTH CARE EDUCATION/TRAINING PROGRAM
Payer: COMMERCIAL

## 2024-03-17 VITALS
WEIGHT: 97 LBS | HEIGHT: 65 IN | TEMPERATURE: 99 F | SYSTOLIC BLOOD PRESSURE: 153 MMHG | HEART RATE: 89 BPM | RESPIRATION RATE: 18 BRPM | DIASTOLIC BLOOD PRESSURE: 71 MMHG | OXYGEN SATURATION: 98 %

## 2024-03-17 LAB
ALBUMIN SERPL ELPH-MCNC: 3.5 G/DL — SIGNIFICANT CHANGE UP (ref 3.3–5.2)
ALP SERPL-CCNC: 59 U/L — SIGNIFICANT CHANGE UP (ref 40–120)
ALT FLD-CCNC: 9 U/L — SIGNIFICANT CHANGE UP
ANION GAP SERPL CALC-SCNC: 12 MMOL/L — SIGNIFICANT CHANGE UP (ref 5–17)
APPEARANCE UR: CLEAR — SIGNIFICANT CHANGE UP
AST SERPL-CCNC: 12 U/L — SIGNIFICANT CHANGE UP
BASE EXCESS BLDV CALC-SCNC: -2 MMOL/L — SIGNIFICANT CHANGE UP (ref -2–3)
BASOPHILS # BLD AUTO: 0.04 K/UL — SIGNIFICANT CHANGE UP (ref 0–0.2)
BASOPHILS NFR BLD AUTO: 0.5 % — SIGNIFICANT CHANGE UP (ref 0–2)
BILIRUB SERPL-MCNC: <0.2 MG/DL — LOW (ref 0.4–2)
BILIRUB UR-MCNC: NEGATIVE — SIGNIFICANT CHANGE UP
BUN SERPL-MCNC: 20.4 MG/DL — HIGH (ref 8–20)
CA-I SERPL-SCNC: 1.1 MMOL/L — LOW (ref 1.15–1.33)
CALCIUM SERPL-MCNC: 8.1 MG/DL — LOW (ref 8.4–10.5)
CHLORIDE BLDV-SCNC: 91 MMOL/L — LOW (ref 96–108)
CHLORIDE SERPL-SCNC: 91 MMOL/L — LOW (ref 96–108)
CO2 SERPL-SCNC: 21 MMOL/L — LOW (ref 22–29)
COLOR SPEC: YELLOW — SIGNIFICANT CHANGE UP
CREAT SERPL-MCNC: 0.68 MG/DL — SIGNIFICANT CHANGE UP (ref 0.5–1.3)
DIFF PNL FLD: NEGATIVE — SIGNIFICANT CHANGE UP
EGFR: 91 ML/MIN/1.73M2 — SIGNIFICANT CHANGE UP
EOSINOPHIL # BLD AUTO: 0.14 K/UL — SIGNIFICANT CHANGE UP (ref 0–0.5)
EOSINOPHIL NFR BLD AUTO: 1.9 % — SIGNIFICANT CHANGE UP (ref 0–6)
GAS PNL BLDV: 119 MMOL/L — CRITICAL LOW (ref 136–145)
GAS PNL BLDV: SIGNIFICANT CHANGE UP
GLUCOSE BLDV-MCNC: 262 MG/DL — HIGH (ref 70–99)
GLUCOSE SERPL-MCNC: 246 MG/DL — HIGH (ref 70–99)
GLUCOSE UR QL: >=1000 MG/DL
HCO3 BLDV-SCNC: 23 MMOL/L — SIGNIFICANT CHANGE UP (ref 22–29)
HCT VFR BLD CALC: 30.3 % — LOW (ref 34.5–45)
HCT VFR BLDA CALC: 32 % — SIGNIFICANT CHANGE UP
HGB BLD CALC-MCNC: 10.8 G/DL — LOW (ref 11.7–16.1)
HGB BLD-MCNC: 10.7 G/DL — LOW (ref 11.5–15.5)
IMM GRANULOCYTES NFR BLD AUTO: 0.9 % — SIGNIFICANT CHANGE UP (ref 0–0.9)
KETONES UR-MCNC: NEGATIVE MG/DL — SIGNIFICANT CHANGE UP
LACTATE BLDV-MCNC: 1.6 MMOL/L — SIGNIFICANT CHANGE UP (ref 0.5–2)
LEUKOCYTE ESTERASE UR-ACNC: NEGATIVE — SIGNIFICANT CHANGE UP
LIDOCAIN IGE QN: 32 U/L — SIGNIFICANT CHANGE UP (ref 22–51)
LYMPHOCYTES # BLD AUTO: 0.97 K/UL — LOW (ref 1–3.3)
LYMPHOCYTES # BLD AUTO: 13.1 % — SIGNIFICANT CHANGE UP (ref 13–44)
MCHC RBC-ENTMCNC: 27.2 PG — SIGNIFICANT CHANGE UP (ref 27–34)
MCHC RBC-ENTMCNC: 35.3 GM/DL — SIGNIFICANT CHANGE UP (ref 32–36)
MCV RBC AUTO: 77.1 FL — LOW (ref 80–100)
MONOCYTES # BLD AUTO: 0.79 K/UL — SIGNIFICANT CHANGE UP (ref 0–0.9)
MONOCYTES NFR BLD AUTO: 10.7 % — SIGNIFICANT CHANGE UP (ref 2–14)
NEUTROPHILS # BLD AUTO: 5.4 K/UL — SIGNIFICANT CHANGE UP (ref 1.8–7.4)
NEUTROPHILS NFR BLD AUTO: 72.9 % — SIGNIFICANT CHANGE UP (ref 43–77)
NITRITE UR-MCNC: NEGATIVE — SIGNIFICANT CHANGE UP
PCO2 BLDV: 38 MMHG — LOW (ref 39–42)
PH BLDV: 7.39 — SIGNIFICANT CHANGE UP (ref 7.32–7.43)
PH UR: 6.5 — SIGNIFICANT CHANGE UP (ref 5–8)
PLATELET # BLD AUTO: 318 K/UL — SIGNIFICANT CHANGE UP (ref 150–400)
PO2 BLDV: 106 MMHG — HIGH (ref 25–45)
POTASSIUM BLDV-SCNC: 4.3 MMOL/L — SIGNIFICANT CHANGE UP (ref 3.5–5.1)
POTASSIUM SERPL-MCNC: 4.6 MMOL/L — SIGNIFICANT CHANGE UP (ref 3.5–5.3)
POTASSIUM SERPL-SCNC: 4.6 MMOL/L — SIGNIFICANT CHANGE UP (ref 3.5–5.3)
PROT SERPL-MCNC: 5.5 G/DL — LOW (ref 6.6–8.7)
PROT UR-MCNC: NEGATIVE MG/DL — SIGNIFICANT CHANGE UP
RBC # BLD: 3.93 M/UL — SIGNIFICANT CHANGE UP (ref 3.8–5.2)
RBC # FLD: 13.2 % — SIGNIFICANT CHANGE UP (ref 10.3–14.5)
SAO2 % BLDV: 99.6 % — SIGNIFICANT CHANGE UP
SODIUM SERPL-SCNC: 124 MMOL/L — LOW (ref 135–145)
SP GR SPEC: 1.02 — SIGNIFICANT CHANGE UP (ref 1–1.03)
UROBILINOGEN FLD QL: 1 MG/DL — SIGNIFICANT CHANGE UP (ref 0.2–1)
WBC # BLD: 7.41 K/UL — SIGNIFICANT CHANGE UP (ref 3.8–10.5)
WBC # FLD AUTO: 7.41 K/UL — SIGNIFICANT CHANGE UP (ref 3.8–10.5)

## 2024-03-17 PROCEDURE — 99291 CRITICAL CARE FIRST HOUR: CPT | Mod: GC

## 2024-03-17 RX ORDER — FAMOTIDINE 10 MG/ML
20 INJECTION INTRAVENOUS ONCE
Refills: 0 | Status: COMPLETED | OUTPATIENT
Start: 2024-03-17 | End: 2024-03-17

## 2024-03-17 RX ORDER — SODIUM CHLORIDE 9 MG/ML
500 INJECTION INTRAMUSCULAR; INTRAVENOUS; SUBCUTANEOUS ONCE
Refills: 0 | Status: COMPLETED | OUTPATIENT
Start: 2024-03-17 | End: 2024-03-17

## 2024-03-17 RX ORDER — ACETAMINOPHEN 500 MG
975 TABLET ORAL ONCE
Refills: 0 | Status: COMPLETED | OUTPATIENT
Start: 2024-03-17 | End: 2024-03-17

## 2024-03-17 RX ORDER — SODIUM CHLORIDE 9 MG/ML
1000 INJECTION INTRAMUSCULAR; INTRAVENOUS; SUBCUTANEOUS ONCE
Refills: 0 | Status: COMPLETED | OUTPATIENT
Start: 2024-03-17 | End: 2024-03-17

## 2024-03-17 RX ADMIN — Medication 975 MILLIGRAM(S): at 22:36

## 2024-03-17 RX ADMIN — SODIUM CHLORIDE 500 MILLILITER(S): 9 INJECTION INTRAMUSCULAR; INTRAVENOUS; SUBCUTANEOUS at 23:22

## 2024-03-17 RX ADMIN — SODIUM CHLORIDE 1000 MILLILITER(S): 9 INJECTION INTRAMUSCULAR; INTRAVENOUS; SUBCUTANEOUS at 22:36

## 2024-03-17 RX ADMIN — FAMOTIDINE 20 MILLIGRAM(S): 10 INJECTION INTRAVENOUS at 22:36

## 2024-03-17 NOTE — ED ADULT NURSE NOTE - NSFALLLASTSIX_ED_ALL_ED
Detail Level: Detailed
Hide Include Location In Plan Question?: No
Include Location In Plan?: Yes
No.

## 2024-03-17 NOTE — ED PROVIDER NOTE - ATTENDING CONTRIBUTION TO CARE
73F with PMHx of T2DM, HTN, HLD, hypothyroidism presenting due to vomiting, diarrhea, no appetite, body aches, and elevated blood sugar that began last night after she started repaglinide for the first time yesterday. Blood sugar was 393 at home and patient reports metallic taste. Was recently diagnosed with UTI and is currently taking Bactrim. Follows with Dr. Alegre.    I, Hailey Guo, personally saw the patient with the resident, and completed the key components of the history and physical exam. I then discussed the management plan with the resident.    Due to the a high probability of clinically significant, life threatening deterioration, the patient required high level of preparedness to intervene emergently. I personally spent critical care time directly and personally managing the patient. This included (but not limited too reviewing  vitals, managing orders, and determining and adjusting plan depending on response to interventions.

## 2024-03-17 NOTE — ED PROVIDER NOTE - PROGRESS NOTE DETAILS
Repeat BMP after 1.5L NS revealed Na of 123  Serum osm of 270, urine osm of 282, anion gap of 11  Admit to medicine due to symptomatic hyponatremia, likely 2/2 diarrhea

## 2024-03-17 NOTE — ED ADULT NURSE NOTE - OBJECTIVE STATEMENT
c/o weakness, decreased po intake, chills, increased blood sugars, neck pain, nausea no vomiting, headahce, and chronic dizziness "when I look down". took 500mg Tylenol this morning. as per daughter yesterday, pt's endocrinologist increased pt's losartan from 35mg to 50 mg, added repaglinide 0.5mg, and discontinued jaument -1000mg. hx hypothermia in december 2024. as per daughter @ bedside, "I thought she was experiencing the same ting so we came back". c/o weakness, decreased po intake, chills, increased blood sugars, neck pain, nausea no vomiting, headahce, and chronic dizziness "when I look down". took 500mg Tylenol this morning. as per daughter yesterday, pt's endocrinologist increased pt's losartan from 25mg to 50 mg, added repaglinide 0.5mg, and discontinued jaument -1000mg. hx hyponatremia in december 2024. as per daughter @ bedside, "I thought she was experiencing the same ting so we came back". c/o weakness, decreased po intake, chills, increased blood sugars, neck pain, nausea no vomiting, headahce, and chronic dizziness "when I look down". took 500mg Tylenol this morning. as per daughter yesterday, pt's endocrinologist increased pt's losartan from 25mg to 50 mg, added repaglinide 0.5mg, and discontinued jaument -1000mg. hx hyponatremia in december 2023. as per daughter @ bedside, "I thought she was experiencing the same ting so we came back".

## 2024-03-17 NOTE — ED PROVIDER NOTE - NSICDXPASTMEDICALHX_GEN_ALL_CORE_FT
PAST MEDICAL HISTORY:  Benign essential HTN     HLD (hyperlipidemia)     Primary hypothyroidism     Type 2 diabetes mellitus

## 2024-03-17 NOTE — ED ADULT TRIAGE NOTE - CHIEF COMPLAINT QUOTE
c/o diarrhea, body aches and high blood sugars since yesterday. daughter reports pt started new medication for diabetes Repaglinide  last night. denies abd pain or fevers.

## 2024-03-17 NOTE — ED ADULT TRIAGE NOTE - HEART RATE (BEATS/MIN)
-c/w atorvastatin 80mg 89 Pt with elevated Ck to 924 on admission.  -likely 2/2 to uncontrolled rigors for >24h  -trending downwards

## 2024-03-17 NOTE — ED PROVIDER NOTE - ABDOMINAL EXAM
CHIEF COMPLAINT:  Kajal May is here today for Subsequent Annual Medicare Wellness Visit.      Medication verified and med list updated  Denies Latex allergy or symptoms of Latex sensitivity.    Refills needed today?  Yes         Patient would like communication of their results via:   Home Phone: [phone] (home)  Okay to leave a message containing results? Yes     Cholesterol (mg/dL)   Date Value   12/23/2020 230 (H)     HDL (mg/dL)   Date Value   12/23/2020 57     No components found for: CHOLHDLRATIO  Triglycerides (mg/dL)   Date Value   12/23/2020 97     LDL (mg/dL)   Date Value   12/23/2020 154 (H)      Glucose (mg/dL)   Date Value   07/13/2021 87       Health Maintenance Due   Topic Date Due   • Colonoscopy Risk  04/24/2020   • Breast Cancer Screening  08/21/2021   • Medicare Wellness Visit  12/04/2021   • Lung Cancer Screening  12/09/2021   • Cervical Cancer Screen 30-64 -  01/18/2022       1.) Do you have an Advance directive, living will, or power of  for health care document that contains your wishes for end of life care?: No     3.) During the past 4 weeks, how would you rate your health?: Fair     4.) During the past 4 weeks, what was the hardest physical activity you could do for at least 2 minutes?: Light     6 d.) How many servings of Sugar Sweetened Beverages do you have each day ( 1 serving = 1 can or 12 oz cup of sode or juice): 3 per day     7a.) Have you had a fall in the past year?: Yes     7b.) Do you feel unsteady when standing or walking?: Yes     7c.) Do you worry about falling?: Yes     8.) During the past 4 weeks, has your physical and emotional health limited your social activities with family, friends, neighbors, or other groups?: Quite a bit     11a.) Bladder Control problems (urine leaking): Always     11b.) Bowel control problems: Always     11c.) Teeth or Denture Problems: Often     11d.) Bodily pain: Often     11e.) Tiredness or Fatigue: Often     11f.) Feeling  stressed or overwhelmed: Often     12.) Do you need help with any of the following activities?   (check all that apply): Bathing, Dressing and grooming     13.) Do you need help with any of the following activities?: Do your housework or laundry, Handle your own money     15.) How confident are you that you can control and manage most of your health problems?: Somewhat confident           Recent Review Flowsheet Data     Date 12/17/2021    Adult PHQ 2 Score 6    Adult PHQ 2 Interpretation Further screening needed    Little interest or pleasure in activity? 3    Feeling down, depressed or hopeless? 3    Adult PHQ 9 Score 16    Adult PHQ 9 Interpretation Moderately Severe Depression    Trouble falling or staying asleep or sleeping all the time? 3    Feeling tired or having little energy? 3    Poor appetite or overeating? 0    Feeling bad about yourself or that you are a failure or have let yourself or family down? 2    Trouble concentrating on things such as reading the newspaper or watching TV? 1    Moving or speaking slowly that other people have noticed or the opposite - being so fidgety or restless that you have been moving around a lot more than usual? 1    Thoughts that you would be better off dead or of hurting yourself in some way? 0    If you reported any problems, how difficult have these problems made it to do your work, take care of things at home, or get along with other people? Very difficult           soft/tender.../nondistended

## 2024-03-17 NOTE — ED PROVIDER NOTE - OBJECTIVE STATEMENT
73F with PMHx of T2DM, HTN, HLD, hypothyroidism presenting due to vomiting, diarrhea, no appetite, body aches, and elevated blood sugar that began last night after she started repaglinide for the first time yesterday. Blood sugar was 393 at home and patient reports metallic taste. Was recently diagnosed with UTI and is currently taking Bactrim. Follows with Dr. Alegre.

## 2024-03-17 NOTE — ED ADULT NURSE NOTE - NSFALLUNIVINTERV_ED_ALL_ED
Bed/Stretcher in lowest position, wheels locked, appropriate side rails in place/Call bell, personal items and telephone in reach/Instruct patient to call for assistance before getting out of bed/chair/stretcher/Non-slip footwear applied when patient is off stretcher/[place] to call system/Physically safe environment - no spills, clutter or unnecessary equipment/Purposeful proactive rounding/Room/bathroom lighting operational, light cord in reach

## 2024-03-17 NOTE — ED PROVIDER NOTE - CLINICAL SUMMARY MEDICAL DECISION MAKING FREE TEXT BOX
Assessment: 73F with PMHx of T2DM, HTN, HLD, hypothyroidism presenting due to vomiting, diarrhea, elevated blood glucose, metallic taste that began yesterday after starting Repaglinide yesterday. Etiology likely DKA vs viral gastroenteritis vs medication intolerance.     Plan:  - IV fluids  - Acetaminophen 975 mg x1  - Pepcid 20 mg IV x1  - VBG w/ lytes  - CBC, CMP  - Lipase  - Blood glucose testing  - UA

## 2024-03-17 NOTE — ED PROVIDER NOTE - CARE PLAN
30 yo F with pmhx of migraines presents with RT sided frontal headache that is similar to her past migraines. Neuro intact. Afebrile. Pain mgmt. IVF. Neuro follow up. Will reassess. 1 Principal Discharge DX:	Acute hyponatremia  Secondary Diagnosis:	General weakness

## 2024-03-18 DIAGNOSIS — E87.1 HYPO-OSMOLALITY AND HYPONATREMIA: ICD-10-CM

## 2024-03-18 LAB
ANION GAP SERPL CALC-SCNC: 11 MMOL/L — SIGNIFICANT CHANGE UP (ref 5–17)
BUN SERPL-MCNC: 13.2 MG/DL — SIGNIFICANT CHANGE UP (ref 8–20)
BUN SERPL-MCNC: 14 MG/DL — SIGNIFICANT CHANGE UP (ref 8–20)
BUN SERPL-MCNC: 17.6 MG/DL — SIGNIFICANT CHANGE UP (ref 8–20)
CALCIUM SERPL-MCNC: 7.4 MG/DL — LOW (ref 8.4–10.5)
CALCIUM SERPL-MCNC: 8 MG/DL — LOW (ref 8.4–10.5)
CALCIUM SERPL-MCNC: 8 MG/DL — LOW (ref 8.4–10.5)
CHLORIDE SERPL-SCNC: 93 MMOL/L — LOW (ref 96–108)
CHLORIDE SERPL-SCNC: 98 MMOL/L — SIGNIFICANT CHANGE UP (ref 96–108)
CHLORIDE SERPL-SCNC: 98 MMOL/L — SIGNIFICANT CHANGE UP (ref 96–108)
CO2 SERPL-SCNC: 19 MMOL/L — LOW (ref 22–29)
CO2 SERPL-SCNC: 19 MMOL/L — LOW (ref 22–29)
CO2 SERPL-SCNC: 20 MMOL/L — LOW (ref 22–29)
CREAT SERPL-MCNC: 0.55 MG/DL — SIGNIFICANT CHANGE UP (ref 0.5–1.3)
CREAT SERPL-MCNC: 0.56 MG/DL — SIGNIFICANT CHANGE UP (ref 0.5–1.3)
CREAT SERPL-MCNC: 0.62 MG/DL — SIGNIFICANT CHANGE UP (ref 0.5–1.3)
EGFR: 93 ML/MIN/1.73M2 — SIGNIFICANT CHANGE UP
EGFR: 95 ML/MIN/1.73M2 — SIGNIFICANT CHANGE UP
EGFR: 96 ML/MIN/1.73M2 — SIGNIFICANT CHANGE UP
GLUCOSE BLDC GLUCOMTR-MCNC: 186 MG/DL — HIGH (ref 70–99)
GLUCOSE BLDC GLUCOMTR-MCNC: 296 MG/DL — HIGH (ref 70–99)
GLUCOSE BLDC GLUCOMTR-MCNC: 336 MG/DL — HIGH (ref 70–99)
GLUCOSE BLDC GLUCOMTR-MCNC: 99 MG/DL — SIGNIFICANT CHANGE UP (ref 70–99)
GLUCOSE SERPL-MCNC: 207 MG/DL — HIGH (ref 70–99)
GLUCOSE SERPL-MCNC: 216 MG/DL — HIGH (ref 70–99)
GLUCOSE SERPL-MCNC: 304 MG/DL — HIGH (ref 70–99)
OSMOLALITY SERPL: 270 MOSMOL/KG — LOW (ref 280–301)
OSMOLALITY UR: 282 MOSM/KG — LOW (ref 300–1000)
POTASSIUM SERPL-MCNC: 4 MMOL/L — SIGNIFICANT CHANGE UP (ref 3.5–5.3)
POTASSIUM SERPL-MCNC: 4.3 MMOL/L — SIGNIFICANT CHANGE UP (ref 3.5–5.3)
POTASSIUM SERPL-MCNC: 4.6 MMOL/L — SIGNIFICANT CHANGE UP (ref 3.5–5.3)
POTASSIUM SERPL-SCNC: 4 MMOL/L — SIGNIFICANT CHANGE UP (ref 3.5–5.3)
POTASSIUM SERPL-SCNC: 4.3 MMOL/L — SIGNIFICANT CHANGE UP (ref 3.5–5.3)
POTASSIUM SERPL-SCNC: 4.6 MMOL/L — SIGNIFICANT CHANGE UP (ref 3.5–5.3)
SODIUM SERPL-SCNC: 123 MMOL/L — LOW (ref 135–145)
SODIUM SERPL-SCNC: 128 MMOL/L — LOW (ref 135–145)
SODIUM SERPL-SCNC: 128 MMOL/L — LOW (ref 135–145)

## 2024-03-18 PROCEDURE — 99222 1ST HOSP IP/OBS MODERATE 55: CPT

## 2024-03-18 PROCEDURE — 99223 1ST HOSP IP/OBS HIGH 75: CPT

## 2024-03-18 RX ORDER — GLUCAGON INJECTION, SOLUTION 0.5 MG/.1ML
1 INJECTION, SOLUTION SUBCUTANEOUS ONCE
Refills: 0 | Status: DISCONTINUED | OUTPATIENT
Start: 2024-03-18 | End: 2024-03-19

## 2024-03-18 RX ORDER — DEXTROSE 50 % IN WATER 50 %
25 SYRINGE (ML) INTRAVENOUS ONCE
Refills: 0 | Status: DISCONTINUED | OUTPATIENT
Start: 2024-03-18 | End: 2024-03-19

## 2024-03-18 RX ORDER — SODIUM CHLORIDE 5 G/100ML
1000 INJECTION, SOLUTION INTRAVENOUS
Refills: 0 | Status: DISCONTINUED | OUTPATIENT
Start: 2024-03-18 | End: 2024-03-18

## 2024-03-18 RX ORDER — SODIUM CHLORIDE 9 MG/ML
1000 INJECTION INTRAMUSCULAR; INTRAVENOUS; SUBCUTANEOUS
Refills: 0 | Status: DISCONTINUED | OUTPATIENT
Start: 2024-03-18 | End: 2024-03-18

## 2024-03-18 RX ORDER — ACETAMINOPHEN 500 MG
650 TABLET ORAL EVERY 6 HOURS
Refills: 0 | Status: DISCONTINUED | OUTPATIENT
Start: 2024-03-18 | End: 2024-03-19

## 2024-03-18 RX ORDER — INSULIN LISPRO 100/ML
2 VIAL (ML) SUBCUTANEOUS
Refills: 0 | Status: DISCONTINUED | OUTPATIENT
Start: 2024-03-18 | End: 2024-03-19

## 2024-03-18 RX ORDER — INSULIN LISPRO 100/ML
VIAL (ML) SUBCUTANEOUS
Refills: 0 | Status: DISCONTINUED | OUTPATIENT
Start: 2024-03-18 | End: 2024-03-19

## 2024-03-18 RX ORDER — CALCIUM GLUCONATE 100 MG/ML
2 VIAL (ML) INTRAVENOUS ONCE
Refills: 0 | Status: COMPLETED | OUTPATIENT
Start: 2024-03-18 | End: 2024-03-18

## 2024-03-18 RX ORDER — ONDANSETRON 8 MG/1
4 TABLET, FILM COATED ORAL EVERY 8 HOURS
Refills: 0 | Status: DISCONTINUED | OUTPATIENT
Start: 2024-03-18 | End: 2024-03-19

## 2024-03-18 RX ORDER — SODIUM CHLORIDE 9 MG/ML
1000 INJECTION, SOLUTION INTRAVENOUS
Refills: 0 | Status: DISCONTINUED | OUTPATIENT
Start: 2024-03-18 | End: 2024-03-19

## 2024-03-18 RX ORDER — HEPARIN SODIUM 5000 [USP'U]/ML
5000 INJECTION INTRAVENOUS; SUBCUTANEOUS EVERY 12 HOURS
Refills: 0 | Status: DISCONTINUED | OUTPATIENT
Start: 2024-03-18 | End: 2024-03-19

## 2024-03-18 RX ORDER — DEXTROSE 50 % IN WATER 50 %
15 SYRINGE (ML) INTRAVENOUS ONCE
Refills: 0 | Status: DISCONTINUED | OUTPATIENT
Start: 2024-03-18 | End: 2024-03-19

## 2024-03-18 RX ORDER — INSULIN GLARGINE 100 [IU]/ML
5 INJECTION, SOLUTION SUBCUTANEOUS AT BEDTIME
Refills: 0 | Status: DISCONTINUED | OUTPATIENT
Start: 2024-03-18 | End: 2024-03-19

## 2024-03-18 RX ORDER — LEVOTHYROXINE SODIUM 125 MCG
2 TABLET ORAL
Refills: 0 | DISCHARGE

## 2024-03-18 RX ORDER — AMLODIPINE BESYLATE 2.5 MG/1
10 TABLET ORAL DAILY
Refills: 0 | Status: DISCONTINUED | OUTPATIENT
Start: 2024-03-18 | End: 2024-03-19

## 2024-03-18 RX ORDER — INFLUENZA VIRUS VACCINE 15; 15; 15; 15 UG/.5ML; UG/.5ML; UG/.5ML; UG/.5ML
0.7 SUSPENSION INTRAMUSCULAR ONCE
Refills: 0 | Status: COMPLETED | OUTPATIENT
Start: 2024-03-18 | End: 2024-03-18

## 2024-03-18 RX ORDER — LEVOTHYROXINE SODIUM 125 MCG
100 TABLET ORAL DAILY
Refills: 0 | Status: DISCONTINUED | OUTPATIENT
Start: 2024-03-18 | End: 2024-03-19

## 2024-03-18 RX ORDER — SIMVASTATIN 20 MG/1
10 TABLET, FILM COATED ORAL AT BEDTIME
Refills: 0 | Status: DISCONTINUED | OUTPATIENT
Start: 2024-03-18 | End: 2024-03-19

## 2024-03-18 RX ORDER — TRAZODONE HCL 50 MG
0 TABLET ORAL
Refills: 0 | DISCHARGE

## 2024-03-18 RX ORDER — LANOLIN ALCOHOL/MO/W.PET/CERES
3 CREAM (GRAM) TOPICAL AT BEDTIME
Refills: 0 | Status: DISCONTINUED | OUTPATIENT
Start: 2024-03-18 | End: 2024-03-19

## 2024-03-18 RX ORDER — LEVOTHYROXINE SODIUM 125 MCG
1 TABLET ORAL
Refills: 0 | DISCHARGE

## 2024-03-18 RX ADMIN — ONDANSETRON 4 MILLIGRAM(S): 8 TABLET, FILM COATED ORAL at 03:52

## 2024-03-18 RX ADMIN — Medication 650 MILLIGRAM(S): at 20:09

## 2024-03-18 RX ADMIN — INSULIN GLARGINE 5 UNIT(S): 100 INJECTION, SOLUTION SUBCUTANEOUS at 21:37

## 2024-03-18 RX ADMIN — Medication 650 MILLIGRAM(S): at 12:51

## 2024-03-18 RX ADMIN — Medication 650 MILLIGRAM(S): at 11:58

## 2024-03-18 RX ADMIN — Medication 1 TABLET(S): at 17:30

## 2024-03-18 RX ADMIN — Medication 8: at 21:38

## 2024-03-18 RX ADMIN — AMLODIPINE BESYLATE 10 MILLIGRAM(S): 2.5 TABLET ORAL at 05:36

## 2024-03-18 RX ADMIN — Medication 650 MILLIGRAM(S): at 21:09

## 2024-03-18 RX ADMIN — Medication 2 UNIT(S): at 12:32

## 2024-03-18 RX ADMIN — SIMVASTATIN 10 MILLIGRAM(S): 20 TABLET, FILM COATED ORAL at 21:37

## 2024-03-18 RX ADMIN — Medication 2 UNIT(S): at 16:24

## 2024-03-18 RX ADMIN — Medication 1 TABLET(S): at 05:36

## 2024-03-18 RX ADMIN — Medication 6: at 09:22

## 2024-03-18 RX ADMIN — Medication 2: at 16:28

## 2024-03-18 RX ADMIN — SODIUM CHLORIDE 50 MILLILITER(S): 5 INJECTION, SOLUTION INTRAVENOUS at 04:40

## 2024-03-18 RX ADMIN — HEPARIN SODIUM 5000 UNIT(S): 5000 INJECTION INTRAVENOUS; SUBCUTANEOUS at 17:29

## 2024-03-18 RX ADMIN — Medication 200 GRAM(S): at 03:33

## 2024-03-18 RX ADMIN — Medication 100 MICROGRAM(S): at 05:36

## 2024-03-18 RX ADMIN — Medication 2 UNIT(S): at 09:22

## 2024-03-18 RX ADMIN — HEPARIN SODIUM 5000 UNIT(S): 5000 INJECTION INTRAVENOUS; SUBCUTANEOUS at 07:27

## 2024-03-18 NOTE — PATIENT PROFILE ADULT - FALL HARM RISK - HARM RISK INTERVENTIONS

## 2024-03-18 NOTE — H&P ADULT - ASSESSMENT
72 y/o female with symptomatic hyponatremia, Nausea/Vomitting/Diarrhea, Hx of DM-2, Hypothyroidism, HTN, HLD, UTI    Symptomatic Hyponatremia:  -Multi-factorial from poor solute intake with reported tea/toast diet, ARB use, osmotic diuretic effect of hyperglycemia, and N/V/D over last 2 days  -s/p 1.5 liters of NS in ED with no improvement on repeat  -Appears euvolemic on exam, normotensive, not orthostatic   -Serum osm low/Urine osm low, UA with elevated SG  -Place on regular salt diet  -Start on 2% NS at 50ml/hr with serial BMP Q 4 hours with goal rate 0.5-1meq per hour not to exceed 6-8meq in 24 hours  -Hold ARB  -Nephrology eval  -Check TSH, however was normal on last admission   -Fall/seizure precautions  -Low threshold for MICU   -OOB, ambulate, DVT-P w/ sub Q heparin   -PT program    N/V/D:  -Gastroenteritis from sick contacts at home?  -No episodes in ED, abdomen benign  -Resume diet, prn Zofran    DM-2:  -Hold oral meds  -ADA diet  -Basal/bolus regimen while admitted  -F/U with Endocrine as o/p    Hypothyroidism:  -Resume Synthroid  -Check TSH with hyponatremia, was normal in 12/23    HTN:  -Hold ARB as above  -Cont. Norvasc  -Regular sodium diet for now with hyponatremia     HLD:  -Statin     UTI:  -Symptoms resolved  -Has 2 more days of Bactrim per Daughter     Discussed with Daughter Bárbara at bedside who agrees with above plan of care

## 2024-03-18 NOTE — CHART NOTE - NSCHARTNOTEFT_GEN_A_CORE
Medicine attending chart note:    74 y/o female with symptomatic hyponatremia, Nausea/Vomitting/Diarrhea, Hx of DM-2, Hypothyroidism, HTN, HLD, UTI. [place]ian Speaking though understands english intermittently. Used son as  at bedside. Seen by nephrology and recommended NS. Na at 128 now. Goal serum sodium of closer to 130 mmol/L till tomorrow morning. Seen and examined at bedside. C/w rest of care as per H/P

## 2024-03-18 NOTE — ED ADULT NURSE REASSESSMENT NOTE - NS ED NURSE REASSESS COMMENT FT1
rounds frequently provided for pt comfort and safety. no acute distress noted. pt expresses no other needs at this time. no further concerns as of present. call bell within pt admitted to [facility]. NSR on telemetry. MD HICKS @ bedside Followed protocol

## 2024-03-18 NOTE — CONSULT NOTE ADULT - ASSESSMENT
73 YOF w/ DM 2, HTN, hypothyroidism admitted with generalized weakness, vomiting, diarrhea found to have hyponatremia    Hyponatremia, multifactorial  Vomiting and diarrhea  DM2, uncontrolled  Hypothyroidism  Hypertension    ·	Hyponatremia multifactorial in setting of poor p.o. intake, osmotic diuresis in setting of hyperglycemia and hypovolemia in setting of vomiting diarrhea  ·	Serum osmolality 270.  Will check urine sodium, urine osmolality.  ·	Check TSH  ·	Place on normal saline at 100 cc/h.  ·	Add p.o. supplements Ensure, boost, encourage p.o. intake  ·	Free water restriction of less than 1.2 L/day  ·	Blood sugar control, ACHS sliding scale  ·	Management of diarrhea and vomiting per primary team  ·	Goal serum sodium of closer to 130 mmol/L till tomorrow morning.

## 2024-03-18 NOTE — PHYSICAL THERAPY INITIAL EVALUATION ADULT - ADDITIONAL COMMENTS
PT AxOX4 states she lives at home with daughter with "a few steps" to enter and 12 steps upstairs with 2HR. Pt does not own or use DME.

## 2024-03-18 NOTE — CONSULT NOTE ADULT - SUBJECTIVE AND OBJECTIVE BOX
History of present illness: 73-year-old female with past medical history significant for hypothyroidism, DM 2, hypertension, hyperlipidemia presented with generalized weakness, vomiting, diarrhea.  Labs significant for hyponatremia with serum sodium of 124 mmol/L.  History of poor appetite.  Denies fever, chills, dysuria, gross hematuria.  No focal weakness, dizziness, tingling, numbness.    Review of systems: All systems were reviewed in detail, pertinent positive and negative have been mentioned above, otherwise negative.  Past medical and surgical history: Hypertension, DM 2, hypothyroidism, hyperlipidemia, no significant past surgical history.  Allergies: NKDA  Family history: No pertinent family history of renal disease or electrolyte disorder in first degree relatives.  Social history: Denies tobacco, alcohol, drug abuse.     Home Medications:  alendronate 70 mg oral tablet: 1 tab(s) orally every 7 days (18 Mar 2024 04:52)  Bactrim  mg-160 mg oral tablet: 1 tab(s) orally 2 times a day (18 Mar 2024 04:52)  losartan 50 mg oral tablet: 1 tab(s) orally once a day (18 Mar 2024 04:52)  Norvasc 10 mg oral tablet: 1 tab(s) orally once a day (18 Mar 2024 04:52)  Prandin 0.5 mg oral tablet: 1 tab(s) orally 2 times a day (18 Mar 2024 04:52)  simvastatin 10 mg oral tablet: 1 tab(s) orally once a day (at bedtime) (18 Mar 2024 04:52)  Synthroid 100 mcg (0.1 mg) oral tablet: 1 tab(s) orally once a day (18 Mar 2024 04:52)    Vital Signs Last 24 Hrs  T(C): 36.4 (18 Mar 2024 07:40), Max: 37.1 (17 Mar 2024 21:14)  T(F): 97.6 (18 Mar 2024 07:40), Max: 98.7 (17 Mar 2024 21:14)  HR: 77 (18 Mar 2024 07:40) (66 - 89)  BP: 144/66 (18 Mar 2024 07:40) (131/74 - 153/71)  BP(mean): 93 (18 Mar 2024 04:38) (93 - 93)  RR: 18 (18 Mar 2024 07:40) (18 - 18)  SpO2: 95% (18 Mar 2024 07:40) (95% - 99%)  Parameters below as of 18 Mar 2024 07:40  Patient On (Oxygen Delivery Method): room air    Physical Exam:  Gen: no acute distress  MS: alert, conversing normally  Eyes: EOMI, no icterus  HENT: NCAT, MMM  CV: rhythm reg reg, rate normal, no m/g/r, no LE edema  Chest: CTAB, no w/r/r,  Abd: soft, NT, ND  Neuro: moving all 4 limbs spontaneously, no tremor  MSK: normal bulk and tone, no joint swelling  Skin: dry, warm, no rash or jaundice    03-18    123<L>  |  93<L>  |  17.6  ----------------------------<  216<H>  4.0   |  19.0<L>  |  0.62    Ca    7.4<L>      18 Mar 2024 00:06    TPro  5.5<L>  /  Alb  3.5  /  TBili  <0.2<L>  /  DBili  x   /  AST  12  /  ALT  9   /  AlkPhos  59  03-17                        10.7   7.41  )-----------( 318      ( 17 Mar 2024 22:20 )             30.3   Imaging: Reviewed

## 2024-03-18 NOTE — H&P ADULT - HISTORY OF PRESENT ILLNESS
72 y/o female with hx of Hypothyroidism, DM-2, HTN, HLD, was brought in by her Daughter for generalized weakness over the past week, and 2 episodes of NBNB vomiting and loose stool without evidence of blood. Patient Daughter reports her kids are sick with similar symptoms and she feels her Mother caught what they have. No reported URI symptoms, no fever, chills, CP, SOB. She states she also recently saw her Endocrinologist on 3/14 and Prandin was added to her regimen and Janumet was dcd due to GI side effect? Also had Losartan increased to 50mg from 25. She admits her Mother only checks her FS in am and they are in the mid 200s, but last night it was 380. She states he Mother eats very poorly and mostly drinks tea and eats toast all day long with some boiled vegetables. In the ED vitals stable. Patient in no distress, but c/o feeling overall weak. No N/V/D in ED. Labs with Na of 124 from 135 on DC in 12/19/23. Glucose 243, normal AG. Serum/Urine Osm low. She was given 1.5 liters of NS with repeat serum Na now at 123. Patient Daughter also reports she has 2 days of Bactrim left for a recently diagnosed UTI on 3/11/24. Being admitted for symptomatic hyponatremia.

## 2024-03-18 NOTE — PHYSICAL THERAPY INITIAL EVALUATION ADULT - PERTINENT HX OF CURRENT PROBLEM, REHAB EVAL
History of present illness: 73-year-old female with past medical history significant for hypothyroidism, DM 2, hypertension, hyperlipidemia presented with generalized weakness, vomiting, diarrhea.  Labs significant for hyponatremia with serum sodium of 124 mmol/L.  History of poor appetite.  Denies fever, chills, dysuria, gross hematuria.  No focal weakness, dizziness, tingling, numbness.

## 2024-03-19 ENCOUNTER — TRANSCRIPTION ENCOUNTER (OUTPATIENT)
Age: 74
End: 2024-03-19

## 2024-03-19 VITALS
TEMPERATURE: 98 F | SYSTOLIC BLOOD PRESSURE: 142 MMHG | DIASTOLIC BLOOD PRESSURE: 64 MMHG | HEART RATE: 78 BPM | OXYGEN SATURATION: 96 % | RESPIRATION RATE: 18 BRPM

## 2024-03-19 LAB
A1C WITH ESTIMATED AVERAGE GLUCOSE RESULT: 10.2 % — HIGH (ref 4–5.6)
ANION GAP SERPL CALC-SCNC: 9 MMOL/L — SIGNIFICANT CHANGE UP (ref 5–17)
BUN SERPL-MCNC: 14.1 MG/DL — SIGNIFICANT CHANGE UP (ref 8–20)
CALCIUM SERPL-MCNC: 8.1 MG/DL — LOW (ref 8.4–10.5)
CHLORIDE SERPL-SCNC: 97 MMOL/L — SIGNIFICANT CHANGE UP (ref 96–108)
CO2 SERPL-SCNC: 24 MMOL/L — SIGNIFICANT CHANGE UP (ref 22–29)
CREAT SERPL-MCNC: 0.64 MG/DL — SIGNIFICANT CHANGE UP (ref 0.5–1.3)
EGFR: 92 ML/MIN/1.73M2 — SIGNIFICANT CHANGE UP
ESTIMATED AVERAGE GLUCOSE: 246 MG/DL — HIGH (ref 68–114)
GLUCOSE BLDC GLUCOMTR-MCNC: 172 MG/DL — HIGH (ref 70–99)
GLUCOSE BLDC GLUCOMTR-MCNC: 234 MG/DL — HIGH (ref 70–99)
GLUCOSE SERPL-MCNC: 120 MG/DL — HIGH (ref 70–99)
HCT VFR BLD CALC: 31.4 % — LOW (ref 34.5–45)
HGB BLD-MCNC: 10.8 G/DL — LOW (ref 11.5–15.5)
MCHC RBC-ENTMCNC: 27.1 PG — SIGNIFICANT CHANGE UP (ref 27–34)
MCHC RBC-ENTMCNC: 34.4 GM/DL — SIGNIFICANT CHANGE UP (ref 32–36)
MCV RBC AUTO: 78.9 FL — LOW (ref 80–100)
PLATELET # BLD AUTO: 360 K/UL — SIGNIFICANT CHANGE UP (ref 150–400)
POTASSIUM SERPL-MCNC: 4.5 MMOL/L — SIGNIFICANT CHANGE UP (ref 3.5–5.3)
POTASSIUM SERPL-SCNC: 4.5 MMOL/L — SIGNIFICANT CHANGE UP (ref 3.5–5.3)
RBC # BLD: 3.98 M/UL — SIGNIFICANT CHANGE UP (ref 3.8–5.2)
RBC # FLD: 13.5 % — SIGNIFICANT CHANGE UP (ref 10.3–14.5)
SODIUM SERPL-SCNC: 130 MMOL/L — LOW (ref 135–145)
TSH SERPL-MCNC: 5.77 UIU/ML — HIGH (ref 0.27–4.2)
WBC # BLD: 6.78 K/UL — SIGNIFICANT CHANGE UP (ref 3.8–10.5)
WBC # FLD AUTO: 6.78 K/UL — SIGNIFICANT CHANGE UP (ref 3.8–10.5)

## 2024-03-19 PROCEDURE — 85014 HEMATOCRIT: CPT

## 2024-03-19 PROCEDURE — 82330 ASSAY OF CALCIUM: CPT

## 2024-03-19 PROCEDURE — 80048 BASIC METABOLIC PNL TOTAL CA: CPT

## 2024-03-19 PROCEDURE — 85027 COMPLETE CBC AUTOMATED: CPT

## 2024-03-19 PROCEDURE — 85025 COMPLETE CBC W/AUTO DIFF WBC: CPT

## 2024-03-19 PROCEDURE — 36415 COLL VENOUS BLD VENIPUNCTURE: CPT

## 2024-03-19 PROCEDURE — 82435 ASSAY OF BLOOD CHLORIDE: CPT

## 2024-03-19 PROCEDURE — 84295 ASSAY OF SERUM SODIUM: CPT

## 2024-03-19 PROCEDURE — 82947 ASSAY GLUCOSE BLOOD QUANT: CPT

## 2024-03-19 PROCEDURE — 83036 HEMOGLOBIN GLYCOSYLATED A1C: CPT

## 2024-03-19 PROCEDURE — 99285 EMERGENCY DEPT VISIT HI MDM: CPT | Mod: 25

## 2024-03-19 PROCEDURE — 84132 ASSAY OF SERUM POTASSIUM: CPT

## 2024-03-19 PROCEDURE — 90662 IIV NO PRSV INCREASED AG IM: CPT

## 2024-03-19 PROCEDURE — 99239 HOSP IP/OBS DSCHRG MGMT >30: CPT

## 2024-03-19 PROCEDURE — 82962 GLUCOSE BLOOD TEST: CPT

## 2024-03-19 PROCEDURE — 83930 ASSAY OF BLOOD OSMOLALITY: CPT

## 2024-03-19 PROCEDURE — 83935 ASSAY OF URINE OSMOLALITY: CPT

## 2024-03-19 PROCEDURE — 81003 URINALYSIS AUTO W/O SCOPE: CPT

## 2024-03-19 PROCEDURE — 83690 ASSAY OF LIPASE: CPT

## 2024-03-19 PROCEDURE — 84443 ASSAY THYROID STIM HORMONE: CPT

## 2024-03-19 PROCEDURE — 85018 HEMOGLOBIN: CPT

## 2024-03-19 PROCEDURE — 80053 COMPREHEN METABOLIC PANEL: CPT

## 2024-03-19 PROCEDURE — 96374 THER/PROPH/DIAG INJ IV PUSH: CPT

## 2024-03-19 PROCEDURE — 82803 BLOOD GASES ANY COMBINATION: CPT

## 2024-03-19 PROCEDURE — 83605 ASSAY OF LACTIC ACID: CPT

## 2024-03-19 RX ORDER — INSULIN GLARGINE 100 [IU]/ML
10 INJECTION, SOLUTION SUBCUTANEOUS AT BEDTIME
Refills: 0 | Status: DISCONTINUED | OUTPATIENT
Start: 2024-03-19 | End: 2024-03-19

## 2024-03-19 RX ADMIN — Medication 2 UNIT(S): at 07:52

## 2024-03-19 RX ADMIN — Medication 2 UNIT(S): at 11:41

## 2024-03-19 RX ADMIN — AMLODIPINE BESYLATE 10 MILLIGRAM(S): 2.5 TABLET ORAL at 05:40

## 2024-03-19 RX ADMIN — Medication 1 TABLET(S): at 05:40

## 2024-03-19 RX ADMIN — Medication 4: at 11:42

## 2024-03-19 RX ADMIN — Medication 100 MICROGRAM(S): at 05:40

## 2024-03-19 RX ADMIN — HEPARIN SODIUM 5000 UNIT(S): 5000 INJECTION INTRAVENOUS; SUBCUTANEOUS at 05:40

## 2024-03-19 RX ADMIN — Medication 2: at 07:52

## 2024-03-19 NOTE — DISCHARGE NOTE NURSING/CASE MANAGEMENT/SOCIAL WORK - PATIENT PORTAL LINK FT
You can access the FollowMyHealth Patient Portal offered by [facility] by registering at the following website: [email or web address]. By joining [facility]’s FollowMyHealth portal, you will also be able to view your health information using other applications (apps) compatible with our system.

## 2024-03-19 NOTE — DISCHARGE NOTE PROVIDER - ATTENDING DISCHARGE PHYSICAL EXAMINATION:
Patient/Caregiver provided printed discharge information. Gen : Non toxic appearing, comfortable, NAD  HEENT : NCAT, MMM, No cervical lymphadenopathy, no JVD  CVS : S1S2, regular rate and rhythm, no murmurs  Resp : CTA b/l, no rhonchi or wheezes appreciated   Abd : Soft, non distended, non tender, BS +ve   MSK : No joint swelling or tenderness, no digital clubbing, no distal cyanosis  Neuro : CN II-XII intact, no focal motor or sensory deficits   Psych : Pleasant, no anxiety, normal affect

## 2024-03-19 NOTE — DISCHARGE NOTE PROVIDER - CARE PROVIDER_API CALL
Blake Chance  Nephrology  [address] [place] [id number]  Phone: [phone]  Fax: [phone]  Follow Up Time:

## 2024-03-19 NOTE — DISCHARGE NOTE PROVIDER - NSDCMRMEDTOKEN_GEN_ALL_CORE_FT
alendronate 70 mg oral tablet: 1 tab(s) orally every 7 days  Bactrim  mg-160 mg oral tablet: 1 tab(s) orally 2 times a day  losartan 50 mg oral tablet: 1 tab(s) orally once a day  Norvasc 10 mg oral tablet: 1 tab(s) orally once a day  Prandin 0.5 mg oral tablet: 1 tab(s) orally 2 times a day  simvastatin 10 mg oral tablet: 1 tab(s) orally once a day (at bedtime)  Synthroid 100 mcg (0.1 mg) oral tablet: 1 tab(s) orally once a day  Vitamin B-12 1000 mcg oral tablet: 1 tab(s) orally once a day

## 2024-03-19 NOTE — DISCHARGE NOTE PROVIDER - HOSPITAL COURSE
72 y/o female with symptomatic hyponatremia, Nausea/Vomitting/Diarrhea, Hx of DM-2, Hypothyroidism, HTN, HLD, UTI. Hyponatremia multi-factorial from poor solute intake with reported tea/toast diet, ARB use, osmotic diuretic effect of hyperglycemia, and N/V/D over last 2 days. Given 1.5 liters of NS in ED with no improvement on repeat. Nephrology consulted. Appears euvolemic on exam, normotensive, not orthostatic. Serum osm low/Urine osm low, UA with elevated SG. Start on 2% NS at 50ml/hr with serial BMP Q 4 hours with goal rate 0.5-1meq per hour not to exceed 6-8meq in 24 hours. Hold ARB. Sodium correcting. HgA1c 10. TSH elevated. Synthroid resumed. N/V resolved. Evaluated by PT and recommend home, with no needs.              74 y/o female with symptomatic hyponatremia, Nausea/Vomitting/Diarrhea, Hx of DM-2, Hypothyroidism, HTN, HLD, UTI. Hyponatremia multi-factorial from poor solute intake with reported tea/toast diet, ARB use, osmotic diuretic effect of hyperglycemia, and N/V/D over last 2 days. Given 1.5 liters of NS in ED with no improvement on repeat. Nephrology consulted. Appears euvolemic on exam, normotensive, not orthostatic. Serum osm low/Urine osm low, UA with elevated SG. Start on 2% NS at 50ml/hr with serial BMP Q 4 hours with goal rate 0.5-1meq per hour not to exceed 6-8meq in 24 hours. Hold ARB. Sodium correcting. HgA1c 10. TSH elevated. Synthroid resumed. N/V resolved. Evaluated by PT and recommend home, with no needs.             72 y/o female with symptomatic hyponatremia, Nausea/Vomitting/Diarrhea, Hx of DM-2, Hypothyroidism, HTN, HLD, UTI. Hyponatremia multi-factorial from poor solute intake with reported tea/toast diet, ARB use, osmotic diuretic effect of hyperglycemia, and N/V/D over last 2 days. Given 1.5 liters of NS in ED with no improvement on repeat. Nephrology consulted. Appears euvolemic on exam, normotensive, not orthostatic. Serum osm low/Urine osm low, UA with elevated SG. Start on 2% NS at 50ml/hr with serial BMP Q 4 hours with goal rate 0.5-1meq per hour not to exceed 6-8meq in 24 hours. Hold ARB. Sodium correcting. HgA1c 10. TSH elevated. Synthroid resumed. N/V resolved. Sodium improved and her mentation returned to baseline. Evaluated by PT and recommend home, with no needs. She is medically stable for discharge.

## 2024-03-19 NOTE — DISCHARGE NOTE PROVIDER - NSDCCPCAREPLAN_GEN_ALL_CORE_FT
PRINCIPAL DISCHARGE DIAGNOSIS  Diagnosis: Acute hyponatremia  Assessment and Plan of Treatment:       SECONDARY DISCHARGE DIAGNOSES  Diagnosis: General weakness  Assessment and Plan of Treatment:     Diagnosis: Diabetes mellitus  Assessment and Plan of Treatment: Please follow a consitent carbohydrate diet and take medications as prescribed. Follow up with your Primary Care Provider or Endocrinologist    Diagnosis: Hypothyroidism  Assessment and Plan of Treatment: Please take medications as prescribed and follow up with a Primary Care Provider or Endocrinologist.     PRINCIPAL DISCHARGE DIAGNOSIS  Diagnosis: Acute hyponatremia  Assessment and Plan of Treatment: Seen by nephrology and treated with IVF.      SECONDARY DISCHARGE DIAGNOSES  Diagnosis: General weakness  Assessment and Plan of Treatment:     Diagnosis: Diabetes mellitus  Assessment and Plan of Treatment: Please follow a consitent carbohydrate diet and take medications as prescribed. Follow up with your Primary Care Provider or Endocrinologist    Diagnosis: Hypothyroidism  Assessment and Plan of Treatment: Please take medications as prescribed and follow up with a Primary Care Provider or Endocrinologist.     PRINCIPAL DISCHARGE DIAGNOSIS  Diagnosis: Acute hyponatremia  Assessment and Plan of Treatment: Seen by nephrology and treated with IVF. Follow up with nephrology outpatient.      SECONDARY DISCHARGE DIAGNOSES  Diagnosis: General weakness  Assessment and Plan of Treatment: Seen by PT and recommended home.    Diagnosis: Diabetes mellitus  Assessment and Plan of Treatment: Please follow a consitent carbohydrate diet and take medications as prescribed. Follow up with your Primary Care Provider or Endocrinologist    Diagnosis: Hypothyroidism  Assessment and Plan of Treatment: Please take medications as prescribed and follow up with a Primary Care Provider or Endocrinologist.

## 2024-03-21 ENCOUNTER — NON-APPOINTMENT (OUTPATIENT)
Age: 74
End: 2024-03-21

## 2024-03-21 ENCOUNTER — APPOINTMENT (OUTPATIENT)
Dept: UROGYNECOLOGY | Facility: CLINIC | Age: 74
End: 2024-03-21
Payer: MEDICARE

## 2024-03-21 ENCOUNTER — APPOINTMENT (OUTPATIENT)
Dept: BREAST CENTER | Facility: CLINIC | Age: 74
End: 2024-03-21

## 2024-03-21 LAB
BILIRUB UR QL STRIP: NORMAL
CLARITY UR: CLEAR
COLLECTION METHOD: NORMAL
GLUCOSE UR-MCNC: 500
HCG UR QL: 0.2 EU/DL
HGB UR QL STRIP.AUTO: NORMAL
KETONES UR-MCNC: NORMAL
LEUKOCYTE ESTERASE UR QL STRIP: NORMAL
NITRITE UR QL STRIP: NORMAL
PH UR STRIP: 6
PROT UR STRIP-MCNC: NORMAL
SP GR UR STRIP: 1.02

## 2024-03-21 PROCEDURE — 81003 URINALYSIS AUTO W/O SCOPE: CPT | Mod: QW

## 2024-03-21 PROCEDURE — 52000 CYSTOURETHROSCOPY: CPT

## 2024-03-22 ENCOUNTER — APPOINTMENT (OUTPATIENT)
Dept: MAMMOGRAPHY | Facility: CLINIC | Age: 74
End: 2024-03-22
Payer: MEDICARE

## 2024-03-22 ENCOUNTER — OUTPATIENT (OUTPATIENT)
Dept: OUTPATIENT SERVICES | Facility: HOSPITAL | Age: 74
LOS: 1 days | End: 2024-03-22
Payer: MEDICARE

## 2024-03-22 ENCOUNTER — NON-APPOINTMENT (OUTPATIENT)
Age: 74
End: 2024-03-22

## 2024-03-22 VITALS
DIASTOLIC BLOOD PRESSURE: 63 MMHG | OXYGEN SATURATION: 100 % | HEIGHT: 55 IN | TEMPERATURE: 97 F | RESPIRATION RATE: 16 BRPM | SYSTOLIC BLOOD PRESSURE: 142 MMHG | WEIGHT: 100.09 LBS | HEART RATE: 65 BPM

## 2024-03-22 DIAGNOSIS — Z98.49 CATARACT EXTRACTION STATUS, UNSPECIFIED EYE: Chronic | ICD-10-CM

## 2024-03-22 DIAGNOSIS — R92.8 OTHER ABNORMAL AND INCONCLUSIVE FINDINGS ON DIAGNOSTIC IMAGING OF BREAST: ICD-10-CM

## 2024-03-22 DIAGNOSIS — N63.20 UNSPECIFIED LUMP IN THE LEFT BREAST, UNSPECIFIED QUADRANT: ICD-10-CM

## 2024-03-22 DIAGNOSIS — Z01.818 ENCOUNTER FOR OTHER PREPROCEDURAL EXAMINATION: ICD-10-CM

## 2024-03-22 LAB
A1C WITH ESTIMATED AVERAGE GLUCOSE RESULT: 9.3 % — HIGH (ref 4–5.6)
ABO RH CONFIRMATION: SIGNIFICANT CHANGE UP
ALBUMIN SERPL ELPH-MCNC: 4 G/DL
ALP BLD-CCNC: 62 U/L
ALT SERPL-CCNC: 34 U/L
ANION GAP SERPL CALC-SCNC: 10 MMOL/L
ANION GAP SERPL CALC-SCNC: 6 MMOL/L — SIGNIFICANT CHANGE UP (ref 5–17)
APPEARANCE UR: ABNORMAL
APTT BLD: 29.1 SEC — SIGNIFICANT CHANGE UP (ref 24.5–35.6)
AST SERPL-CCNC: 34 U/L
BACTERIA # UR AUTO: ABNORMAL /HPF
BASOPHILS # BLD AUTO: 0.07 K/UL — SIGNIFICANT CHANGE UP (ref 0–0.2)
BASOPHILS NFR BLD AUTO: 0.7 % — SIGNIFICANT CHANGE UP (ref 0–2)
BILIRUB SERPL-MCNC: 0.2 MG/DL
BILIRUB UR-MCNC: NEGATIVE — SIGNIFICANT CHANGE UP
BLD GP AB SCN SERPL QL: SIGNIFICANT CHANGE UP
BUN SERPL-MCNC: 15 MG/DL
BUN SERPL-MCNC: 15 MG/DL — SIGNIFICANT CHANGE UP (ref 7–23)
CALCIUM SERPL-MCNC: 8.8 MG/DL
CALCIUM SERPL-MCNC: 9.2 MG/DL — SIGNIFICANT CHANGE UP (ref 8.5–10.1)
CAST: 0 /LPF — SIGNIFICANT CHANGE UP (ref 0–4)
CHLORIDE SERPL-SCNC: 96 MMOL/L
CHLORIDE SERPL-SCNC: 97 MMOL/L — SIGNIFICANT CHANGE UP (ref 96–108)
CHOLEST SERPL-MCNC: 131 MG/DL
CO2 SERPL-SCNC: 25 MMOL/L
CO2 SERPL-SCNC: 28 MMOL/L — SIGNIFICANT CHANGE UP (ref 22–31)
COLOR SPEC: YELLOW — SIGNIFICANT CHANGE UP
CREAT SERPL-MCNC: 0.6 MG/DL
CREAT SERPL-MCNC: 0.67 MG/DL — SIGNIFICANT CHANGE UP (ref 0.5–1.3)
CREAT SPEC-SCNC: 72 MG/DL
DIFF PNL FLD: ABNORMAL
EGFR: 91 ML/MIN/1.73M2 — SIGNIFICANT CHANGE UP
EGFR: 94 ML/MIN/1.73M2
EOSINOPHIL # BLD AUTO: 0.6 K/UL — HIGH (ref 0–0.5)
EOSINOPHIL NFR BLD AUTO: 5.7 % — SIGNIFICANT CHANGE UP (ref 0–6)
ESTIMATED AVERAGE GLUCOSE: 214 MG/DL
ESTIMATED AVERAGE GLUCOSE: 220 MG/DL — HIGH (ref 68–114)
GLUCOSE SERPL-MCNC: 196 MG/DL
GLUCOSE SERPL-MCNC: 201 MG/DL — HIGH (ref 70–99)
GLUCOSE UR QL: 250 MG/DL
HBA1C MFR BLD HPLC: 9.1 %
HCT VFR BLD CALC: 34.9 % — SIGNIFICANT CHANGE UP (ref 34.5–45)
HDLC SERPL-MCNC: 45 MG/DL
HGB BLD-MCNC: 11.6 G/DL — SIGNIFICANT CHANGE UP (ref 11.5–15.5)
IMM GRANULOCYTES NFR BLD AUTO: 1.1 % — HIGH (ref 0–0.9)
INR BLD: 0.85 RATIO — SIGNIFICANT CHANGE UP (ref 0.85–1.18)
KETONES UR-MCNC: NEGATIVE MG/DL — SIGNIFICANT CHANGE UP
LDLC SERPL CALC-MCNC: 73 MG/DL
LEUKOCYTE ESTERASE UR-ACNC: ABNORMAL
LYMPHOCYTES # BLD AUTO: 1.68 K/UL — SIGNIFICANT CHANGE UP (ref 1–3.3)
LYMPHOCYTES # BLD AUTO: 16 % — SIGNIFICANT CHANGE UP (ref 13–44)
MCHC RBC-ENTMCNC: 26.9 PG — LOW (ref 27–34)
MCHC RBC-ENTMCNC: 33.2 GM/DL — SIGNIFICANT CHANGE UP (ref 32–36)
MCV RBC AUTO: 81 FL — SIGNIFICANT CHANGE UP (ref 80–100)
MICROALBUMIN 24H UR DL<=1MG/L-MCNC: 5.8 MG/DL
MICROALBUMIN/CREAT 24H UR-RTO: 81 MG/G
MONOCYTES # BLD AUTO: 0.75 K/UL — SIGNIFICANT CHANGE UP (ref 0–0.9)
MONOCYTES NFR BLD AUTO: 7.1 % — SIGNIFICANT CHANGE UP (ref 2–14)
NEUTROPHILS # BLD AUTO: 7.29 K/UL — SIGNIFICANT CHANGE UP (ref 1.8–7.4)
NEUTROPHILS NFR BLD AUTO: 69.4 % — SIGNIFICANT CHANGE UP (ref 43–77)
NITRITE UR-MCNC: NEGATIVE — SIGNIFICANT CHANGE UP
NONHDLC SERPL-MCNC: 86 MG/DL
PH UR: 6 — SIGNIFICANT CHANGE UP (ref 5–8)
PLATELET # BLD AUTO: 436 K/UL — HIGH (ref 150–400)
POTASSIUM SERPL-MCNC: 5.1 MMOL/L — SIGNIFICANT CHANGE UP (ref 3.5–5.3)
POTASSIUM SERPL-SCNC: 4.9 MMOL/L
POTASSIUM SERPL-SCNC: 5.1 MMOL/L — SIGNIFICANT CHANGE UP (ref 3.5–5.3)
PROT SERPL-MCNC: 6.1 G/DL
PROT UR-MCNC: NEGATIVE MG/DL — SIGNIFICANT CHANGE UP
PROTHROM AB SERPL-ACNC: 9.6 SEC — SIGNIFICANT CHANGE UP (ref 9.5–13)
RBC # BLD: 4.31 M/UL — SIGNIFICANT CHANGE UP (ref 3.8–5.2)
RBC # FLD: 13.4 % — SIGNIFICANT CHANGE UP (ref 10.3–14.5)
RBC CASTS # UR COMP ASSIST: 1 /HPF — SIGNIFICANT CHANGE UP (ref 0–4)
SODIUM SERPL-SCNC: 131 MMOL/L
SODIUM SERPL-SCNC: 131 MMOL/L — LOW (ref 135–145)
SP GR SPEC: 1.01 — SIGNIFICANT CHANGE UP (ref 1–1.03)
SQUAMOUS # UR AUTO: 0 /HPF — SIGNIFICANT CHANGE UP (ref 0–5)
TRIGL SERPL-MCNC: 63 MG/DL
UROBILINOGEN FLD QL: 0.2 MG/DL — SIGNIFICANT CHANGE UP (ref 0.2–1)
WBC # BLD: 10.51 K/UL — HIGH (ref 3.8–10.5)
WBC # FLD AUTO: 10.51 K/UL — HIGH (ref 3.8–10.5)
WBC UR QL: 756 /HPF — HIGH (ref 0–5)

## 2024-03-22 PROCEDURE — 86900 BLOOD TYPING SEROLOGIC ABO: CPT

## 2024-03-22 PROCEDURE — 99214 OFFICE O/P EST MOD 30 MIN: CPT | Mod: 25

## 2024-03-22 PROCEDURE — 86901 BLOOD TYPING SEROLOGIC RH(D): CPT

## 2024-03-22 PROCEDURE — 93010 ELECTROCARDIOGRAM REPORT: CPT

## 2024-03-22 PROCEDURE — 85025 COMPLETE CBC W/AUTO DIFF WBC: CPT

## 2024-03-22 PROCEDURE — 77065 DX MAMMO INCL CAD UNI: CPT

## 2024-03-22 PROCEDURE — 80048 BASIC METABOLIC PNL TOTAL CA: CPT

## 2024-03-22 PROCEDURE — 86850 RBC ANTIBODY SCREEN: CPT

## 2024-03-22 PROCEDURE — 83036 HEMOGLOBIN GLYCOSYLATED A1C: CPT

## 2024-03-22 PROCEDURE — 81001 URINALYSIS AUTO W/SCOPE: CPT

## 2024-03-22 PROCEDURE — 77065 DX MAMMO INCL CAD UNI: CPT | Mod: 26,LT

## 2024-03-22 PROCEDURE — 36415 COLL VENOUS BLD VENIPUNCTURE: CPT

## 2024-03-22 PROCEDURE — 85730 THROMBOPLASTIN TIME PARTIAL: CPT

## 2024-03-22 PROCEDURE — 85610 PROTHROMBIN TIME: CPT

## 2024-03-22 PROCEDURE — 93005 ELECTROCARDIOGRAM TRACING: CPT

## 2024-03-22 RX ORDER — LEVOTHYROXINE SODIUM 125 MCG
1 TABLET ORAL
Refills: 0 | DISCHARGE

## 2024-03-22 NOTE — H&P PST ADULT - ASSESSMENT
73 years old female present to PST prior to left breast lumpectomy with Dr. Espinoza.    Patient denies signs and symptoms of Covid 19 such as shortness of breath/ difficulty breathing, fever/chills, cough, muscle /body ache, headache, loss of taste or smell.    Plan   1. Education and Instructions given to patient regarding upcoming surgery  2. NPO as per ASU  3. Take the following medications with sips of water on the day of procedure: Losartan, Norvasc, Levothyroxine  4. Use E-Z sponge as directed  5. Use Mupirocin as directed.  6. Drink a quart of extra  fluids the day before your surgery.  7. Medical optimization for surgery with Dr. Cadena  8. CBC, BMP, HGB A1C, PT/ INR and PTT, Urinalysis, Type and Screen done in PST and sent to lab  9. EKG done in PST  10. Do not take NSAIDS, Aspirin, Herbal supplements, Multivitamins, Vitamin E or Fish oil 7 days prior to surgery

## 2024-03-22 NOTE — H&P PST ADULT - HISTORY OF PRESENT ILLNESS
73 years old female with abnormal findings from breast imaging. She had a routine mammogram 1/21/24. Her last Mammogram "was years ago". Denies tenderness or pain to left breast prior to biopsy. She had a biopsy 2/22/24. Denies nipple discharge or dimpling to left breast. Planned left breast lumpectomy. Reports a non productive cough "which started last night".

## 2024-03-22 NOTE — H&P PST ADULT - NSICDXPASTMEDICALHX_GEN_ALL_CORE_FT
PAST MEDICAL HISTORY:  Benign essential HTN     History of cataract     HLD (hyperlipidemia)     Hyponatremia     Osteoporosis     Other abnormal and inconclusive findings on diagnostic imaging of breast     Primary hypothyroidism     Seasonal allergies     Spondylosis of cervical spine     Type 2 diabetes mellitus     Urinary incontinence

## 2024-03-22 NOTE — H&P PST ADULT - NSICDXFAMILYHX_GEN_ALL_CORE_FT
FAMILY HISTORY:  Sibling  Still living? No  FH: diabetes mellitus, Age at diagnosis: Age Unknown  FH: diabetes mellitus, Age at diagnosis: Age Unknown  FH: diabetes mellitus, Age at diagnosis: Age Unknown  FH: diabetes mellitus, Age at diagnosis: Age Unknown

## 2024-03-23 DIAGNOSIS — Z01.818 ENCOUNTER FOR OTHER PREPROCEDURAL EXAMINATION: ICD-10-CM

## 2024-03-23 DIAGNOSIS — R92.8 OTHER ABNORMAL AND INCONCLUSIVE FINDINGS ON DIAGNOSTIC IMAGING OF BREAST: ICD-10-CM

## 2024-03-24 ENCOUNTER — OUTPATIENT (OUTPATIENT)
Dept: OUTPATIENT SERVICES | Facility: HOSPITAL | Age: 74
LOS: 1 days | End: 2024-03-24
Payer: MEDICARE

## 2024-03-24 ENCOUNTER — RESULT REVIEW (OUTPATIENT)
Age: 74
End: 2024-03-24

## 2024-03-24 DIAGNOSIS — Z98.49 CATARACT EXTRACTION STATUS, UNSPECIFIED EYE: Chronic | ICD-10-CM

## 2024-03-24 DIAGNOSIS — R92.8 OTHER ABNORMAL AND INCONCLUSIVE FINDINGS ON DIAGNOSTIC IMAGING OF BREAST: ICD-10-CM

## 2024-03-24 PROBLEM — E87.1 HYPO-OSMOLALITY AND HYPONATREMIA: Chronic | Status: ACTIVE | Noted: 2024-03-22

## 2024-03-24 PROCEDURE — 88321 CONSLTJ&REPRT SLD PREP ELSWR: CPT

## 2024-03-25 ENCOUNTER — APPOINTMENT (OUTPATIENT)
Dept: INTERNAL MEDICINE | Facility: CLINIC | Age: 74
End: 2024-03-25

## 2024-03-25 ENCOUNTER — INPATIENT (INPATIENT)
Facility: HOSPITAL | Age: 74
LOS: 2 days | Discharge: HOME | DRG: 177 | End: 2024-03-28
Attending: STUDENT IN AN ORGANIZED HEALTH CARE EDUCATION/TRAINING PROGRAM | Admitting: HOSPITALIST
Payer: MEDICARE

## 2024-03-25 VITALS
WEIGHT: 97.22 LBS | OXYGEN SATURATION: 97 % | HEART RATE: 68 BPM | RESPIRATION RATE: 16 BRPM | TEMPERATURE: 98 F | HEIGHT: 55 IN | SYSTOLIC BLOOD PRESSURE: 141 MMHG | DIASTOLIC BLOOD PRESSURE: 70 MMHG

## 2024-03-25 DIAGNOSIS — Z98.49 CATARACT EXTRACTION STATUS, UNSPECIFIED EYE: Chronic | ICD-10-CM

## 2024-03-25 DIAGNOSIS — E87.1 HYPO-OSMOLALITY AND HYPONATREMIA: ICD-10-CM

## 2024-03-25 DIAGNOSIS — R92.8 OTHER ABNORMAL AND INCONCLUSIVE FINDINGS ON DIAGNOSTIC IMAGING OF BREAST: ICD-10-CM

## 2024-03-25 LAB
ALBUMIN SERPL ELPH-MCNC: 3.6 G/DL — SIGNIFICANT CHANGE UP (ref 3.3–5.2)
ALP SERPL-CCNC: 66 U/L — SIGNIFICANT CHANGE UP (ref 40–120)
ALT FLD-CCNC: 28 U/L — SIGNIFICANT CHANGE UP
ANION GAP SERPL CALC-SCNC: 12 MMOL/L — SIGNIFICANT CHANGE UP (ref 5–17)
ANISOCYTOSIS BLD QL: SLIGHT — SIGNIFICANT CHANGE UP
APPEARANCE UR: ABNORMAL
APPEARANCE: CLEAR
AST SERPL-CCNC: 13 U/L — SIGNIFICANT CHANGE UP
BACTERIA # UR AUTO: ABNORMAL /HPF
BACTERIA UR CULT: NORMAL
BACTERIA: NEGATIVE /HPF
BASO STIPL BLD QL SMEAR: PRESENT — SIGNIFICANT CHANGE UP
BASOPHILS # BLD AUTO: 0.07 K/UL — SIGNIFICANT CHANGE UP (ref 0–0.2)
BASOPHILS NFR BLD AUTO: 0.9 % — SIGNIFICANT CHANGE UP (ref 0–2)
BILIRUB SERPL-MCNC: <0.2 MG/DL — LOW (ref 0.4–2)
BILIRUB UR-MCNC: NEGATIVE — SIGNIFICANT CHANGE UP
BILIRUBIN URINE: NEGATIVE
BLOOD URINE: NEGATIVE
BUN SERPL-MCNC: 18.2 MG/DL — SIGNIFICANT CHANGE UP (ref 8–20)
CALCIUM SERPL-MCNC: 8.4 MG/DL — SIGNIFICANT CHANGE UP (ref 8.4–10.5)
CAST: 0 /LPF
CAST: 2 /LPF — SIGNIFICANT CHANGE UP (ref 0–4)
CHLORIDE SERPL-SCNC: 88 MMOL/L — LOW (ref 96–108)
CO2 SERPL-SCNC: 23 MMOL/L — SIGNIFICANT CHANGE UP (ref 22–29)
COLOR SPEC: YELLOW — SIGNIFICANT CHANGE UP
COLOR: YELLOW
CREAT ?TM UR-MCNC: 89 MG/DL — SIGNIFICANT CHANGE UP
CREAT SERPL-MCNC: 0.59 MG/DL — SIGNIFICANT CHANGE UP (ref 0.5–1.3)
DIFF PNL FLD: ABNORMAL
EGFR: 94 ML/MIN/1.73M2 — SIGNIFICANT CHANGE UP
EOSINOPHIL # BLD AUTO: 0 K/UL — SIGNIFICANT CHANGE UP (ref 0–0.5)
EOSINOPHIL NFR BLD AUTO: 0 % — SIGNIFICANT CHANGE UP (ref 0–6)
EPITHELIAL CELLS: 2 /HPF
FLUAV AG NPH QL: SIGNIFICANT CHANGE UP
FLUBV AG NPH QL: SIGNIFICANT CHANGE UP
GIANT PLATELETS BLD QL SMEAR: PRESENT — SIGNIFICANT CHANGE UP
GLUCOSE BLDC GLUCOMTR-MCNC: 269 MG/DL — HIGH (ref 70–99)
GLUCOSE QUALITATIVE U: 100 MG/DL
GLUCOSE SERPL-MCNC: 217 MG/DL — HIGH (ref 70–99)
GLUCOSE UR QL: >=1000 MG/DL
HCT VFR BLD CALC: 32.8 % — LOW (ref 34.5–45)
HGB BLD-MCNC: 11.4 G/DL — LOW (ref 11.5–15.5)
KETONES UR-MCNC: ABNORMAL MG/DL
KETONES URINE: NEGATIVE MG/DL
LEUKOCYTE ESTERASE UR-ACNC: ABNORMAL
LEUKOCYTE ESTERASE URINE: ABNORMAL
LYMPHOCYTES # BLD AUTO: 1.14 K/UL — SIGNIFICANT CHANGE UP (ref 1–3.3)
LYMPHOCYTES # BLD AUTO: 15.8 % — SIGNIFICANT CHANGE UP (ref 13–44)
MACROCYTES BLD QL: SLIGHT — SIGNIFICANT CHANGE UP
MANUAL SMEAR VERIFICATION: SIGNIFICANT CHANGE UP
MCHC RBC-ENTMCNC: 27.3 PG — SIGNIFICANT CHANGE UP (ref 27–34)
MCHC RBC-ENTMCNC: 34.8 GM/DL — SIGNIFICANT CHANGE UP (ref 32–36)
MCV RBC AUTO: 78.7 FL — LOW (ref 80–100)
METAMYELOCYTES # FLD: 0.9 % — HIGH (ref 0–0)
MICROCYTES BLD QL: SLIGHT — SIGNIFICANT CHANGE UP
MICROSCOPIC-UA: NORMAL
MONOCYTES # BLD AUTO: 0.89 K/UL — SIGNIFICANT CHANGE UP (ref 0–0.9)
MONOCYTES NFR BLD AUTO: 12.3 % — SIGNIFICANT CHANGE UP (ref 2–14)
MYELOCYTES NFR BLD: 3.5 % — HIGH (ref 0–0)
NEUTROPHILS # BLD AUTO: 4.57 K/UL — SIGNIFICANT CHANGE UP (ref 1.8–7.4)
NEUTROPHILS NFR BLD AUTO: 63.1 % — SIGNIFICANT CHANGE UP (ref 43–77)
NITRITE UR-MCNC: POSITIVE
NITRITE URINE: NEGATIVE
OSMOLALITY UR: 627 MOSM/KG — SIGNIFICANT CHANGE UP (ref 300–1000)
OVALOCYTES BLD QL SMEAR: SLIGHT — SIGNIFICANT CHANGE UP
PH UR: 6 — SIGNIFICANT CHANGE UP (ref 5–8)
PH URINE: 6
PLAT MORPH BLD: NORMAL — SIGNIFICANT CHANGE UP
PLATELET # BLD AUTO: 405 K/UL — HIGH (ref 150–400)
POIKILOCYTOSIS BLD QL AUTO: SLIGHT — SIGNIFICANT CHANGE UP
POLYCHROMASIA BLD QL SMEAR: SLIGHT — SIGNIFICANT CHANGE UP
POTASSIUM SERPL-MCNC: 4.4 MMOL/L — SIGNIFICANT CHANGE UP (ref 3.5–5.3)
POTASSIUM SERPL-SCNC: 4.4 MMOL/L — SIGNIFICANT CHANGE UP (ref 3.5–5.3)
PROT SERPL-MCNC: 6 G/DL — LOW (ref 6.6–8.7)
PROT UR-MCNC: 30 MG/DL
PROTEIN URINE: 30 MG/DL
RBC # BLD: 4.17 M/UL — SIGNIFICANT CHANGE UP (ref 3.8–5.2)
RBC # FLD: 13.1 % — SIGNIFICANT CHANGE UP (ref 10.3–14.5)
RBC BLD AUTO: ABNORMAL
RBC CASTS # UR COMP ASSIST: 1 /HPF — SIGNIFICANT CHANGE UP (ref 0–4)
RED BLOOD CELLS URINE: 2 /HPF
RSV RNA NPH QL NAA+NON-PROBE: SIGNIFICANT CHANGE UP
SARS-COV-2 RNA SPEC QL NAA+PROBE: DETECTED
SMUDGE CELLS # BLD: PRESENT — SIGNIFICANT CHANGE UP
SODIUM SERPL-SCNC: 123 MMOL/L — LOW (ref 135–145)
SODIUM UR-SCNC: <30 MMOL/L — SIGNIFICANT CHANGE UP
SP GR SPEC: 1.02 — SIGNIFICANT CHANGE UP (ref 1–1.03)
SPECIFIC GRAVITY URINE: 1.02
SQUAMOUS # UR AUTO: 0 /HPF — SIGNIFICANT CHANGE UP (ref 0–5)
SURGICAL PATHOLOGY STUDY: SIGNIFICANT CHANGE UP
TSH SERPL-MCNC: 7.65 UIU/ML — HIGH (ref 0.27–4.2)
UROBILINOGEN FLD QL: 1 MG/DL — SIGNIFICANT CHANGE UP (ref 0.2–1)
UROBILINOGEN URINE: 0.2 MG/DL
VARIANT LYMPHS # BLD: 3.5 % — SIGNIFICANT CHANGE UP (ref 0–6)
WBC # BLD: 7.24 K/UL — SIGNIFICANT CHANGE UP (ref 3.8–10.5)
WBC # FLD AUTO: 7.24 K/UL — SIGNIFICANT CHANGE UP (ref 3.8–10.5)
WBC UR QL: 277 /HPF — HIGH (ref 0–5)
WHITE BLOOD CELLS URINE: 6 /HPF

## 2024-03-25 PROCEDURE — 70450 CT HEAD/BRAIN W/O DYE: CPT | Mod: 26

## 2024-03-25 PROCEDURE — 71045 X-RAY EXAM CHEST 1 VIEW: CPT | Mod: 26

## 2024-03-25 PROCEDURE — 93010 ELECTROCARDIOGRAM REPORT: CPT

## 2024-03-25 PROCEDURE — 99285 EMERGENCY DEPT VISIT HI MDM: CPT

## 2024-03-25 PROCEDURE — 99223 1ST HOSP IP/OBS HIGH 75: CPT | Mod: GC

## 2024-03-25 PROCEDURE — 99497 ADVNCD CARE PLAN 30 MIN: CPT | Mod: 25

## 2024-03-25 RX ORDER — DEXTROSE 50 % IN WATER 50 %
15 SYRINGE (ML) INTRAVENOUS ONCE
Refills: 0 | Status: DISCONTINUED | OUTPATIENT
Start: 2024-03-25 | End: 2024-03-28

## 2024-03-25 RX ORDER — DEXTROSE 50 % IN WATER 50 %
25 SYRINGE (ML) INTRAVENOUS ONCE
Refills: 0 | Status: DISCONTINUED | OUTPATIENT
Start: 2024-03-25 | End: 2024-03-28

## 2024-03-25 RX ORDER — INSULIN LISPRO 100/ML
VIAL (ML) SUBCUTANEOUS
Refills: 0 | Status: DISCONTINUED | OUTPATIENT
Start: 2024-03-25 | End: 2024-03-28

## 2024-03-25 RX ORDER — SITAGLIPTIN AND METFORMIN HYDROCHLORIDE 500; 50 MG/1; MG/1
1 TABLET, FILM COATED ORAL
Refills: 0 | DISCHARGE

## 2024-03-25 RX ORDER — LANOLIN ALCOHOL/MO/W.PET/CERES
3 CREAM (GRAM) TOPICAL AT BEDTIME
Refills: 0 | Status: DISCONTINUED | OUTPATIENT
Start: 2024-03-25 | End: 2024-03-28

## 2024-03-25 RX ORDER — AMLODIPINE BESYLATE 2.5 MG/1
10 TABLET ORAL DAILY
Refills: 0 | Status: DISCONTINUED | OUTPATIENT
Start: 2024-03-25 | End: 2024-03-28

## 2024-03-25 RX ORDER — LOSARTAN POTASSIUM 100 MG/1
1 TABLET, FILM COATED ORAL
Refills: 0 | DISCHARGE

## 2024-03-25 RX ORDER — DEXTROSE 50 % IN WATER 50 %
12.5 SYRINGE (ML) INTRAVENOUS ONCE
Refills: 0 | Status: DISCONTINUED | OUTPATIENT
Start: 2024-03-25 | End: 2024-03-28

## 2024-03-25 RX ORDER — ONDANSETRON 8 MG/1
4 TABLET, FILM COATED ORAL EVERY 8 HOURS
Refills: 0 | Status: DISCONTINUED | OUTPATIENT
Start: 2024-03-25 | End: 2024-03-28

## 2024-03-25 RX ORDER — CEFTRIAXONE 500 MG/1
1000 INJECTION, POWDER, FOR SOLUTION INTRAMUSCULAR; INTRAVENOUS ONCE
Refills: 0 | Status: DISCONTINUED | OUTPATIENT
Start: 2024-03-25 | End: 2024-03-25

## 2024-03-25 RX ORDER — SODIUM CHLORIDE 9 MG/ML
1000 INJECTION, SOLUTION INTRAVENOUS
Refills: 0 | Status: DISCONTINUED | OUTPATIENT
Start: 2024-03-25 | End: 2024-03-28

## 2024-03-25 RX ORDER — GUAIFENESIN/DEXTROMETHORPHAN 600MG-30MG
10 TABLET, EXTENDED RELEASE 12 HR ORAL EVERY 4 HOURS
Refills: 0 | Status: DISCONTINUED | OUTPATIENT
Start: 2024-03-25 | End: 2024-03-28

## 2024-03-25 RX ORDER — LEVOTHYROXINE SODIUM 125 MCG
100 TABLET ORAL
Refills: 0 | Status: DISCONTINUED | OUTPATIENT
Start: 2024-03-25 | End: 2024-03-28

## 2024-03-25 RX ORDER — REPAGLINIDE 1 MG/1
1 TABLET ORAL
Refills: 0 | DISCHARGE

## 2024-03-25 RX ORDER — GLUCAGON INJECTION, SOLUTION 0.5 MG/.1ML
1 INJECTION, SOLUTION SUBCUTANEOUS ONCE
Refills: 0 | Status: DISCONTINUED | OUTPATIENT
Start: 2024-03-25 | End: 2024-03-28

## 2024-03-25 RX ORDER — SODIUM CHLORIDE 9 MG/ML
1000 INJECTION INTRAMUSCULAR; INTRAVENOUS; SUBCUTANEOUS ONCE
Refills: 0 | Status: COMPLETED | OUTPATIENT
Start: 2024-03-25 | End: 2024-03-25

## 2024-03-25 RX ORDER — CEFTRIAXONE 500 MG/1
1000 INJECTION, POWDER, FOR SOLUTION INTRAMUSCULAR; INTRAVENOUS ONCE
Refills: 0 | Status: COMPLETED | OUTPATIENT
Start: 2024-03-25 | End: 2024-03-25

## 2024-03-25 RX ORDER — INSULIN LISPRO 100/ML
VIAL (ML) SUBCUTANEOUS AT BEDTIME
Refills: 0 | Status: DISCONTINUED | OUTPATIENT
Start: 2024-03-25 | End: 2024-03-28

## 2024-03-25 RX ORDER — SODIUM CHLORIDE 9 MG/ML
1000 INJECTION INTRAMUSCULAR; INTRAVENOUS; SUBCUTANEOUS
Refills: 0 | Status: DISCONTINUED | OUTPATIENT
Start: 2024-03-25 | End: 2024-03-28

## 2024-03-25 RX ORDER — ACETAMINOPHEN 500 MG
650 TABLET ORAL EVERY 6 HOURS
Refills: 0 | Status: DISCONTINUED | OUTPATIENT
Start: 2024-03-25 | End: 2024-03-28

## 2024-03-25 RX ORDER — LEVOTHYROXINE SODIUM 125 MCG
50 TABLET ORAL
Refills: 0 | Status: DISCONTINUED | OUTPATIENT
Start: 2024-03-25 | End: 2024-03-28

## 2024-03-25 RX ORDER — ALENDRONATE SODIUM 70 MG/1
1 TABLET ORAL
Refills: 0 | DISCHARGE

## 2024-03-25 RX ORDER — ACETAMINOPHEN 500 MG
2 TABLET ORAL
Refills: 0 | DISCHARGE

## 2024-03-25 RX ORDER — SIMVASTATIN 20 MG/1
10 TABLET, FILM COATED ORAL AT BEDTIME
Refills: 0 | Status: DISCONTINUED | OUTPATIENT
Start: 2024-03-25 | End: 2024-03-28

## 2024-03-25 RX ORDER — HEPARIN SODIUM 5000 [USP'U]/ML
5000 INJECTION INTRAVENOUS; SUBCUTANEOUS EVERY 8 HOURS
Refills: 0 | Status: DISCONTINUED | OUTPATIENT
Start: 2024-03-25 | End: 2024-03-28

## 2024-03-25 RX ADMIN — SIMVASTATIN 10 MILLIGRAM(S): 20 TABLET, FILM COATED ORAL at 23:11

## 2024-03-25 RX ADMIN — SODIUM CHLORIDE 500 MILLILITER(S): 9 INJECTION INTRAMUSCULAR; INTRAVENOUS; SUBCUTANEOUS at 16:33

## 2024-03-25 RX ADMIN — Medication 2: at 23:14

## 2024-03-25 RX ADMIN — CEFTRIAXONE 1000 MILLIGRAM(S): 500 INJECTION, POWDER, FOR SOLUTION INTRAMUSCULAR; INTRAVENOUS at 17:49

## 2024-03-25 RX ADMIN — HEPARIN SODIUM 5000 UNIT(S): 5000 INJECTION INTRAVENOUS; SUBCUTANEOUS at 23:11

## 2024-03-25 NOTE — ED PROVIDER NOTE - CLINICAL SUMMARY MEDICAL DECISION MAKING FREE TEXT BOX
74 y/o female with symptomatic hyponatremia, Nausea/Vomitting/Diarrhea, Hx of DM-2, Hypothyroidism, HTN, HLD, UTI presenting w/ weakness. labs, urine, cultures, ekg, cxr ordered.

## 2024-03-25 NOTE — ED ADULT NURSE NOTE - NSFALLHARMRISKINTERV_ED_ALL_ED
Assistance OOB with selected safe patient handling equipment if applicable/Assistance with ambulation/Communicate risk of Fall with Harm to all staff, patient, and family/Encourage patient to sit up slowly, dangle for a short time, stand at bedside before walking/Monitor gait and stability/Orthostatic vital signs/Provide visual cue: red socks, yellow wristband, yellow gown, etc/Reinforce activity limits and safety measures with patient and family/Bed in lowest position, wheels locked, appropriate side rails in place/Call bell, personal items and telephone in reach/Instruct patient to call for assistance before getting out of bed/chair/stretcher/Non-slip footwear applied when patient is off stretcher/[place] to call system/Physically safe environment - no spills, clutter or unnecessary equipment/Purposeful Proactive Rounding/Room/bathroom lighting operational, light cord in reach

## 2024-03-25 NOTE — H&P ADULT - NSHPSOCIALHISTORY_GEN_ALL_CORE
Lives in [place], but has been in the [place] for some time since her  passed and she is living with daughter and daughter's family. Denies smoking and alcohol use.

## 2024-03-25 NOTE — ED ADULT TRIAGE NOTE - CHIEF COMPLAINT QUOTE
A&O x 3 d/c from [facility] for hyponatremia c/o weakness and dizziness; "I feel like I'm going to fall."

## 2024-03-25 NOTE — H&P ADULT - CONVERSATION DETAILS
Discussed the treatment options in the event that patient were to go into cardiac arrest including chest compressions and intubation. Explain in detail what this entails. Family (HCP) believes patient would like all measure to be taken. Verbalized understanding and consent.

## 2024-03-25 NOTE — H&P ADULT - NSHPPHYSICALEXAM_GEN_ALL_CORE
GENERAL: no acute distress, comfortably in bed  HEENT: Atraumatic, normocephalic, non-icteric, no JVD  NEURO:  A&Ox3, no focal deficits, moving all extremities spontaneously, no dysarthria, CN II-XII grossly intact  PSYCH: Normal affect, calm, appropriate insight and judgment, fluent speech  LUNGS: CTAB, no wrr, non-labored breathing  HEART: RRR, no murmur appreciated  ABD: Soft, non-tender, non-distended, no organomegaly, no appreciable masses, +bs all 4 quadrants  EXTREMITIES: Nontender, no clubbing, cyanosis, or edema  SKIN: No rashes or lesions

## 2024-03-25 NOTE — H&P ADULT - ASSESSMENT
73y Female w/ PMH of uncontrolled NIDDM2, hypothyroidism, HTN, HLD, and chronic hyponatremia admitted for lethargy in setting of COVID infection and acute on chronic hyponatremia.    Plan  #Euvolemic hyponatremia, mixed etiology  -Pt likely has chronic hyponatremia given hx of decreased PO intake, uncontrolled diabetes resulting in likely osmotic diuresis, and possible contribution of SIADH from medication use  -Acute drop since prior hospitalization from 130 to 123  -Was given 1L NS bolus in ED  -Will recheck BMP now to see response to fluids  -Maintenance fluids with NS at 75 cc/hr given decreased PO intake  -Monitor BMP  -Nephrology consulted    #COVID  -Pt has no leukocytosis, non-concerning imaging findings, and no evidence of respiratory distress  -Will treat symptomatically and hold on Remdesivir/Decadron for now  -PRN anti-tussives and mucolytics    #DM2, uncontrolled  -A1c > 9  -ISS for now, can consider standing insulin pending sliding scale needs    #HTN  #HLD  -Hold ARB for now  -Continue home Norvasc  -Continue home statin    #Hypothyroidism  -Continue home levothyroxine - 100mcg two times per week, 50mcg on remaining days    DVT PPx: Heparin  Dispo: Admit to medicine 73y Female w/ PMH of uncontrolled NIDDM2, hypothyroidism, HTN, HLD, and chronic hyponatremia admitted for lethargy in setting of COVID infection and acute on chronic hyponatremia.    Plan  #Euvolemic hyponatremia, mixed etiology  -Pt likely has chronic hyponatremia given hx of decreased PO intake, uncontrolled diabetes resulting in likely osmotic diuresis, and possible contribution of SIADH from medication use  -Acute drop since prior hospitalization from 130 to 123  -Was given 1L NS bolus in ED  -Will recheck BMP now to see response to fluids  -Maintenance fluids with NS at 75 cc/hr given decreased PO intake  -Monitor BMP  -Nephrology consulted    #UTI  -Pt was treated for UTI on most recent admission  -UA with evidence for pyuria and positive leukocyte esterase  -3 days Rocephin  -UCx sent    #COVID  -Pt has no leukocytosis, non-concerning imaging findings, and no evidence of respiratory distress  -Will treat symptomatically and hold on Remdesivir/Decadron for now  -PRN anti-tussives and mucolytics    #DM2, uncontrolled  -A1c > 9  -ISS for now, can consider standing insulin pending sliding scale needs    #HTN  #HLD  -Hold ARB for now  -Continue home Norvasc  -Continue home statin    #Hypothyroidism  -Continue home levothyroxine - 100mcg two times per week, 50mcg on remaining days    DVT PPx: Heparin  Dispo: Admit to medicine 73y Female w/ PMH of uncontrolled NIDDM2, hypothyroidism, HTN, HLD, and chronic hyponatremia admitted for lethargy in setting of COVID infection and acute on chronic hyponatremia.    Plan  #Euvolemic hyponatremia, mixed etiology  -Pt likely has chronic hyponatremia given hx of decreased PO intake, uncontrolled diabetes resulting in likely osmotic diuresis, and possible contribution of SIADH from medication use  -Acute drop since prior hospitalization from 130 to 123  -Was given 1L NS bolus in ED  -Will recheck BMP now to see response to fluids  -Maintenance fluids with NS at 75 cc/hr given decreased PO intake  -Urine studies sent on prior admission - low urine sodium noted  -Monitor BMP  -Nephrology consulted    #UTI  -Pt was treated for UTI on most recent admission  -UA with evidence for pyuria and positive leukocyte esterase  -3 days Rocephin  -UCx sent    #COVID  -Pt has no leukocytosis, non-concerning imaging findings, and no evidence of respiratory distress  -Will treat symptomatically and hold on Remdesivir/Decadron for now  -PRN anti-tussives and mucolytics    #DM2, uncontrolled  -A1c > 9  -ISS for now, can consider standing insulin pending sliding scale needs    #HTN  #HLD  -Hold ARB for now  -Continue home Norvasc  -Continue home statin    #Hypothyroidism  -Continue home levothyroxine - 100mcg two times per week, 50mcg on remaining days    DVT PPx: Heparin  Dispo: Admit to medicine

## 2024-03-25 NOTE — ED PROVIDER NOTE - ATTENDING CONTRIBUTION TO CARE
Patient seen with resident.  Here with weakness and malaise with current URIsx and recent hx of HYPOnatremia.  Patient stating this seems similar to prior hyponatremia sx.  Daughter bedside.  Eats tomato/cucumber/toast only and drinks lots of water.  Does this to avoid foods that may raise her BP.  Otherwise baseline.  VSS.  Non toxic.  Well appearing.  No fever, No chest pain/palpitations, no SOB/cough/wheeze/stridor, No abdominal pain, No N/V/D, no dysuria/frequency/discharge, No neck/back pain, no rash,     Gen: Alert, NAD  Head: NC, AT, PERRL, EOMI, normal lids/conjunctiva  ENT: normal hearing, patent oropharynx   Neck: +supple, no tenderness/meningismus/JVD, +Trachea midline  Pulm: Bilateral BS, normal resp effort, no wheeze/stridor/retractions  CV: RRR, no R/G, +dist pulses  Abd: soft, NT/ND, +BS, no hepatosplenomegaly  Mskel: no edema/erythema/cyanosis  Skin: no rash  Neuro: AAOx3, no gross sensory/motor deficits

## 2024-03-25 NOTE — ED ADULT NURSE NOTE - OBJECTIVE STATEMENT
pt AOx3, speaks English and Mongolian, daughter at bedside, speaks english. pt placed on monitor, breathing even and unlabored. pt DCd from [facility] and called back due to labs showing hyponatremia. dizzy+, denies falls, N/V, pain. pending lab results.

## 2024-03-25 NOTE — H&P ADULT - HISTORY OF PRESENT ILLNESS
72 y/o F w/ PMH of uncontrolled NIDDM2, hypothyroidism, HTN, HLD, and chronic hyponatremia who presented to the ED after recent admission for symptomatic hyponatremia with complaints of generalized weakness and fatigue. Pt's son in law is at bedside providing collateral history. Pt was recently discharged from [facility] after being treated for symptomatic hyponatremia. At that time, sodium on admission was around 124, and patient was endorsing nausea and vomiting. She was also treated for a UTI and was ultimately discharged. Pt now returns with decreased PO intake, lethargy, congestion, and cough. Pt states she has not been eating as much during this time, and just snacking on things like olives and cheeses.     In ED, pt was found to be COVID positive. Sodium was 123.  72 y/o F w/ PMH of uncontrolled NIDDM2, hypothyroidism, HTN, HLD, and chronic hyponatremia who presented to the ED after recent admission for symptomatic hyponatremia with complaints of generalized weakness and fatigue. Pt's son in law is at bedside providing collateral history. Pt was recently discharged from [facility] after being treated for symptomatic hyponatremia. At that time, sodium on admission was around 124, and patient was endorsing nausea and vomiting. She was also treated for a UTI and was ultimately discharged. Pt now returns with decreased PO intake, lethargy, congestion, and non-productive cough. Pt states she has not been eating as much during this time, and just snacking on things like olives and cheeses. Lives at home with daughter and daughter's family, and thinks the kids may have been around someone who is sick. Denies chest pain, palpitations, f/c/n/v/d/c, abd pain, and dysuria.     In ED, pt was found to be COVID positive. Sodium was 123.

## 2024-03-25 NOTE — ED ADULT NURSE NOTE - CHIEF COMPLAINT QUOTE
A&O x 3 d/c from [facility] for hyponatremia c/o weakness and dizziness; "I feel like I'm going to fall."

## 2024-03-25 NOTE — H&P ADULT - ATTENDING COMMENTS
73y Female w/ PMH of uncontrolled NIDDM2, hypothyroidism, HTN, HLD, and chronic hyponatremia admitted for lethargy in setting of COVID infection and acute on chronic hyponatremia.    1.Hyponatremia  -Recurrent, likely diet induced; started on salt tabs last admission  -initial a 123, given 1L Bolus fluid; repeat BMP pending  -Trend Q4 hours until normalized, not to exceed 8 meq in first 24 hours  -Nephro c/s    2. COVID  -Not hypoxic, no indication for Remdesevir/Decadron  -Symptom management    3. UTI  -UA+ LE, nitrates, bacteria  -NO culture from prior admission  -Follow up cultures  -Treat with Rocephin for now    Remainder of plan as above in resident note. Agree with resident physical, assessment and plan as above.

## 2024-03-25 NOTE — H&P ADULT - NSHPLABSRESULTS_GEN_ALL_CORE
11.4   7.24  )-----------( 405      ( 25 Mar 2024 14:30 )             32.8     03-25    123<L>  |  88<L>  |  18.2  ----------------------------<  217<H>  4.4   |  23.0  |  0.59    Ca    8.4      25 Mar 2024 14:30    TPro  6.0<L>  /  Alb  3.6  /  TBili  <0.2<L>  /  DBili  x   /  AST  13  /  ALT  28  /  AlkPhos  66  03-25    < from: CT Head No Cont (03.25.24 @ 20:02) >    IMPRESSION:  No acute intracranial findings. No change comparing to 12/18/2023.    < end of copied text >    < from: Xray Chest 1 View- PORTABLE-Urgent (Xray Chest 1 View- PORTABLE-Urgent .) (03.25.24 @ 14:57) >      IMPRESSION: Negative chest.    < end of copied text >

## 2024-03-25 NOTE — ED PROVIDER NOTE - OBJECTIVE STATEMENT
74 y/o female with symptomatic hyponatremia, Nausea/Vomitting/Diarrhea, Hx of DM-2, Hypothyroidism, HTN, HLD, UTI presenting w/ generalized weakness. feels similar to when her sodium was low. denies fever, chills, dysuria, hematuria, abd pain, vomiting, diarrhea, or any other complaints.

## 2024-03-26 LAB
ANION GAP SERPL CALC-SCNC: 10 MMOL/L — SIGNIFICANT CHANGE UP (ref 5–17)
BUN SERPL-MCNC: 13 MG/DL — SIGNIFICANT CHANGE UP (ref 8–20)
CALCIUM SERPL-MCNC: 7.5 MG/DL — LOW (ref 8.4–10.5)
CHLORIDE SERPL-SCNC: 96 MMOL/L — SIGNIFICANT CHANGE UP (ref 96–108)
CO2 SERPL-SCNC: 22 MMOL/L — SIGNIFICANT CHANGE UP (ref 22–29)
CREAT SERPL-MCNC: 0.48 MG/DL — LOW (ref 0.5–1.3)
EGFR: 99 ML/MIN/1.73M2 — SIGNIFICANT CHANGE UP
GLUCOSE BLDC GLUCOMTR-MCNC: 214 MG/DL — HIGH (ref 70–99)
GLUCOSE BLDC GLUCOMTR-MCNC: 227 MG/DL — HIGH (ref 70–99)
GLUCOSE BLDC GLUCOMTR-MCNC: 318 MG/DL — HIGH (ref 70–99)
GLUCOSE BLDC GLUCOMTR-MCNC: 77 MG/DL — SIGNIFICANT CHANGE UP (ref 70–99)
GLUCOSE SERPL-MCNC: 181 MG/DL — HIGH (ref 70–99)
HCT VFR BLD CALC: 28 % — LOW (ref 34.5–45)
HGB BLD-MCNC: 9.4 G/DL — LOW (ref 11.5–15.5)
MAGNESIUM SERPL-MCNC: 1.4 MG/DL — LOW (ref 1.6–2.6)
MCHC RBC-ENTMCNC: 26.7 PG — LOW (ref 27–34)
MCHC RBC-ENTMCNC: 33.6 GM/DL — SIGNIFICANT CHANGE UP (ref 32–36)
MCV RBC AUTO: 79.5 FL — LOW (ref 80–100)
PLATELET # BLD AUTO: 358 K/UL — SIGNIFICANT CHANGE UP (ref 150–400)
POTASSIUM SERPL-MCNC: 4.3 MMOL/L — SIGNIFICANT CHANGE UP (ref 3.5–5.3)
POTASSIUM SERPL-SCNC: 4.3 MMOL/L — SIGNIFICANT CHANGE UP (ref 3.5–5.3)
RBC # BLD: 3.52 M/UL — LOW (ref 3.8–5.2)
RBC # FLD: 13.2 % — SIGNIFICANT CHANGE UP (ref 10.3–14.5)
SODIUM SERPL-SCNC: 128 MMOL/L — LOW (ref 135–145)
WBC # BLD: 7.14 K/UL — SIGNIFICANT CHANGE UP (ref 3.8–10.5)
WBC # FLD AUTO: 7.14 K/UL — SIGNIFICANT CHANGE UP (ref 3.8–10.5)

## 2024-03-26 PROCEDURE — 99233 SBSQ HOSP IP/OBS HIGH 50: CPT

## 2024-03-26 PROCEDURE — 99223 1ST HOSP IP/OBS HIGH 75: CPT

## 2024-03-26 RX ORDER — CEFTRIAXONE 500 MG/1
1000 INJECTION, POWDER, FOR SOLUTION INTRAMUSCULAR; INTRAVENOUS EVERY 24 HOURS
Refills: 0 | Status: COMPLETED | OUTPATIENT
Start: 2024-03-26 | End: 2024-03-28

## 2024-03-26 RX ADMIN — SIMVASTATIN 10 MILLIGRAM(S): 20 TABLET, FILM COATED ORAL at 22:24

## 2024-03-26 RX ADMIN — Medication 4: at 07:48

## 2024-03-26 RX ADMIN — HEPARIN SODIUM 5000 UNIT(S): 5000 INJECTION INTRAVENOUS; SUBCUTANEOUS at 14:44

## 2024-03-26 RX ADMIN — Medication 8: at 18:12

## 2024-03-26 RX ADMIN — CEFTRIAXONE 1000 MILLIGRAM(S): 500 INJECTION, POWDER, FOR SOLUTION INTRAMUSCULAR; INTRAVENOUS at 18:12

## 2024-03-26 RX ADMIN — AMLODIPINE BESYLATE 10 MILLIGRAM(S): 2.5 TABLET ORAL at 05:18

## 2024-03-26 RX ADMIN — Medication 50 MICROGRAM(S): at 07:48

## 2024-03-26 RX ADMIN — Medication 4: at 11:59

## 2024-03-26 RX ADMIN — HEPARIN SODIUM 5000 UNIT(S): 5000 INJECTION INTRAVENOUS; SUBCUTANEOUS at 22:24

## 2024-03-26 RX ADMIN — SODIUM CHLORIDE 75 MILLILITER(S): 9 INJECTION INTRAMUSCULAR; INTRAVENOUS; SUBCUTANEOUS at 01:05

## 2024-03-26 RX ADMIN — HEPARIN SODIUM 5000 UNIT(S): 5000 INJECTION INTRAVENOUS; SUBCUTANEOUS at 05:24

## 2024-03-26 NOTE — PATIENT PROFILE ADULT - FALL HARM RISK - RISK INTERVENTIONS

## 2024-03-26 NOTE — CONSULT NOTE ADULT - SUBJECTIVE AND OBJECTIVE BOX
[facility] DIVISION OF KIDNEY DISEASES AND HYPERTENSION -- INITIAL CONSULT NOTE  --------------------------------------------------------------------------------  HPI:  72 y/o F w/ PMH of uncontrolled NIDDM2, hypothyroidism, HTN, HLD, and chronic hyponatremia who presented to the ED after recent admission for symptomatic hyponatremia with complaints of generalized weakness and fatigue. Pt's son in law is at bedside providing collateral history. Pt was recently discharged from [facility] after being treated for symptomatic hyponatremia. At that time, sodium on admission was around 124, and patient was endorsing nausea and vomiting. She was also treated for a UTI and was ultimately discharged. Pt now returns with decreased PO intake, lethargy, congestion, and non-productive cough. Pt states she has not been eating as much during this time, and just snacking on things like olives and cheeses. Lives at home with daughter and daughter's family, and thinks the kids may have been around someone who is sick. Denies chest pain, palpitations, f/c/n/v/d/c, abd pain, and dysuria.     In ED, pt was found to be COVID positive. Sodium was 123.       PAST HISTORY  --------------------------------------------------------------------------------  PAST MEDICAL & SURGICAL HISTORY:  Benign essential HTN      HLD (hyperlipidemia)      Type 2 diabetes mellitus      Primary hypothyroidism      Hyponatremia      History of cataract extraction        FAMILY HISTORY:  FH: diabetes mellitus (Sibling, Sibling, Sibling, Sibling)      PAST SOCIAL HISTORY: lives at home    ALLERGIES & MEDICATIONS  --------------------------------------------------------------------------------  Allergies    No Known Allergies      Standing Inpatient Medications  amLODIPine   Tablet 10 milliGRAM(s) Oral daily  cefTRIAXone Injectable. 1000 milliGRAM(s) IV Push every 24 hours  dextrose 5%. 1000 milliLiter(s) IV Continuous <Continuous>  dextrose 5%. 1000 milliLiter(s) IV Continuous <Continuous>  dextrose 50% Injectable 25 Gram(s) IV Push once  dextrose 50% Injectable 12.5 Gram(s) IV Push once  dextrose 50% Injectable 25 Gram(s) IV Push once  glucagon  Injectable 1 milliGRAM(s) IntraMuscular once  heparin   Injectable 5000 Unit(s) SubCutaneous every 8 hours  insulin lispro (ADMELOG) corrective regimen sliding scale   SubCutaneous three times a day before meals  insulin lispro (ADMELOG) corrective regimen sliding scale   SubCutaneous at bedtime  levothyroxine 100 MICROGram(s) Oral <User Schedule>  levothyroxine 50 MICROGram(s) Oral <User Schedule>  simvastatin 10 milliGRAM(s) Oral at bedtime  sodium chloride 0.9%. 1000 milliLiter(s) IV Continuous <Continuous>    PRN Inpatient Medications  acetaminophen     Tablet .. 650 milliGRAM(s) Oral every 6 hours PRN  aluminum hydroxide/magnesium hydroxide/simethicone Suspension 30 milliLiter(s) Oral every 4 hours PRN  benzonatate 100 milliGRAM(s) Oral every 8 hours PRN  dextrose Oral Gel 15 Gram(s) Oral once PRN  guaifenesin/dextromethorphan Oral Liquid 10 milliLiter(s) Oral every 4 hours PRN  melatonin 3 milliGRAM(s) Oral at bedtime PRN  ondansetron Injectable 4 milliGRAM(s) IV Push every 8 hours PRN      REVIEW OF SYSTEMS  --------------------------------------------------------------------------------  Gen: No weight changes, fatigue, fevers/chills, weakness  Skin: No rashes  Head/Eyes/Ears/Mouth: No headache; Normal hearing; Normal vision w/o blurriness; No sinus pain/discomfort, sore throat  Respiratory: No dyspnea, cough, wheezing, hemoptysis  CV: No chest pain, PND, orthopnea  GI: No abdominal pain, diarrhea, constipation, nausea, vomiting, melena, hematochezia  : No increased frequency, dysuria, hematuria, nocturia  MSK: No joint pain/swelling; no back pain; no edema  Neuro: No dizziness/lightheadedness, weakness, seizures, numbness, tingling  Heme: No easy bruising or bleeding  Endo: No heat/cold intolerance  Psych: No significant nervousness, anxiety, stress, depression    All other systems were reviewed and are negative, except as noted.    VITALS/PHYSICAL EXAM  --------------------------------------------------------------------------------  T(C): 36.9 (03-26-24 @ 12:10), Max: 36.9 (03-26-24 @ 00:06)  HR: 71 (03-26-24 @ 12:10) (64 - 82)  BP: 141/65 (03-26-24 @ 12:10) (136/68 - 154/69)  RR: 18 (03-26-24 @ 12:10) (15 - 20)  SpO2: 97% (03-26-24 @ 12:10) (97% - 98%)  Wt(kg): --  Height (cm): 128.3 (03-25-24 @ 13:44)  Weight (kg): 44.1 (03-25-24 @ 13:44)  BMI (kg/m2): 26.8 (03-25-24 @ 13:44)  BSA (m2): 1.21 (03-25-24 @ 13:44)      Physical Exam:  	Gen: NAD, well-appearing  	HEENT: PERRL, supple neck, clear oropharynx  	Pulm: CTA B/L  	CV: RRR, S1S2; no rub  	Back: No spinal or CVA tenderness; no sacral edema  	Abd: +BS, soft, nontender/nondistended  	: No suprapubic tenderness  	UE: Warm, FROM, no clubbing, intact strength; no edema; no asterixis  	LE: Warm, FROM, no clubbing, intact strength; no edema  	Neuro: No focal deficits, intact gait  	Psych: Normal affect and mood  	Skin: Warm, without rashes  	Vascular access:    LABS/STUDIES  --------------------------------------------------------------------------------              9.4    7.14  >-----------<  358      [03-26-24 @ 04:18]              28.0     128  |  96  |  13.0  ----------------------------<  181      [03-26-24 @ 04:18]  4.3   |  22.0  |  0.48        Ca     7.5     [03-26-24 @ 04:18]      Mg     1.4     [03-26-24 @ 04:18]    TPro  6.0  /  Alb  3.6  /  TBili  <0.2  /  DBili  x   /  AST  13  /  ALT  28  /  AlkPhos  66  [03-25-24 @ 14:30]          Creatinine Trend:  SCr 0.48 [03-26 @ 04:18]  SCr 0.59 [03-25 @ 14:30]  SCr 0.67 [03-22 @ 10:46]  SCr 0.64 [03-19 @ 05:15]  SCr 0.56 [03-18 @ 10:34]    Urinalysis - [03-26-24 @ 04:18]      Color  / Appearance  / SG  / pH       Gluc 181 / Ketone   / Bili  / Urobili        Blood  / Protein  / Leuk Est  / Nitrite       RBC  / WBC  / Hyaline  / Gran  / Sq Epi  / Non Sq Epi  / Bacteria     Urine Creatinine 89      [03-25-24 @ 14:45]  Urine Sodium <30      [03-25-24 @ 14:45]  Urine Osmolality 627      [03-25-24 @ 14:45]    Vitamin D (25OH) 33.9      [12-18-23 @ 04:03]  TSH 7.65      [03-25-24 @ 14:30]  Lipid: chol 164, TG 39, HDL 53, LDL --      [12-17-23 @ 07:50]    HCV 0.14, Nonreact      [12-17-23 @ 07:50]     [facility] DIVISION OF KIDNEY DISEASES AND HYPERTENSION -- INITIAL CONSULT NOTE  --------------------------------------------------------------------------------  HPI:  72 y/o F w/ PMH of uncontrolled NIDDM2, hypothyroidism, HTN, HLD, and chronic hyponatremia who presented to the ED after recent admission for symptomatic hyponatremia with complaints of generalized weakness and fatigue. Pt's son in law is at bedside providing collateral history. Pt was recently discharged from [facility] after being treated for symptomatic hyponatremia. At that time, sodium on admission was around 124, and patient was endorsing nausea and vomiting. She was also treated for a UTI and was ultimately discharged. Pt now returns with decreased PO intake, lethargy, congestion, and non-productive cough. Pt states she has not been eating as much during this time, and just snacking on things like olives and cheeses. Lives at home with daughter and daughter's family, and thinks the kids may have been around someone who is sick. Denies chest pain, palpitations, f/c/n/v/d/c, abd pain, and dysuria.     In ED, pt was found to be COVID positive. Sodium was 123.   Pt seen and examined- feels well.  reports poor po intake at home.    PAST HISTORY  --------------------------------------------------------------------------------  PAST MEDICAL & SURGICAL HISTORY:  Benign essential HTN      HLD (hyperlipidemia)      Type 2 diabetes mellitus      Primary hypothyroidism      Hyponatremia      History of cataract extraction        FAMILY HISTORY:  FH: diabetes mellitus (Sibling, Sibling, Sibling, Sibling)      PAST SOCIAL HISTORY: lives at home    ALLERGIES & MEDICATIONS  --------------------------------------------------------------------------------  Allergies    No Known Allergies      Standing Inpatient Medications  amLODIPine   Tablet 10 milliGRAM(s) Oral daily  cefTRIAXone Injectable. 1000 milliGRAM(s) IV Push every 24 hours  dextrose 5%. 1000 milliLiter(s) IV Continuous <Continuous>  dextrose 5%. 1000 milliLiter(s) IV Continuous <Continuous>  dextrose 50% Injectable 25 Gram(s) IV Push once  dextrose 50% Injectable 12.5 Gram(s) IV Push once  dextrose 50% Injectable 25 Gram(s) IV Push once  glucagon  Injectable 1 milliGRAM(s) IntraMuscular once  heparin   Injectable 5000 Unit(s) SubCutaneous every 8 hours  insulin lispro (ADMELOG) corrective regimen sliding scale   SubCutaneous three times a day before meals  insulin lispro (ADMELOG) corrective regimen sliding scale   SubCutaneous at bedtime  levothyroxine 100 MICROGram(s) Oral <User Schedule>  levothyroxine 50 MICROGram(s) Oral <User Schedule>  simvastatin 10 milliGRAM(s) Oral at bedtime  sodium chloride 0.9%. 1000 milliLiter(s) IV Continuous <Continuous>    PRN Inpatient Medications  acetaminophen     Tablet .. 650 milliGRAM(s) Oral every 6 hours PRN  aluminum hydroxide/magnesium hydroxide/simethicone Suspension 30 milliLiter(s) Oral every 4 hours PRN  benzonatate 100 milliGRAM(s) Oral every 8 hours PRN  dextrose Oral Gel 15 Gram(s) Oral once PRN  guaifenesin/dextromethorphan Oral Liquid 10 milliLiter(s) Oral every 4 hours PRN  melatonin 3 milliGRAM(s) Oral at bedtime PRN  ondansetron Injectable 4 milliGRAM(s) IV Push every 8 hours PRN      REVIEW OF SYSTEMS  --------------------------------------------------------------------------------  Gen: No weight changes, fatigue, fevers/chills, weakness  Skin: No rashes  Head/Eyes/Ears/Mouth: No headache; Normal hearing; Normal vision w/o blurriness; No sinus pain/discomfort, sore throat  Respiratory: No dyspnea, cough, wheezing, hemoptysis  CV: No chest pain, PND, orthopnea  GI: No abdominal pain, diarrhea, constipation, nausea, vomiting, melena, hematochezia  : No increased frequency, dysuria, hematuria, nocturia  MSK: No joint pain/swelling; no back pain; no edema  Neuro: No dizziness/lightheadedness, weakness, seizures, numbness, tingling  Heme: No easy bruising or bleeding  Endo: No heat/cold intolerance  Psych: No significant nervousness, anxiety, stress, depression    All other systems were reviewed and are negative, except as noted.    VITALS/PHYSICAL EXAM  --------------------------------------------------------------------------------  T(C): 36.9 (03-26-24 @ 12:10), Max: 36.9 (03-26-24 @ 00:06)  HR: 71 (03-26-24 @ 12:10) (64 - 82)  BP: 141/65 (03-26-24 @ 12:10) (136/68 - 154/69)  RR: 18 (03-26-24 @ 12:10) (15 - 20)  SpO2: 97% (03-26-24 @ 12:10) (97% - 98%)  Wt(kg): --  Height (cm): 128.3 (03-25-24 @ 13:44)  Weight (kg): 44.1 (03-25-24 @ 13:44)  BMI (kg/m2): 26.8 (03-25-24 @ 13:44)  BSA (m2): 1.21 (03-25-24 @ 13:44)      Physical Exam:  	Gen: NAD  	HEENT: supple neck,  	Pulm: CTA B/L  	CV: RRR, S1S2; no rub  	Abd: +BS, soft, nontender/nondistended  	: No suprapubic tenderness  	UE: Warm,  no edema;  	LE: Warm no edema  	Neuro: No focal deficit  	Psych: Normal affect and mood  	Skin: Warm,    LABS/STUDIES  --------------------------------------------------------------------------------              9.4    7.14  >-----------<  358      [03-26-24 @ 04:18]              28.0     128  |  96  |  13.0  ----------------------------<  181      [03-26-24 @ 04:18]  4.3   |  22.0  |  0.48        Ca     7.5     [03-26-24 @ 04:18]      Mg     1.4     [03-26-24 @ 04:18]    TPro  6.0  /  Alb  3.6  /  TBili  <0.2  /  DBili  x   /  AST  13  /  ALT  28  /  AlkPhos  66  [03-25-24 @ 14:30]          Creatinine Trend:  SCr 0.48 [03-26 @ 04:18]  SCr 0.59 [03-25 @ 14:30]  SCr 0.67 [03-22 @ 10:46]  SCr 0.64 [03-19 @ 05:15]  SCr 0.56 [03-18 @ 10:34]    Urinalysis - [03-26-24 @ 04:18]      Color  / Appearance  / SG  / pH       Gluc 181 / Ketone   / Bili  / Urobili        Blood  / Protein  / Leuk Est  / Nitrite       RBC  / WBC  / Hyaline  / Gran  / Sq Epi  / Non Sq Epi  / Bacteria     Urine Creatinine 89      [03-25-24 @ 14:45]  Urine Sodium <30      [03-25-24 @ 14:45]  Urine Osmolality 627      [03-25-24 @ 14:45]    Vitamin D (25OH) 33.9      [12-18-23 @ 04:03]  TSH 7.65      [03-25-24 @ 14:30]  Lipid: chol 164, TG 39, HDL 53, LDL --      [12-17-23 @ 07:50]    HCV 0.14, Nonreact      [12-17-23 @ 07:50]

## 2024-03-26 NOTE — PATIENT PROFILE ADULT - NSFALLSECTIONLABEL_GEN_A_CORE
Patient left clinic stating she could not wait any longer to see the doctor because she had another appointment to go to.   .

## 2024-03-26 NOTE — CONSULT NOTE ADULT - ASSESSMENT
74 y/o F w/ PMH of uncontrolled NIDDM2, hypothyroidism, HTN, HLD, and chronic hyponatremia who presented to the ED after recent admission for symptomatic hyponatremia with complaints of generalized weakness and fatigue.    ·Hyponatremia multifactorial in setting of poor p.o. intake, osmotic diuresis in setting of hyperglycemia   Serum sodium 126 (corrected for hyperglycemia) on presentation- 133 on recent hospital discharge  SNa+ 130 this Am ( corrected)  UA dirty- Uosm ~ 627, Misty+ < 30  Continue IV hydration - goal correction ~ 4-6 meq/ 24 hours  Encourage po solute intake  Needs adequate blood sugar control

## 2024-03-27 LAB
ANION GAP SERPL CALC-SCNC: 11 MMOL/L — SIGNIFICANT CHANGE UP (ref 5–17)
ANION GAP SERPL CALC-SCNC: 8 MMOL/L — SIGNIFICANT CHANGE UP (ref 5–17)
BUN SERPL-MCNC: 10.4 MG/DL — SIGNIFICANT CHANGE UP (ref 8–20)
BUN SERPL-MCNC: 19 MG/DL — SIGNIFICANT CHANGE UP (ref 8–20)
CALCIUM SERPL-MCNC: 8.4 MG/DL — SIGNIFICANT CHANGE UP (ref 8.4–10.5)
CALCIUM SERPL-MCNC: 8.7 MG/DL — SIGNIFICANT CHANGE UP (ref 8.4–10.5)
CHLORIDE SERPL-SCNC: 95 MMOL/L — LOW (ref 96–108)
CHLORIDE SERPL-SCNC: 96 MMOL/L — SIGNIFICANT CHANGE UP (ref 96–108)
CO2 SERPL-SCNC: 24 MMOL/L — SIGNIFICANT CHANGE UP (ref 22–29)
CO2 SERPL-SCNC: 27 MMOL/L — SIGNIFICANT CHANGE UP (ref 22–29)
CREAT SERPL-MCNC: 0.59 MG/DL — SIGNIFICANT CHANGE UP (ref 0.5–1.3)
CREAT SERPL-MCNC: 0.72 MG/DL — SIGNIFICANT CHANGE UP (ref 0.5–1.3)
CULTURE RESULTS: SIGNIFICANT CHANGE UP
EGFR: 87 ML/MIN/1.73M2 — SIGNIFICANT CHANGE UP
EGFR: 94 ML/MIN/1.73M2 — SIGNIFICANT CHANGE UP
GLUCOSE BLDC GLUCOMTR-MCNC: 158 MG/DL — HIGH (ref 70–99)
GLUCOSE BLDC GLUCOMTR-MCNC: 203 MG/DL — HIGH (ref 70–99)
GLUCOSE BLDC GLUCOMTR-MCNC: 210 MG/DL — HIGH (ref 70–99)
GLUCOSE BLDC GLUCOMTR-MCNC: 239 MG/DL — HIGH (ref 70–99)
GLUCOSE SERPL-MCNC: 192 MG/DL — HIGH (ref 70–99)
GLUCOSE SERPL-MCNC: 208 MG/DL — HIGH (ref 70–99)
POTASSIUM SERPL-MCNC: 4.5 MMOL/L — SIGNIFICANT CHANGE UP (ref 3.5–5.3)
POTASSIUM SERPL-MCNC: 4.7 MMOL/L — SIGNIFICANT CHANGE UP (ref 3.5–5.3)
POTASSIUM SERPL-SCNC: 4.5 MMOL/L — SIGNIFICANT CHANGE UP (ref 3.5–5.3)
POTASSIUM SERPL-SCNC: 4.7 MMOL/L — SIGNIFICANT CHANGE UP (ref 3.5–5.3)
SODIUM SERPL-SCNC: 130 MMOL/L — LOW (ref 135–145)
SODIUM SERPL-SCNC: 131 MMOL/L — LOW (ref 135–145)
SPECIMEN SOURCE: SIGNIFICANT CHANGE UP

## 2024-03-27 PROCEDURE — 99232 SBSQ HOSP IP/OBS MODERATE 35: CPT

## 2024-03-27 RX ADMIN — HEPARIN SODIUM 5000 UNIT(S): 5000 INJECTION INTRAVENOUS; SUBCUTANEOUS at 21:26

## 2024-03-27 RX ADMIN — AMLODIPINE BESYLATE 10 MILLIGRAM(S): 2.5 TABLET ORAL at 05:20

## 2024-03-27 RX ADMIN — HEPARIN SODIUM 5000 UNIT(S): 5000 INJECTION INTRAVENOUS; SUBCUTANEOUS at 14:00

## 2024-03-27 RX ADMIN — HEPARIN SODIUM 5000 UNIT(S): 5000 INJECTION INTRAVENOUS; SUBCUTANEOUS at 05:19

## 2024-03-27 RX ADMIN — SIMVASTATIN 10 MILLIGRAM(S): 20 TABLET, FILM COATED ORAL at 21:26

## 2024-03-27 RX ADMIN — Medication 50 MICROGRAM(S): at 05:21

## 2024-03-27 RX ADMIN — Medication 4: at 12:10

## 2024-03-27 RX ADMIN — CEFTRIAXONE 1000 MILLIGRAM(S): 500 INJECTION, POWDER, FOR SOLUTION INTRAMUSCULAR; INTRAVENOUS at 17:55

## 2024-03-27 RX ADMIN — Medication 4: at 16:52

## 2024-03-27 RX ADMIN — Medication 4: at 08:14

## 2024-03-28 ENCOUNTER — TRANSCRIPTION ENCOUNTER (OUTPATIENT)
Age: 74
End: 2024-03-28

## 2024-03-28 ENCOUNTER — APPOINTMENT (OUTPATIENT)
Dept: NEPHROLOGY | Facility: CLINIC | Age: 74
End: 2024-03-28

## 2024-03-28 VITALS
DIASTOLIC BLOOD PRESSURE: 74 MMHG | OXYGEN SATURATION: 96 % | TEMPERATURE: 99 F | RESPIRATION RATE: 18 BRPM | SYSTOLIC BLOOD PRESSURE: 157 MMHG | HEART RATE: 71 BPM

## 2024-03-28 LAB
ANION GAP SERPL CALC-SCNC: 10 MMOL/L — SIGNIFICANT CHANGE UP (ref 5–17)
ANION GAP SERPL CALC-SCNC: 10 MMOL/L — SIGNIFICANT CHANGE UP (ref 5–17)
ANION GAP SERPL CALC-SCNC: 13 MMOL/L — SIGNIFICANT CHANGE UP (ref 5–17)
BUN SERPL-MCNC: 18.3 MG/DL — SIGNIFICANT CHANGE UP (ref 8–20)
BUN SERPL-MCNC: 19.3 MG/DL — SIGNIFICANT CHANGE UP (ref 8–20)
BUN SERPL-MCNC: 20.4 MG/DL — HIGH (ref 8–20)
CALCIUM SERPL-MCNC: 8.2 MG/DL — LOW (ref 8.4–10.5)
CALCIUM SERPL-MCNC: 8.6 MG/DL — SIGNIFICANT CHANGE UP (ref 8.4–10.5)
CALCIUM SERPL-MCNC: 8.6 MG/DL — SIGNIFICANT CHANGE UP (ref 8.4–10.5)
CHLORIDE SERPL-SCNC: 93 MMOL/L — LOW (ref 96–108)
CHLORIDE SERPL-SCNC: 94 MMOL/L — LOW (ref 96–108)
CHLORIDE SERPL-SCNC: 95 MMOL/L — LOW (ref 96–108)
CO2 SERPL-SCNC: 22 MMOL/L — SIGNIFICANT CHANGE UP (ref 22–29)
CO2 SERPL-SCNC: 24 MMOL/L — SIGNIFICANT CHANGE UP (ref 22–29)
CO2 SERPL-SCNC: 25 MMOL/L — SIGNIFICANT CHANGE UP (ref 22–29)
CREAT SERPL-MCNC: 0.53 MG/DL — SIGNIFICANT CHANGE UP (ref 0.5–1.3)
CREAT SERPL-MCNC: 0.56 MG/DL — SIGNIFICANT CHANGE UP (ref 0.5–1.3)
CREAT SERPL-MCNC: 0.75 MG/DL — SIGNIFICANT CHANGE UP (ref 0.5–1.3)
EGFR: 83 ML/MIN/1.73M2 — SIGNIFICANT CHANGE UP
EGFR: 95 ML/MIN/1.73M2 — SIGNIFICANT CHANGE UP
EGFR: 96 ML/MIN/1.73M2 — SIGNIFICANT CHANGE UP
GLUCOSE BLDC GLUCOMTR-MCNC: 158 MG/DL — HIGH (ref 70–99)
GLUCOSE BLDC GLUCOMTR-MCNC: 187 MG/DL — HIGH (ref 70–99)
GLUCOSE BLDC GLUCOMTR-MCNC: 240 MG/DL — HIGH (ref 70–99)
GLUCOSE SERPL-MCNC: 159 MG/DL — HIGH (ref 70–99)
GLUCOSE SERPL-MCNC: 201 MG/DL — HIGH (ref 70–99)
GLUCOSE SERPL-MCNC: 254 MG/DL — HIGH (ref 70–99)
HCT VFR BLD CALC: 31.2 % — LOW (ref 34.5–45)
HGB BLD-MCNC: 10.6 G/DL — LOW (ref 11.5–15.5)
MCHC RBC-ENTMCNC: 26.6 PG — LOW (ref 27–34)
MCHC RBC-ENTMCNC: 34 GM/DL — SIGNIFICANT CHANGE UP (ref 32–36)
MCV RBC AUTO: 78.4 FL — LOW (ref 80–100)
PLATELET # BLD AUTO: 376 K/UL — SIGNIFICANT CHANGE UP (ref 150–400)
POTASSIUM SERPL-MCNC: 4.4 MMOL/L — SIGNIFICANT CHANGE UP (ref 3.5–5.3)
POTASSIUM SERPL-MCNC: 4.5 MMOL/L — SIGNIFICANT CHANGE UP (ref 3.5–5.3)
POTASSIUM SERPL-MCNC: 4.6 MMOL/L — SIGNIFICANT CHANGE UP (ref 3.5–5.3)
POTASSIUM SERPL-SCNC: 4.4 MMOL/L — SIGNIFICANT CHANGE UP (ref 3.5–5.3)
POTASSIUM SERPL-SCNC: 4.5 MMOL/L — SIGNIFICANT CHANGE UP (ref 3.5–5.3)
POTASSIUM SERPL-SCNC: 4.6 MMOL/L — SIGNIFICANT CHANGE UP (ref 3.5–5.3)
RBC # BLD: 3.98 M/UL — SIGNIFICANT CHANGE UP (ref 3.8–5.2)
RBC # FLD: 13.2 % — SIGNIFICANT CHANGE UP (ref 10.3–14.5)
SODIUM SERPL-SCNC: 127 MMOL/L — LOW (ref 135–145)
SODIUM SERPL-SCNC: 129 MMOL/L — LOW (ref 135–145)
SODIUM SERPL-SCNC: 130 MMOL/L — LOW (ref 135–145)
WBC # BLD: 6.43 K/UL — SIGNIFICANT CHANGE UP (ref 3.8–10.5)
WBC # FLD AUTO: 6.43 K/UL — SIGNIFICANT CHANGE UP (ref 3.8–10.5)

## 2024-03-28 PROCEDURE — 96374 THER/PROPH/DIAG INJ IV PUSH: CPT

## 2024-03-28 PROCEDURE — 83735 ASSAY OF MAGNESIUM: CPT

## 2024-03-28 PROCEDURE — 80048 BASIC METABOLIC PNL TOTAL CA: CPT

## 2024-03-28 PROCEDURE — 71045 X-RAY EXAM CHEST 1 VIEW: CPT

## 2024-03-28 PROCEDURE — 81001 URINALYSIS AUTO W/SCOPE: CPT

## 2024-03-28 PROCEDURE — 84443 ASSAY THYROID STIM HORMONE: CPT

## 2024-03-28 PROCEDURE — 85025 COMPLETE CBC W/AUTO DIFF WBC: CPT

## 2024-03-28 PROCEDURE — 87637 SARSCOV2&INF A&B&RSV AMP PRB: CPT

## 2024-03-28 PROCEDURE — 87040 BLOOD CULTURE FOR BACTERIA: CPT

## 2024-03-28 PROCEDURE — 36415 COLL VENOUS BLD VENIPUNCTURE: CPT

## 2024-03-28 PROCEDURE — 99233 SBSQ HOSP IP/OBS HIGH 50: CPT

## 2024-03-28 PROCEDURE — 82570 ASSAY OF URINE CREATININE: CPT

## 2024-03-28 PROCEDURE — 80053 COMPREHEN METABOLIC PANEL: CPT

## 2024-03-28 PROCEDURE — 82962 GLUCOSE BLOOD TEST: CPT

## 2024-03-28 PROCEDURE — 93005 ELECTROCARDIOGRAM TRACING: CPT

## 2024-03-28 PROCEDURE — 99285 EMERGENCY DEPT VISIT HI MDM: CPT | Mod: 25

## 2024-03-28 PROCEDURE — 97163 PT EVAL HIGH COMPLEX 45 MIN: CPT

## 2024-03-28 PROCEDURE — 83935 ASSAY OF URINE OSMOLALITY: CPT

## 2024-03-28 PROCEDURE — 85027 COMPLETE CBC AUTOMATED: CPT

## 2024-03-28 PROCEDURE — 87086 URINE CULTURE/COLONY COUNT: CPT

## 2024-03-28 PROCEDURE — 99239 HOSP IP/OBS DSCHRG MGMT >30: CPT

## 2024-03-28 PROCEDURE — 70450 CT HEAD/BRAIN W/O DYE: CPT | Mod: MC

## 2024-03-28 PROCEDURE — 84300 ASSAY OF URINE SODIUM: CPT

## 2024-03-28 RX ORDER — LEVOTHYROXINE SODIUM 125 MCG
1 TABLET ORAL
Refills: 0 | DISCHARGE

## 2024-03-28 RX ORDER — LEVOTHYROXINE SODIUM 125 MCG
1 TABLET ORAL
Qty: 0 | Refills: 0 | DISCHARGE
Start: 2024-03-28

## 2024-03-28 RX ORDER — SODIUM CHLORIDE 9 MG/ML
1 INJECTION INTRAMUSCULAR; INTRAVENOUS; SUBCUTANEOUS
Refills: 0 | Status: DISCONTINUED | OUTPATIENT
Start: 2024-03-28 | End: 2024-03-28

## 2024-03-28 RX ORDER — SIMVASTATIN 20 MG/1
1 TABLET, FILM COATED ORAL
Qty: 0 | Refills: 0 | DISCHARGE
Start: 2024-03-28

## 2024-03-28 RX ORDER — NITROFURANTOIN MACROCRYSTAL 50 MG
1 CAPSULE ORAL
Qty: 6 | Refills: 0
Start: 2024-03-28 | End: 2024-03-30

## 2024-03-28 RX ORDER — AMLODIPINE BESYLATE 2.5 MG/1
1 TABLET ORAL
Refills: 0 | DISCHARGE

## 2024-03-28 RX ORDER — SODIUM CHLORIDE 9 MG/ML
1000 INJECTION INTRAMUSCULAR; INTRAVENOUS; SUBCUTANEOUS
Refills: 0 | Status: DISCONTINUED | OUTPATIENT
Start: 2024-03-28 | End: 2024-03-28

## 2024-03-28 RX ORDER — AMLODIPINE BESYLATE 2.5 MG/1
1 TABLET ORAL
Qty: 0 | Refills: 0 | DISCHARGE
Start: 2024-03-28

## 2024-03-28 RX ORDER — SODIUM CHLORIDE 9 MG/ML
1 INJECTION INTRAMUSCULAR; INTRAVENOUS; SUBCUTANEOUS
Qty: 60 | Refills: 0
Start: 2024-03-28 | End: 2024-04-26

## 2024-03-28 RX ADMIN — AMLODIPINE BESYLATE 10 MILLIGRAM(S): 2.5 TABLET ORAL at 05:06

## 2024-03-28 RX ADMIN — HEPARIN SODIUM 5000 UNIT(S): 5000 INJECTION INTRAVENOUS; SUBCUTANEOUS at 14:12

## 2024-03-28 RX ADMIN — SODIUM CHLORIDE 75 MILLILITER(S): 9 INJECTION INTRAMUSCULAR; INTRAVENOUS; SUBCUTANEOUS at 10:02

## 2024-03-28 RX ADMIN — Medication 2: at 07:49

## 2024-03-28 RX ADMIN — Medication 2: at 16:35

## 2024-03-28 RX ADMIN — Medication 4: at 11:18

## 2024-03-28 RX ADMIN — SODIUM CHLORIDE 100 MILLILITER(S): 9 INJECTION INTRAMUSCULAR; INTRAVENOUS; SUBCUTANEOUS at 15:18

## 2024-03-28 RX ADMIN — HEPARIN SODIUM 5000 UNIT(S): 5000 INJECTION INTRAVENOUS; SUBCUTANEOUS at 05:06

## 2024-03-28 RX ADMIN — Medication 100 MICROGRAM(S): at 05:06

## 2024-03-28 RX ADMIN — CEFTRIAXONE 1000 MILLIGRAM(S): 500 INJECTION, POWDER, FOR SOLUTION INTRAMUSCULAR; INTRAVENOUS at 17:12

## 2024-03-28 RX ADMIN — SODIUM CHLORIDE 1 GRAM(S): 9 INJECTION INTRAMUSCULAR; INTRAVENOUS; SUBCUTANEOUS at 14:13

## 2024-03-28 NOTE — PROGRESS NOTE ADULT - SUBJECTIVE AND OBJECTIVE BOX
[facility] Division of Hospital Medicine    SUBJECTIVE / OVERNIGHT EVENTS:    Patient denies chest pain, SOB, abd pain, N/V, fever, chills, dysuria or any other complaints. All remainder ROS negative.     MEDICATIONS  (STANDING):  amLODIPine   Tablet 10 milliGRAM(s) Oral daily  cefTRIAXone Injectable. 1000 milliGRAM(s) IV Push every 24 hours  dextrose 5%. 1000 milliLiter(s) (50 mL/Hr) IV Continuous <Continuous>  dextrose 5%. 1000 milliLiter(s) (100 mL/Hr) IV Continuous <Continuous>  dextrose 50% Injectable 12.5 Gram(s) IV Push once  dextrose 50% Injectable 25 Gram(s) IV Push once  dextrose 50% Injectable 25 Gram(s) IV Push once  glucagon  Injectable 1 milliGRAM(s) IntraMuscular once  heparin   Injectable 5000 Unit(s) SubCutaneous every 8 hours  insulin lispro (ADMELOG) corrective regimen sliding scale   SubCutaneous at bedtime  insulin lispro (ADMELOG) corrective regimen sliding scale   SubCutaneous three times a day before meals  levothyroxine 100 MICROGram(s) Oral <User Schedule>  levothyroxine 50 MICROGram(s) Oral <User Schedule>  simvastatin 10 milliGRAM(s) Oral at bedtime  sodium chloride 0.9%. 1000 milliLiter(s) (75 mL/Hr) IV Continuous <Continuous>    MEDICATIONS  (PRN):  acetaminophen     Tablet .. 650 milliGRAM(s) Oral every 6 hours PRN Temp greater or equal to 38C (100.4F), Mild Pain (1 - 3)  aluminum hydroxide/magnesium hydroxide/simethicone Suspension 30 milliLiter(s) Oral every 4 hours PRN Dyspepsia  benzonatate 100 milliGRAM(s) Oral every 8 hours PRN Cough  dextrose Oral Gel 15 Gram(s) Oral once PRN Blood Glucose LESS THAN 70 milliGRAM(s)/deciliter  guaifenesin/dextromethorphan Oral Liquid 10 milliLiter(s) Oral every 4 hours PRN Cough  melatonin 3 milliGRAM(s) Oral at bedtime PRN Insomnia  ondansetron Injectable 4 milliGRAM(s) IV Push every 8 hours PRN Nausea and/or Vomiting        I&O's Summary      PHYSICAL EXAM:  Vital Signs Last 24 Hrs  T(C): 36.8 (27 Mar 2024 12:23), Max: 36.8 (27 Mar 2024 12:23)  T(F): 98.3 (27 Mar 2024 12:23), Max: 98.3 (27 Mar 2024 12:23)  HR: 69 (27 Mar 2024 12:23) (62 - 81)  BP: 138/68 (27 Mar 2024 12:23) (138/68 - 160/68)  BP(mean): --  RR: 18 (27 Mar 2024 12:23) (18 - 18)  SpO2: 97% (27 Mar 2024 12:23) (97% - 98%)    Parameters below as of 27 Mar 2024 12:23  Patient On (Oxygen Delivery Method): room air          General: Age-appearing, in no acute distress  Head: Normocephalic, atraumatic  ENMT: EOMI, no scleral icterus, neck supple, no JVD  Cardiovascular: +S1, S2; Regular rate and rhythm, no murmurs.  Respiratory: CTAB, no wheezing, no rales, no rhonchi  Gastrointestinal: soft, ND, NT, (+) BS, (-) rebound  Neuro: AAOx3, CN2-12 intact, no FND  Musculoskeletal: Normal tone, no deformities  Skin: Warm, dry, no rash, no jaundice  Psych: Calm, cooperative     LABS:                        9.4    7.14  )-----------( 358      ( 26 Mar 2024 04:18 )             28.0     03-27    131<L>  |  96  |  10.4  ----------------------------<  208<H>  4.5   |  27.0  |  0.59    Ca    8.7      27 Mar 2024 10:53  Mg     1.4     03-26            Urinalysis Basic - ( 27 Mar 2024 10:53 )    Color: x / Appearance: x / SG: x / pH: x  Gluc: 208 mg/dL / Ketone: x  / Bili: x / Urobili: x   Blood: x / Protein: x / Nitrite: x   Leuk Esterase: x / RBC: x / WBC x   Sq Epi: x / Non Sq Epi: x / Bacteria: x        Culture - Urine (collected 26 Mar 2024 01:15)  Source: Clean Catch Clean Catch (Midstream)  Final Report (27 Mar 2024 07:32):    <10,000 CFU/mL Normal Urogenital Kathleen    Culture - Blood (collected 25 Mar 2024 14:40)  Source: .Blood Blood-Venous  Preliminary Report (26 Mar 2024 22:02):    No growth at 24 hours    Culture - Blood (collected 25 Mar 2024 14:30)  Source: .Blood Blood-Venous  Preliminary Report (26 Mar 2024 22:02):    No growth at 24 hours      CAPILLARY BLOOD GLUCOSE      POCT Blood Glucose.: 203 mg/dL (27 Mar 2024 12:05)  POCT Blood Glucose.: 239 mg/dL (27 Mar 2024 08:11)  POCT Blood Glucose.: 77 mg/dL (26 Mar 2024 22:22)  POCT Blood Glucose.: 318 mg/dL (26 Mar 2024 18:07)        RADIOLOGY & ADDITIONAL TESTS:  Results Reviewed:   Imaging Personally Reviewed:  Electrocardiogram Personally Reviewed:                                          
[facility] Division of Hospital Medicine    SUBJECTIVE / OVERNIGHT EVENTS:    Patient denies chest pain, SOB, abd pain, N/V, fever, chills, dysuria or any other complaints. All remainder ROS negative.     MEDICATIONS  (STANDING):  amLODIPine   Tablet 10 milliGRAM(s) Oral daily  cefTRIAXone Injectable. 1000 milliGRAM(s) IV Push every 24 hours  dextrose 5%. 1000 milliLiter(s) (50 mL/Hr) IV Continuous <Continuous>  dextrose 5%. 1000 milliLiter(s) (100 mL/Hr) IV Continuous <Continuous>  dextrose 50% Injectable 12.5 Gram(s) IV Push once  dextrose 50% Injectable 25 Gram(s) IV Push once  dextrose 50% Injectable 25 Gram(s) IV Push once  glucagon  Injectable 1 milliGRAM(s) IntraMuscular once  heparin   Injectable 5000 Unit(s) SubCutaneous every 8 hours  insulin lispro (ADMELOG) corrective regimen sliding scale   SubCutaneous at bedtime  insulin lispro (ADMELOG) corrective regimen sliding scale   SubCutaneous three times a day before meals  levothyroxine 100 MICROGram(s) Oral <User Schedule>  levothyroxine 50 MICROGram(s) Oral <User Schedule>  simvastatin 10 milliGRAM(s) Oral at bedtime  sodium chloride 1 Gram(s) Oral two times a day  sodium chloride 0.9%. 1000 milliLiter(s) (100 mL/Hr) IV Continuous <Continuous>    MEDICATIONS  (PRN):  acetaminophen     Tablet .. 650 milliGRAM(s) Oral every 6 hours PRN Temp greater or equal to 38C (100.4F), Mild Pain (1 - 3)  aluminum hydroxide/magnesium hydroxide/simethicone Suspension 30 milliLiter(s) Oral every 4 hours PRN Dyspepsia  benzonatate 100 milliGRAM(s) Oral every 8 hours PRN Cough  dextrose Oral Gel 15 Gram(s) Oral once PRN Blood Glucose LESS THAN 70 milliGRAM(s)/deciliter  guaifenesin/dextromethorphan Oral Liquid 10 milliLiter(s) Oral every 4 hours PRN Cough  melatonin 3 milliGRAM(s) Oral at bedtime PRN Insomnia  ondansetron Injectable 4 milliGRAM(s) IV Push every 8 hours PRN Nausea and/or Vomiting        I&O's Summary      PHYSICAL EXAM:  Vital Signs Last 24 Hrs  T(C): 36.5 (28 Mar 2024 04:49), Max: 36.5 (27 Mar 2024 17:01)  T(F): 97.7 (28 Mar 2024 04:49), Max: 97.7 (27 Mar 2024 17:01)  HR: 70 (28 Mar 2024 05:01) (65 - 73)  BP: 157/81 (28 Mar 2024 05:01) (157/81 - 184/76)  BP(mean): --  RR: 17 (28 Mar 2024 04:49) (17 - 17)  SpO2: 99% (28 Mar 2024 04:49) (95% - 99%)    Parameters below as of 28 Mar 2024 04:49  Patient On (Oxygen Delivery Method): room air            General: Age-appearing, in no acute distress  Head: Normocephalic, atraumatic  ENMT: EOMI, no scleral icterus, neck supple, no JVD  Cardiovascular: +S1, S2; Regular rate and rhythm, no murmurs.  Respiratory: CTAB, no wheezing, no rales, no rhonchi  Gastrointestinal: soft, ND, NT, (+) BS, (-) rebound  Neuro: AAOx3, CN2-12 intact, no FND  Musculoskeletal: Normal tone, no deformities  Skin: Warm, dry, no rash, no jaundice  Psych: Calm, cooperative     LABS:                        10.6   6.43  )-----------( 376      ( 28 Mar 2024 04:42 )             31.2     03-28    127<L>  |  93<L>  |  18.3  ----------------------------<  254<H>  4.4   |  24.0  |  0.56    Ca    8.6      28 Mar 2024 11:31            Urinalysis Basic - ( 28 Mar 2024 11:31 )    Color: x / Appearance: x / SG: x / pH: x  Gluc: 254 mg/dL / Ketone: x  / Bili: x / Urobili: x   Blood: x / Protein: x / Nitrite: x   Leuk Esterase: x / RBC: x / WBC x   Sq Epi: x / Non Sq Epi: x / Bacteria: x        Culture - Urine (collected 26 Mar 2024 01:15)  Source: Clean Catch Clean Catch (Midstream)  Final Report (27 Mar 2024 07:32):    <10,000 CFU/mL Normal Urogenital Kathleen    Culture - Blood (collected 25 Mar 2024 14:40)  Source: .Blood Blood-Venous  Preliminary Report (27 Mar 2024 22:01):    No growth at 48 Hours    Culture - Blood (collected 25 Mar 2024 14:30)  Source: .Blood Blood-Venous  Preliminary Report (27 Mar 2024 22:01):    No growth at 48 Hours      CAPILLARY BLOOD GLUCOSE      POCT Blood Glucose.: 240 mg/dL (28 Mar 2024 11:17)  POCT Blood Glucose.: 187 mg/dL (28 Mar 2024 07:46)  POCT Blood Glucose.: 158 mg/dL (27 Mar 2024 21:24)  POCT Blood Glucose.: 210 mg/dL (27 Mar 2024 16:41)        RADIOLOGY & ADDITIONAL TESTS:  Results Reviewed:   Imaging Personally Reviewed:  Electrocardiogram Personally Reviewed:                                          
ELADIO DAIS Patient is a 73y old  Female who presents with a chief complaint of Hyponatremia, UTI (26 Mar 2024 12:11)     HPI:  72 y/o F w/ PMH of uncontrolled NIDDM2, hypothyroidism, HTN, HLD, and chronic hyponatremia who presented to the ED after recent admission for symptomatic hyponatremia with complaints of generalized weakness and fatigue. Pt's son in law is at bedside providing collateral history. Pt was recently discharged from [facility] after being treated for symptomatic hyponatremia. At that time, sodium on admission was around 124, and patient was endorsing nausea and vomiting. She was also treated for a UTI and was ultimately discharged. Pt now returns with decreased PO intake, lethargy, congestion, and non-productive cough. Pt states she has not been eating as much during this time, and just snacking on things like olives and cheeses. Lives at home with daughter and daughter's family, and thinks the kids may have been around someone who is sick. Denies chest pain, palpitations, f/c/n/v/d/c, abd pain, and dysuria.     In ED, pt was found to be COVID positive. Sodium was 123.  (25 Mar 2024 21:49)    The patient was seen and evaluated sitting in chair- offers no complaints - has 5 water s on her table - discussed lowering her water intake - daughter on phone states he is told to take one liter a day and any liquid over that to have tea juice - corrected the undertstandin  The patient is in no acute distress.  Denied any fever chest pain, palpitations, shortness of breath, abdominal pain, fever, dysuria, cough, edema       I&O's Summary    Allergies    No Known Allergies    Intolerances      HEALTH ISSUES - PROBLEM Dx:        PAST MEDICAL & SURGICAL HISTORY:  Benign essential HTN      HLD (hyperlipidemia)      Type 2 diabetes mellitus      Primary hypothyroidism      Hyponatremia      History of cataract extraction              Vital Signs Last 24 Hrs  T(C): 36.9 (26 Mar 2024 12:10), Max: 36.9 (26 Mar 2024 00:06)  T(F): 98.5 (26 Mar 2024 12:10), Max: 98.5 (26 Mar 2024 00:06)  HR: 71 (26 Mar 2024 12:10) (64 - 82)  BP: 141/65 (26 Mar 2024 12:10) (136/68 - 154/69)  BP(mean): --  RR: 18 (26 Mar 2024 12:10) (15 - 20)  SpO2: 97% (26 Mar 2024 12:10) (97% - 98%)    Parameters below as of 26 Mar 2024 12:10  Patient On (Oxygen Delivery Method): room air    T(C): 36.9 (03-26-24 @ 12:10), Max: 36.9 (03-26-24 @ 00:06)  HR: 71 (03-26-24 @ 12:10) (64 - 82)  BP: 141/65 (03-26-24 @ 12:10) (136/68 - 154/69)  RR: 18 (03-26-24 @ 12:10) (15 - 20)  SpO2: 97% (03-26-24 @ 12:10) (97% - 98%)  Wt(kg): --    PHYSICAL EXAM:    GENERAL: NAD sitting up conversing pleasant drinking water talking on phone with daughter  HEAD:  Atraumatic, Normocephalic  EYES: EOMI, PERRL, conjunctiva and sclera clear  ENMT:  Moist mucous membranes,    NERVOUS SYSTEM:  Alert & Oriented X3,  Moves upper and lower extremities; CNS-II-XII  CHEST/LUNG: Clear to auscultation bilaterally; No rales, rhonchi, wheezing,   HEART: Regular rate and rhythm; No murmurs,   ABDOMEN: Soft, Nontender, Nondistended; Bowel sounds present  EXTREMITIES:  Peripheral Pulses, No  cyanosis, or edema  psychiatry- mood and affect appropriate, Insight and judgement intact     acetaminophen     Tablet .. 650 milliGRAM(s) Oral every 6 hours PRN  aluminum hydroxide/magnesium hydroxide/simethicone Suspension 30 milliLiter(s) Oral every 4 hours PRN  amLODIPine   Tablet 10 milliGRAM(s) Oral daily  benzonatate 100 milliGRAM(s) Oral every 8 hours PRN  cefTRIAXone Injectable. 1000 milliGRAM(s) IV Push every 24 hours  dextrose 5%. 1000 milliLiter(s) IV Continuous <Continuous>  dextrose 5%. 1000 milliLiter(s) IV Continuous <Continuous>  dextrose 50% Injectable 25 Gram(s) IV Push once  dextrose 50% Injectable 12.5 Gram(s) IV Push once  dextrose 50% Injectable 25 Gram(s) IV Push once  dextrose Oral Gel 15 Gram(s) Oral once PRN  glucagon  Injectable 1 milliGRAM(s) IntraMuscular once  guaifenesin/dextromethorphan Oral Liquid 10 milliLiter(s) Oral every 4 hours PRN  heparin   Injectable 5000 Unit(s) SubCutaneous every 8 hours  insulin lispro (ADMELOG) corrective regimen sliding scale   SubCutaneous three times a day before meals  insulin lispro (ADMELOG) corrective regimen sliding scale   SubCutaneous at bedtime  levothyroxine 100 MICROGram(s) Oral <User Schedule>  levothyroxine 50 MICROGram(s) Oral <User Schedule>  melatonin 3 milliGRAM(s) Oral at bedtime PRN  ondansetron Injectable 4 milliGRAM(s) IV Push every 8 hours PRN  simvastatin 10 milliGRAM(s) Oral at bedtime  sodium chloride 0.9%. 1000 milliLiter(s) IV Continuous <Continuous>      LABS:                          9.4    7.14  )-----------( 358      ( 26 Mar 2024 04:18 )             28.0     03-26    128<L>  |  96  |  13.0  ----------------------------<  181<H>  4.3   |  22.0  |  0.48<L>    Ca    7.5<L>      26 Mar 2024 04:18  Mg     1.4     03-26    TPro  6.0<L>  /  Alb  3.6  /  TBili  <0.2<L>  /  DBili  x   /  AST  13  /  ALT  28  /  AlkPhos  66  03-25    LIVER FUNCTIONS - ( 25 Mar 2024 14:30 )  Alb: 3.6 g/dL / Pro: 6.0 g/dL / ALK PHOS: 66 U/L / ALT: 28 U/L / AST: 13 U/L / GGT: x                 Urinalysis Basic - ( 26 Mar 2024 04:18 )    Color: x / Appearance: x / SG: x / pH: x  Gluc: 181 mg/dL / Ketone: x  / Bili: x / Urobili: x   Blood: x / Protein: x / Nitrite: x   Leuk Esterase: x / RBC: x / WBC x   Sq Epi: x / Non Sq Epi: x / Bacteria: x      CAPILLARY BLOOD GLUCOSE      POCT Blood Glucose.: 227 mg/dL (26 Mar 2024 11:48)  POCT Blood Glucose.: 214 mg/dL (26 Mar 2024 07:47)  POCT Blood Glucose.: 269 mg/dL (25 Mar 2024 23:10)      RADIOLOGY & ADDITIONAL TESTS:      Consultant notes reviewed    Case discussed with consultant/provider/ family /patient 
[facility] DIVISION OF KIDNEY DISEASES AND HYPERTENSION -- FOLLOW UP NOTE  --------------------------------------------------------------------------------  Chief Complaint: hyponatemia    24 hour events/subjective:        PAST HISTORY  --------------------------------------------------------------------------------  No significant changes to PMH, PSH, FHx, SHx, unless otherwise noted    ALLERGIES & MEDICATIONS  --------------------------------------------------------------------------------  Allergies  No Known Allergies        Standing Inpatient Medications  amLODIPine   Tablet 10 milliGRAM(s) Oral daily  cefTRIAXone Injectable. 1000 milliGRAM(s) IV Push every 24 hours  dextrose 5%. 1000 milliLiter(s) IV Continuous <Continuous>  dextrose 5%. 1000 milliLiter(s) IV Continuous <Continuous>  dextrose 50% Injectable 12.5 Gram(s) IV Push once  dextrose 50% Injectable 25 Gram(s) IV Push once  dextrose 50% Injectable 25 Gram(s) IV Push once  glucagon  Injectable 1 milliGRAM(s) IntraMuscular once  heparin   Injectable 5000 Unit(s) SubCutaneous every 8 hours  insulin lispro (ADMELOG) corrective regimen sliding scale   SubCutaneous three times a day before meals  insulin lispro (ADMELOG) corrective regimen sliding scale   SubCutaneous at bedtime  levothyroxine 100 MICROGram(s) Oral <User Schedule>  levothyroxine 50 MICROGram(s) Oral <User Schedule>  simvastatin 10 milliGRAM(s) Oral at bedtime  sodium chloride 1 Gram(s) Oral two times a day    PRN Inpatient Medications  acetaminophen     Tablet .. 650 milliGRAM(s) Oral every 6 hours PRN  aluminum hydroxide/magnesium hydroxide/simethicone Suspension 30 milliLiter(s) Oral every 4 hours PRN  benzonatate 100 milliGRAM(s) Oral every 8 hours PRN  dextrose Oral Gel 15 Gram(s) Oral once PRN  guaifenesin/dextromethorphan Oral Liquid 10 milliLiter(s) Oral every 4 hours PRN  melatonin 3 milliGRAM(s) Oral at bedtime PRN  ondansetron Injectable 4 milliGRAM(s) IV Push every 8 hours PRN      REVIEW OF SYSTEMS  --------------------------------------------------------------------------------  Gen: No weight changes, fatigue, fevers/chills, weakness  Skin: No rashes  Head/Eyes/Ears/Mouth: No headache; Normal hearing; Normal vision w/o blurriness; No sinus pain/discomfort, sore throat  Respiratory: No dyspnea, cough, wheezing, hemoptysis  CV: No chest pain, PND, orthopnea  GI: No abdominal pain, diarrhea, constipation, nausea, vomiting, melena, hematochezia  : No increased frequency, dysuria, hematuria, nocturia  MSK: No joint pain/swelling; no back pain; no edema  Neuro: No dizziness/lightheadedness, weakness, seizures, numbness, tingling  Heme: No easy bruising or bleeding  Endo: No heat/cold intolerance  Psych: No significant nervousness, anxiety, stress, depression    All other systems were reviewed and are negative, except as noted.    VITALS/PHYSICAL EXAM  --------------------------------------------------------------------------------  T(C): 36.5 (03-28-24 @ 04:49), Max: 36.5 (03-27-24 @ 17:01)  HR: 70 (03-28-24 @ 05:01) (65 - 73)  BP: 157/81 (03-28-24 @ 05:01) (157/81 - 184/76)  RR: 17 (03-28-24 @ 04:49) (17 - 17)  SpO2: 99% (03-28-24 @ 04:49) (95% - 99%)  Wt(kg): --        Physical Exam:  	Gen: NAD, well-appearing  	HEENT: PERRL, supple neck, clear oropharynx  	Pulm: CTA B/L  	CV: RRR, S1S2; no rub  	Back: No spinal or CVA tenderness; no sacral edema  	Abd: +BS, soft, nontender/nondistended  	: No suprapubic tenderness  	UE: Warm, FROM, no clubbing, intact strength; no edema; no asterixis  	LE: Warm, FROM, no clubbing, intact strength; no edema  	Neuro: No focal deficits, intact gait  	Psych: Normal affect and mood  	Skin: Warm, without rashes  	Vascular access:    LABS/STUDIES  --------------------------------------------------------------------------------              10.6   6.43  >-----------<  376      [03-28-24 @ 04:42]              31.2     127  |  93  |  18.3  ----------------------------<  254      [03-28-24 @ 11:31]  4.4   |  24.0  |  0.56        Ca     8.6     [03-28-24 @ 11:31]            Creatinine Trend:  SCr 0.56 [03-28 @ 11:31]  SCr 0.53 [03-28 @ 04:42]  SCr 0.72 [03-27 @ 17:00]  SCr 0.59 [03-27 @ 10:53]  SCr 0.48 [03-26 @ 04:18]    Urinalysis - [03-28-24 @ 11:31]      Color  / Appearance  / SG  / pH       Gluc 254 / Ketone   / Bili  / Urobili        Blood  / Protein  / Leuk Est  / Nitrite       RBC  / WBC  / Hyaline  / Gran  / Sq Epi  / Non Sq Epi  / Bacteria     Urine Creatinine 89      [03-25-24 @ 14:45]  Urine Sodium <30      [03-25-24 @ 14:45]  Urine Osmolality 627      [03-25-24 @ 14:45]    Vitamin D (25OH) 33.9      [12-18-23 @ 04:03]  TSH 7.65      [03-25-24 @ 14:30]  Lipid: chol 164, TG 39, HDL 53, LDL --      [12-17-23 @ 07:50]

## 2024-03-28 NOTE — DISCHARGE NOTE PROVIDER - ATTENDING DISCHARGE PHYSICAL EXAMINATION:
General: Age-appearing, in no acute distress  Head: Normocephalic, atraumatic  ENMT: EOMI, no scleral icterus, neck supple, no JVD  Cardiovascular: +S1, S2; Regular rate and rhythm, no murmurs.  Respiratory: CTAB, no wheezing, no rales, no rhonchi  Gastrointestinal: soft, ND, NT, (+) BS, (-) rebound  Neuro: AAOx3, CN2-12 intact, no FND  Musculoskeletal: Normal tone, no deformities  Skin: Warm, dry, no rash, no jaundice  Psych: Calm, cooperative

## 2024-03-28 NOTE — DISCHARGE NOTE PROVIDER - HOSPITAL COURSE
73y Female w/ PMH of uncontrolled NIDDM2, hypothyroidism, HTN, HLD, and chronic hyponatremia admitted for lethargy in setting of COVID infection and acute on chronic hyponatremia. The patient was started on normal saline hydration. Nephrology was consulted and recommendations were followed. The patient was monitored serially, clinically improved and ultimately deemed stable for discharge. Sodium improved from admission. This was discussed with Nephrology who also said patient was stable for discharge, outpatient f/u with nephrologist and recommended to start salt tabs 1g bid. 73y Female w/ PMH of uncontrolled NIDDM2, hypothyroidism, HTN, HLD, and chronic hyponatremia admitted for lethargy in setting of COVID infection and acute on chronic hyponatremia. The patient was started on normal saline hydration. Nephrology was consulted and recommendations were followed. The patient was monitored serially, clinically improved and ultimately deemed stable for discharge. Sodium improved from admission and today was found to be 129 but after correcting for hyperglycemia was 130. This was discussed with Nephrology who also said patient was stable for discharge, outpatient f/u with nephrologist and recommended to start salt tabs 1g bid.

## 2024-03-28 NOTE — PHYSICAL THERAPY INITIAL EVALUATION ADULT - SIT-TO-STAND BALANCE
Anesthesia Evaluation     Patient summary reviewed and Nursing notes reviewed   no history of anesthetic complications:  NPO Solid Status: > 8 hours  NPO Liquid Status: > 2 hours           Airway   Mallampati: II  TM distance: >3 FB  Neck ROM: full  No difficulty expected  Dental      Pulmonary - negative pulmonary ROS and normal exam    breath sounds clear to auscultation  Cardiovascular - normal exam  Exercise tolerance: good (4-7 METS)    Rhythm: regular  Rate: normal    (+) hypertension well controlled, valvular problems/murmurs murmur, CAD, dysrhythmias, PVD, hyperlipidemia,       Neuro/Psych  (+) numbness,    GI/Hepatic/Renal/Endo    (+)  GERD,  renal disease, diabetes mellitus type 2 well controlled, thyroid problem hypothyroidism    Musculoskeletal (-) negative ROS    Abdominal    Substance History - negative use     OB/GYN negative ob/gyn ROS         Other - negative ROS                       Anesthesia Plan    ASA 3     general     (Total IV Anesthesia    Patient understands anesthesia not responsible for dental damage.  )  intravenous induction     Anesthetic plan, risks, benefits, and alternatives have been provided, discussed and informed consent has been obtained with: patient.    Plan discussed with CRNA.        CODE STATUS:        normal balance

## 2024-03-28 NOTE — PROGRESS NOTE ADULT - ASSESSMENT
73y Female w/ PMH of uncontrolled NIDDM2, hypothyroidism, HTN, HLD, and chronic hyponatremia admitted for lethargy in setting of COVID infection and acute on chronic hyponatremia.#Euvolemic hyponatremia, mixed etiology  -Pt likely has chronic hyponatremia given hx of decreased PO intake SOLUTES , uncontrolled diabetes resulting in likely osmotic diuresis, and possible contribution of SIADH from medication use  -Acute drop since prior hospitalization from 130 to 123  -Maintenance fluids with NS at 75 cc/hr given decreased PO intake  -Urine studies on prior admission - low urine sodium noted  -Nephrology consulted- assessment plan pending     #DM2, uncontrolled  -A1c > 9  -ISS insulin pending sliding scale needs    #HTN  #HLD  -Hold ARB for now  -Continue home Norvasc  -Continue home statin    #Hypothyroidism  -Continue home levothyroxine - 100mcg two times per week, 50mcg on remaining days    DVT PPx: Heparin    Hyponatremia  -Recurrent, likely diet induced; started on salt tabs last admission  -initial a 123, given 1L Bolus fluid; repeat BMP pending  -Trend Q4 hours until normalized, not to exceed 8 meq in first 24 hours  -Nephro c/s     COVID  -Not hypoxic, no indication for Remdesevir/Decadron  -Symptom management    UTI-Rocephin for now  -UA+ LE, nitrates, bacteria  -Follow up cultures    
74 y/o F w/ PMH of uncontrolled NIDDM2, hypothyroidism, HTN, HLD, and chronic hyponatremia who presented to the ED after recent admission for symptomatic hyponatremia with complaints of generalized weakness and fatigue.    ·Hyponatremia multifactorial in setting of poor p.o. intake, osmotic diuresis in setting of hyperglycemia   Serum sodium 126 (corrected for hyperglycemia) on presentation- 133 on recent hospital discharge  sp IV hydration  SNa+ stable at 130 this Am ( corrected for hyperglycemia)  UA dirty- Uosm ~ 627, Misty+ < 30  resume salt tabs 1 g bid  Encourage po solute intake  Needs adequate blood sugar control    HTN  Bp high  Continue amlodipine  resume ARB on discharge    UTI   sp Rocephin      COvid 19 infection  Asymptomatic  no indication for Remdesevir as per primary team    Nephrology follow up outpt on Thursday at 11;30 am  ([address] [place] [address])      elvie Akbar
73y Female w/ PMH of uncontrolled NIDDM2, hypothyroidism, HTN, HLD, and chronic hyponatremia admitted for lethargy in setting of COVID infection and acute on chronic hyponatremia.     Hyponatremia (improving after correcting for hyperglycemia)  -Recurrent, likely diet induced,  uncontrolled diabetes resulting in likely osmotic diuresis, and possible contribution of SIADH from medication use  -initial Na 123, given 1L Bolus fluid  -Trend Q6 hours until normalized, not to exceed 8 meq in first 24 hours  -Maintenance fluids with NS at 75 cc/hr given decreased PO intake  -Nephro consulted, recs noted,   -Salt tabs 1g bid    #COVID  -Not hypoxic, no indication for Remdesevir/Decadron  -Symptom management    #UTI  -C/w Rocephin for now, Macrobid on d/c  -UA+ LE, nitrates, bacteria  -Follow up cultures    #DM2, uncontrolled  -A1c > 9  -ISS insulin pending sliding scale needs  -Needs outpatient Endo    #HTN  #HLD  -Hold ARB for now  -Continue home Norvasc  -Continue home statin    #Hypothyroidism  -Continue home levothyroxine - 100mcg two times per week, 50mcg on remaining days    DVT PPx: Heparin  Dispo: pending sodium improvement    
73y Female w/ PMH of uncontrolled NIDDM2, hypothyroidism, HTN, HLD, and chronic hyponatremia admitted for lethargy in setting of COVID infection and acute on chronic hyponatremia.#Euvolemic hyponatremia, mixed etiology    Hyponatremia (improving)  -Recurrent, likely diet induced,  uncontrolled diabetes resulting in likely osmotic diuresis, and possible contribution of SIADH from medication use  -initial Na 123, given 1L Bolus fluid; repeat BMP pending  -Trend Q6 hours until normalized, not to exceed 8 meq in first 24 hours  -Maintenance fluids with NS at 75 cc/hr given decreased PO intake  -Nephro consulted, recs noted    #COVID  -Not hypoxic, no indication for Remdesevir/Decadron  -Symptom management    #UTI-Rocephin for now  -UA+ LE, nitrates, bacteria  -Follow up cultures    #DM2, uncontrolled  -A1c > 9  -ISS insulin pending sliding scale needs    #HTN  #HLD  -Hold ARB for now  -Continue home Norvasc  -Continue home statin    #Hypothyroidism  -Continue home levothyroxine - 100mcg two times per week, 50mcg on remaining days    DVT PPx: Heparin  Dispo: pending PT eval, likely d/c within next 1-2 days

## 2024-03-28 NOTE — DISCHARGE NOTE PROVIDER - NSDCCPCAREPLAN_GEN_ALL_CORE_FT
PRINCIPAL DISCHARGE DIAGNOSIS  Diagnosis: Hyponatremia  Assessment and Plan of Treatment: Please take salt tabs as indicated.  Please follow-up with Nephrologist.      SECONDARY DISCHARGE DIAGNOSES  Diagnosis: Acute UTI  Assessment and Plan of Treatment: Please take antibiotics as prescribed

## 2024-03-28 NOTE — DISCHARGE NOTE PROVIDER - NSDCMRMEDTOKEN_GEN_ALL_CORE_FT
alendronate 70 mg oral tablet: 1 tab(s) orally once a week Fridays  Janumet  mg-1000 mg oral tablet, extended release: 1 tab(s) orally 2 times a day  levothyroxine 100 mcg (0.1 mg) oral tablet: 1 tab(s) orally 2 times a week Monday and Thursday  levothyroxine 50 mcg (0.05 mg) oral tablet: 1 tab(s) orally 5 times a week Sundays, Tuesdays, Wednesdays, Fridays and Saturedays  losartan 50 mg oral tablet: 1 tab(s) orally once a day  Norvasc 10 mg oral tablet: 1 tab(s) orally once a day  Prandin 0.5 mg oral tablet: 1 tab(s) orally 2 times a day  simvastatin 10 mg oral tablet: 1 tab(s) orally once a day (at bedtime)   alendronate 70 mg oral tablet: 1 tab(s) orally once a week Fridays  amLODIPine 10 mg oral tablet: 1 tab(s) orally once a day  Janumet  mg-1000 mg oral tablet, extended release: 1 tab(s) orally 2 times a day  levothyroxine 100 mcg (0.1 mg) oral tablet: 1 tab(s) orally once a day  levothyroxine 50 mcg (0.05 mg) oral tablet: 1 tab(s) orally once a day  losartan 50 mg oral tablet: 1 tab(s) orally once a day  Macrobid 100 mg oral capsule: 1 cap(s) orally 2 times a day  Prandin 0.5 mg oral tablet: 1 tab(s) orally 2 times a day  simvastatin 10 mg oral tablet: 1 tab(s) orally once a day (at bedtime)  Sodium Chloride 1 g oral tablet: 1 tab(s) orally 2 times a day

## 2024-03-28 NOTE — DISCHARGE NOTE PROVIDER - NSDCFUSCHEDAPPT_GEN_ALL_CORE_FT
[facility]  HNT PreAdmits  Scheduled Appointment: 03/29/2024    Roberto Degroot  [facility]  NEPHRO [address]  Scheduled Appointment: 04/01/2024    Syed, Romana  [facility]  INTMED [address]  Scheduled Appointment: 04/04/2024    Gladys Heck  [facility]  UROGYN [address]  Scheduled Appointment: 05/09/2024    Terence Alegre  [facility]  ENDOCRIN [address]  Scheduled Appointment: 05/15/2024    Valeria Miller  [facility]  ENDOCRIN [address]  Scheduled Appointment: 05/16/2024    Gladys Heck  [facility]  UROGYN [address]  Scheduled Appointment: 05/29/2024

## 2024-03-28 NOTE — DISCHARGE NOTE NURSING/CASE MANAGEMENT/SOCIAL WORK - NSDCPEFALRISK_GEN_ALL_CORE
For information on Fall & Injury Prevention, visit: [email or web address] OR  [email or web address] OR  https://www.cdc.gov/steadi/patient.html

## 2024-03-29 ENCOUNTER — APPOINTMENT (OUTPATIENT)
Dept: BREAST CENTER | Facility: HOSPITAL | Age: 74
End: 2024-03-29

## 2024-03-30 LAB
CULTURE RESULTS: SIGNIFICANT CHANGE UP
CULTURE RESULTS: SIGNIFICANT CHANGE UP
SPECIMEN SOURCE: SIGNIFICANT CHANGE UP
SPECIMEN SOURCE: SIGNIFICANT CHANGE UP

## 2024-03-31 ENCOUNTER — EMERGENCY (EMERGENCY)
Facility: HOSPITAL | Age: 74
LOS: 1 days | Discharge: HOME | End: 2024-03-31
Attending: EMERGENCY MEDICINE
Payer: MEDICARE

## 2024-03-31 VITALS
WEIGHT: 94.8 LBS | TEMPERATURE: 98 F | HEART RATE: 78 BPM | HEIGHT: 55 IN | OXYGEN SATURATION: 97 % | DIASTOLIC BLOOD PRESSURE: 89 MMHG | RESPIRATION RATE: 17 BRPM | SYSTOLIC BLOOD PRESSURE: 148 MMHG

## 2024-03-31 DIAGNOSIS — Z98.49 CATARACT EXTRACTION STATUS, UNSPECIFIED EYE: Chronic | ICD-10-CM

## 2024-03-31 LAB
ACETONE SERPL-MCNC: NEGATIVE — SIGNIFICANT CHANGE UP
ALBUMIN SERPL ELPH-MCNC: 3.6 G/DL — SIGNIFICANT CHANGE UP (ref 3.3–5.2)
ALP SERPL-CCNC: 67 U/L — SIGNIFICANT CHANGE UP (ref 40–120)
ALT FLD-CCNC: 24 U/L — SIGNIFICANT CHANGE UP
ANION GAP SERPL CALC-SCNC: 12 MMOL/L — SIGNIFICANT CHANGE UP (ref 5–17)
APPEARANCE UR: CLEAR — SIGNIFICANT CHANGE UP
AST SERPL-CCNC: 17 U/L — SIGNIFICANT CHANGE UP
BACTERIA # UR AUTO: NEGATIVE /HPF — SIGNIFICANT CHANGE UP
BASE EXCESS BLDV CALC-SCNC: -0.5 MMOL/L — SIGNIFICANT CHANGE UP (ref -2–3)
BASOPHILS # BLD AUTO: 0.05 K/UL — SIGNIFICANT CHANGE UP (ref 0–0.2)
BASOPHILS NFR BLD AUTO: 0.5 % — SIGNIFICANT CHANGE UP (ref 0–2)
BILIRUB SERPL-MCNC: 0.3 MG/DL — LOW (ref 0.4–2)
BILIRUB UR-MCNC: NEGATIVE — SIGNIFICANT CHANGE UP
BUN SERPL-MCNC: 15.8 MG/DL — SIGNIFICANT CHANGE UP (ref 8–20)
CA-I SERPL-SCNC: 1.14 MMOL/L — LOW (ref 1.15–1.33)
CALCIUM SERPL-MCNC: 8.8 MG/DL — SIGNIFICANT CHANGE UP (ref 8.4–10.5)
CAST: 0 /LPF — SIGNIFICANT CHANGE UP (ref 0–4)
CHLORIDE BLDV-SCNC: 99 MMOL/L — SIGNIFICANT CHANGE UP (ref 96–108)
CHLORIDE SERPL-SCNC: 94 MMOL/L — LOW (ref 96–108)
CO2 SERPL-SCNC: 21 MMOL/L — LOW (ref 22–29)
COLOR SPEC: YELLOW — SIGNIFICANT CHANGE UP
CREAT SERPL-MCNC: 0.46 MG/DL — LOW (ref 0.5–1.3)
DIFF PNL FLD: NEGATIVE — SIGNIFICANT CHANGE UP
EGFR: 100 ML/MIN/1.73M2 — SIGNIFICANT CHANGE UP
EOSINOPHIL # BLD AUTO: 0.46 K/UL — SIGNIFICANT CHANGE UP (ref 0–0.5)
EOSINOPHIL NFR BLD AUTO: 4.7 % — SIGNIFICANT CHANGE UP (ref 0–6)
GAS PNL BLDV: 125 MMOL/L — LOW (ref 136–145)
GAS PNL BLDV: SIGNIFICANT CHANGE UP
GAS PNL BLDV: SIGNIFICANT CHANGE UP
GLUCOSE BLDV-MCNC: 348 MG/DL — HIGH (ref 70–99)
GLUCOSE SERPL-MCNC: 328 MG/DL — HIGH (ref 70–99)
GLUCOSE UR QL: >=1000 MG/DL
HCO3 BLDV-SCNC: 24 MMOL/L — SIGNIFICANT CHANGE UP (ref 22–29)
HCT VFR BLD CALC: 31.2 % — LOW (ref 34.5–45)
HGB BLD-MCNC: 10.7 G/DL — LOW (ref 11.5–15.5)
IMM GRANULOCYTES NFR BLD AUTO: 1 % — HIGH (ref 0–0.9)
KETONES UR-MCNC: NEGATIVE MG/DL — SIGNIFICANT CHANGE UP
LACTATE BLDV-MCNC: 2 MMOL/L — SIGNIFICANT CHANGE UP (ref 0.5–2)
LEUKOCYTE ESTERASE UR-ACNC: NEGATIVE — SIGNIFICANT CHANGE UP
LYMPHOCYTES # BLD AUTO: 0.92 K/UL — LOW (ref 1–3.3)
LYMPHOCYTES # BLD AUTO: 9.4 % — LOW (ref 13–44)
MCHC RBC-ENTMCNC: 27.2 PG — SIGNIFICANT CHANGE UP (ref 27–34)
MCHC RBC-ENTMCNC: 34.3 GM/DL — SIGNIFICANT CHANGE UP (ref 32–36)
MCV RBC AUTO: 79.4 FL — LOW (ref 80–100)
MONOCYTES # BLD AUTO: 0.54 K/UL — SIGNIFICANT CHANGE UP (ref 0–0.9)
MONOCYTES NFR BLD AUTO: 5.5 % — SIGNIFICANT CHANGE UP (ref 2–14)
NEUTROPHILS # BLD AUTO: 7.72 K/UL — HIGH (ref 1.8–7.4)
NEUTROPHILS NFR BLD AUTO: 78.9 % — HIGH (ref 43–77)
NITRITE UR-MCNC: NEGATIVE — SIGNIFICANT CHANGE UP
OSMOLALITY SERPL: 292 MOSMOL/KG — SIGNIFICANT CHANGE UP (ref 280–301)
PCO2 BLDV: 37 MMHG — LOW (ref 39–42)
PH BLDV: 7.42 — SIGNIFICANT CHANGE UP (ref 7.32–7.43)
PH UR: 6 — SIGNIFICANT CHANGE UP (ref 5–8)
PLATELET # BLD AUTO: 380 K/UL — SIGNIFICANT CHANGE UP (ref 150–400)
PO2 BLDV: 63 MMHG — HIGH (ref 25–45)
POTASSIUM BLDV-SCNC: 4.6 MMOL/L — SIGNIFICANT CHANGE UP (ref 3.5–5.1)
POTASSIUM SERPL-MCNC: 4.4 MMOL/L — SIGNIFICANT CHANGE UP (ref 3.5–5.3)
POTASSIUM SERPL-SCNC: 4.4 MMOL/L — SIGNIFICANT CHANGE UP (ref 3.5–5.3)
PROT SERPL-MCNC: 6.1 G/DL — LOW (ref 6.6–8.7)
PROT UR-MCNC: 30 MG/DL
RBC # BLD: 3.93 M/UL — SIGNIFICANT CHANGE UP (ref 3.8–5.2)
RBC # FLD: 13.3 % — SIGNIFICANT CHANGE UP (ref 10.3–14.5)
RBC CASTS # UR COMP ASSIST: 0 /HPF — SIGNIFICANT CHANGE UP (ref 0–4)
SAO2 % BLDV: 94.7 % — SIGNIFICANT CHANGE UP
SODIUM SERPL-SCNC: 127 MMOL/L — LOW (ref 135–145)
SP GR SPEC: >1.03 — HIGH (ref 1–1.03)
SQUAMOUS # UR AUTO: 1 /HPF — SIGNIFICANT CHANGE UP (ref 0–5)
UROBILINOGEN FLD QL: 1 MG/DL — SIGNIFICANT CHANGE UP (ref 0.2–1)
WBC # BLD: 9.79 K/UL — SIGNIFICANT CHANGE UP (ref 3.8–10.5)
WBC # FLD AUTO: 9.79 K/UL — SIGNIFICANT CHANGE UP (ref 3.8–10.5)
WBC UR QL: 4 /HPF — SIGNIFICANT CHANGE UP (ref 0–5)

## 2024-03-31 PROCEDURE — 84132 ASSAY OF SERUM POTASSIUM: CPT

## 2024-03-31 PROCEDURE — 82330 ASSAY OF CALCIUM: CPT

## 2024-03-31 PROCEDURE — 80053 COMPREHEN METABOLIC PANEL: CPT

## 2024-03-31 PROCEDURE — 84295 ASSAY OF SERUM SODIUM: CPT

## 2024-03-31 PROCEDURE — 99285 EMERGENCY DEPT VISIT HI MDM: CPT | Mod: 25

## 2024-03-31 PROCEDURE — 71045 X-RAY EXAM CHEST 1 VIEW: CPT

## 2024-03-31 PROCEDURE — 93005 ELECTROCARDIOGRAM TRACING: CPT

## 2024-03-31 PROCEDURE — 85018 HEMOGLOBIN: CPT

## 2024-03-31 PROCEDURE — 81001 URINALYSIS AUTO W/SCOPE: CPT

## 2024-03-31 PROCEDURE — 82803 BLOOD GASES ANY COMBINATION: CPT

## 2024-03-31 PROCEDURE — 85014 HEMATOCRIT: CPT

## 2024-03-31 PROCEDURE — 82962 GLUCOSE BLOOD TEST: CPT

## 2024-03-31 PROCEDURE — 83930 ASSAY OF BLOOD OSMOLALITY: CPT

## 2024-03-31 PROCEDURE — 83605 ASSAY OF LACTIC ACID: CPT

## 2024-03-31 PROCEDURE — 82947 ASSAY GLUCOSE BLOOD QUANT: CPT

## 2024-03-31 PROCEDURE — 94640 AIRWAY INHALATION TREATMENT: CPT

## 2024-03-31 PROCEDURE — 82435 ASSAY OF BLOOD CHLORIDE: CPT

## 2024-03-31 PROCEDURE — 71045 X-RAY EXAM CHEST 1 VIEW: CPT | Mod: 26

## 2024-03-31 PROCEDURE — 87040 BLOOD CULTURE FOR BACTERIA: CPT

## 2024-03-31 PROCEDURE — 99285 EMERGENCY DEPT VISIT HI MDM: CPT

## 2024-03-31 PROCEDURE — 82009 KETONE BODYS QUAL: CPT

## 2024-03-31 PROCEDURE — 93010 ELECTROCARDIOGRAM REPORT: CPT

## 2024-03-31 PROCEDURE — 36415 COLL VENOUS BLD VENIPUNCTURE: CPT

## 2024-03-31 PROCEDURE — 85025 COMPLETE CBC W/AUTO DIFF WBC: CPT

## 2024-03-31 RX ORDER — ONDANSETRON 8 MG/1
4 TABLET, FILM COATED ORAL ONCE
Refills: 0 | Status: COMPLETED | OUTPATIENT
Start: 2024-03-31 | End: 2024-03-31

## 2024-03-31 RX ORDER — SODIUM CHLORIDE 9 MG/ML
1000 INJECTION, SOLUTION INTRAVENOUS ONCE
Refills: 0 | Status: COMPLETED | OUTPATIENT
Start: 2024-03-31 | End: 2024-03-31

## 2024-03-31 RX ORDER — FLUTICASONE PROPIONATE 50 MCG
2 SPRAY, SUSPENSION NASAL ONCE
Refills: 0 | Status: COMPLETED | OUTPATIENT
Start: 2024-03-31 | End: 2024-03-31

## 2024-03-31 RX ADMIN — SODIUM CHLORIDE 1000 MILLILITER(S): 9 INJECTION, SOLUTION INTRAVENOUS at 14:14

## 2024-03-31 RX ADMIN — Medication 2 SPRAY(S): at 16:20

## 2024-03-31 NOTE — ED PROVIDER NOTE - NSFOLLOWUPINSTRUCTIONS_ED_ALL_ED_FT
1) Please follow-up with your primary care doctor in the next 5-7 days.  Please call tomorrow for an appointment.  If you cannot follow-up with your primary care doctor please return to the ED for any urgent issues.  2) You were given a copy of the tests performed today.  Please bring the results with you and review them with your primary care doctor.  3) If you have any worsening of symptoms or any other concerns please return to the ED immediately.  4) Please continue taking your home medications as directed.     Viral Respiratory Infection    A viral respiratory infection is an illness that affects parts of the body used for breathing, like the lungs, nose, and throat. It is caused by a germ called a virus. Symptoms can include runny nose, coughing, sneezing, fatigue, body aches, sore throat, fever, or headache. Over the counter medicine can be used to manage the symptoms but the infection typically goes away on its own in 5 to 10 days.     SEEK IMMEDIATE MEDICAL CARE IF YOU HAVE ANY OF THE FOLLOWING SYMPTOMS: shortness of breath, chest pain, fever over 10 days, or lightheadedness/dizziness.

## 2024-03-31 NOTE — ED ADULT NURSE NOTE - CHIEF COMPLAINT QUOTE
+ COVID , + UTI 3/25/24; c/o persistent  weakness, malaise/fatigue, elevated glucose levels, + vomiting. pt reports shes on oral macarobid, recently placed on NACL tabs. fs 302

## 2024-03-31 NOTE — ED PROVIDER NOTE - PATIENT PORTAL LINK FT
You can access the FollowMyHealth Patient Portal offered by [facility] by registering at the following website: [email or web address]. By joining [facility]’s FollowMyHealth portal, you will also be able to view your health information using other applications (apps) compatible with our system.

## 2024-03-31 NOTE — ED ADULT NURSE NOTE - NSFALLUNIVINTERV_ED_ALL_ED
Bed/Stretcher in lowest position, wheels locked, appropriate side rails in place/Call bell, personal items and telephone in reach/Instruct patient to call for assistance before getting out of bed/chair/stretcher/Non-slip footwear applied when patient is off stretcher/[place] to call system/Physically safe environment - no spills, clutter or unnecessary equipment/Purposeful proactive rounding/Room/bathroom lighting operational, light cord in reach

## 2024-03-31 NOTE — ED PROVIDER NOTE - PROGRESS NOTE DETAILS
Conversation had with patient and daughters regarding results of testing. Pt feels better and pt and daughters feel comfortable going home w/ return precautions. Patient agrees with plan for discharge at this time. Patient agrees to comply with follow up with PCP. Return to ED precautions and discharge instructions given to patient.

## 2024-03-31 NOTE — ED ADULT NURSE NOTE - OBJECTIVE STATEMENT
Pt is AxOx3 jessica fatigue and weakness, pt has covid. Breathing even and unlabored,  at 100% RA. Breathing even and unlabored. Denies nausea, pain. Pt is aware of plan of care.

## 2024-03-31 NOTE — ED PROVIDER NOTE - ATTENDING CONTRIBUTION TO CARE
73-year-old female; PMH significant for DM 2, HTN, HLD, hypothyroidism, chronic hyponatremia (on salt tabs); now presenting with generalized weakness and fatigue.  Patient reports having a UTI last week and also diagnosed with COVID infection at that time.  Patient states she was discharged on salt tabs twice daily.  Reports over the past 3 to 4 days increasingly weak and malaise.  Awoke this morning early with nausea and vomiting–NBNB.  Denies abdominal pain.  Denies dysuria, frequency, urgency.  Patient states she took her dose of Macrobid prior to sleep.  Denies fever, chills, sweats. 73-year-old female; PMH significant for DM 2, HTN, HLD, hypothyroidism, chronic hyponatremia (on salt tabs); now presenting with generalized weakness and fatigue.  Patient reports having a UTI last week and also diagnosed with COVID infection at that time.  Patient states she was discharged on salt tabs twice daily.  Reports over the past 3 to 4 days increasingly weak and malaise.  Awoke this morning early with nausea and vomiting–NBNB.  Denies abdominal pain.  Denies dysuria, frequency, urgency.  Patient states she took her dose of Macrobid prior to sleep.  Denies fever, chills, sweats.  General:     NAD  Head:     NC/AT, EOMI, oral mucosa moist  Neck:     trachea midline  Lungs:     CTA b/l, no w/r/r  CVS:     S1S2, RRR, no m/g/r  Abd:     +BS, s/nt/nd, no organomegaly  Ext:    zarate x4  Neuro: grossly intact  A/P:  73yoF p/w generalized malaise.  -labs, ekg, ivf, re-eval

## 2024-03-31 NOTE — ED PROVIDER NOTE - CLINICAL SUMMARY MEDICAL DECISION MAKING FREE TEXT BOX
74 y/o F w/ PMH of uncontrolled NIDDM2, hypothyroidism, HTN, HLD, and chronic hyponatremia who presented to the ED after recent admission for symptomatic hyponatremia with complaints of generalized weakness and fatigue.  Patient denies service  and daughter at bedside providing history.  Patient was recently discharged from [facility] after being treated for symptomatic acute on chronic hyponatremia and lethargy in the setting of COVID infection.  At that time her sodium on discharge was 129 and corrected for hyperglycemia was 130.  Patient was started on salt tabs 1 g twice daily.  Per daughter, patient has been feeling progressively weaker, malaise, fatigue and woke up at 2 AM last night vomiting, NBNB.  Patient took Macrobid dose at 10 PM prior to sleep for UTI diagnosed 3/25/2024.  Patient is COVID-positive.     VS stable, pt is well appearing. Pending common labs, DKA labs, CXR, BCx, osmolality, acetone, ekg. Pending 2L IVF, will reassess and POCT gluc q2hrs. Likely dc home w/ follow up to nephrology and PCP.

## 2024-03-31 NOTE — ED PROVIDER NOTE - OBJECTIVE STATEMENT
74 y/o F w/ PMH of uncontrolled NIDDM2, hypothyroidism, HTN, HLD, and chronic hyponatremia who presented to the ED after recent admission for symptomatic hyponatremia with complaints of generalized weakness and fatigue.  Patient denies service  and daughter at bedside providing history.  Patient was recently discharged from [facility] after being treated for symptomatic acute on chronic hyponatremia and lethargy in the setting of COVID infection.  At that time her sodium on discharge was 129 and corrected for hyperglycemia was 130.  Patient was started on salt tabs 1 g twice daily.  Per daughter, patient has been feeling progressively weaker, malaise, fatigue and woke up at 2 AM last night vomiting, NBNB.  Patient took Macrobid dose at 10 PM prior to sleep for UTI diagnosed 3/25/2024.  Patient is COVID-positive. No fever/chills, No headache, No photophobia/eye pain/changes in vision, No ear pain/sore throat/dysphagia, No chest pain/palpitations, no SOB/cough/wheeze/stridor, No abdominal pain, No N/V/D, no dysuria/frequency/discharge, No neck/back pain, no rash, no changes in neurological status/function. No travel, No sick contacts. Not taking blood thinners.

## 2024-03-31 NOTE — ED ADULT TRIAGE NOTE - CHIEF COMPLAINT QUOTE
+ COVID 3/25/24; c/o weakness, malaise/fatigue, elevated glucose levels, + vomiting + COVID 3/25/24; c/o weakness, malaise/fatigue, elevated glucose levels, + vomiting. pt reports + UTI as well; on macarobid, recetnly placed on NACL tabs. + COVID , + UTI 3/25/24; c/o persistent  weakness, malaise/fatigue, elevated glucose levels, + vomiting. pt reports shes on oral macarobid, recently placed on NACL tabs. + COVID , + UTI 3/25/24; c/o persistent  weakness, malaise/fatigue, elevated glucose levels, + vomiting. pt reports shes on oral macarobid, recently placed on NACL tabs. fs 302

## 2024-04-01 ENCOUNTER — APPOINTMENT (OUTPATIENT)
Dept: NEPHROLOGY | Facility: CLINIC | Age: 74
End: 2024-04-01
Payer: MEDICARE

## 2024-04-01 ENCOUNTER — NON-APPOINTMENT (OUTPATIENT)
Age: 74
End: 2024-04-01

## 2024-04-01 VITALS
HEART RATE: 77 BPM | SYSTOLIC BLOOD PRESSURE: 132 MMHG | DIASTOLIC BLOOD PRESSURE: 62 MMHG | OXYGEN SATURATION: 98 % | BODY MASS INDEX: 19.56 KG/M2 | HEIGHT: 59 IN | WEIGHT: 97 LBS

## 2024-04-01 PROCEDURE — 99213 OFFICE O/P EST LOW 20 MIN: CPT

## 2024-04-01 NOTE — HISTORY OF PRESENT ILLNESS
[FreeTextEntry1] : 73y old female w/ PMH of uncontrolled NIDDM2, hypothyroidism, HTN, HLD, and chronic hyponatremia is here for evaluation of hyponatremia.  Patient was recently admitted in [facility] due to lethargy and Covid Infection. Got UTI too and was taking Bactrim till today. Going to [place] tomorrow for 3 months and will follow when returns.   Pt is accompanied by her daughter who was also translating.  PT is denying any acute complaints. Denying any urinary and cardiac complaints. No shortness of breath and has no LE edema. Recent serum sodium level in the hospital was 130.

## 2024-04-01 NOTE — ASSESSMENT
[FreeTextEntry1] : 73y old female w/ PMH of uncontrolled NIDDM2, hypothyroidism, HTN, HLD, and chronic hyponatremia is here for evaluation of hyponatremia.    Hyponatremia=  May be in the setting of Expanded volumes.  Pt is advised to restrict fluids intake to 33-40 Ozes.  Can't start sodium tablets due to high BP. If no improvement, will evaluate more deeply.   HTN= BP is controlled with current meds.           Will continue same plan.  DM=  As per endo/PCP.

## 2024-04-01 NOTE — CDI QUERY NOTE - NSCDIOTHERTXTBX_GEN_ALL_CORE_HH
Asper documentation patient was lethargic with Covid and hyponatremia, please clarify if this finding supports a clinically significant diagnosis.  -	Metabolic encephalopathy due to Covid and hyponatremia  -	Toxic metabolic encephalopathy due to Covid and hyponatremia  -	Other, please specify        Supporting Documentation:      Progress Note Adult-Internal Medicine Attending [Charted Location: [address] [address] 01] [Authored: 27-Mar-2024 08:34]  Neuro: AAOx3, CN2-12 intact, no FND    Assessment and Plan:   • Assessment	  73y Female w/ PMH of uncontrolled NIDDM2, hypothyroidism, HTN, HLD, and chronic hyponatremia admitted for lethargy in setting of COVID infection and acute on chronic hyponatremia.#Euvolemic hyponatremia, mixed etiology        Discharge Note Provider [Charted Location: [address] [address] 01] [Authored: 28-Mar-2024 13:36]    Hospital Course	  73y Female w/ PMH of uncontrolled NIDDM2, hypothyroidism, HTN, HLD, and chronic hyponatremia admitted for lethargy in setting of COVID infection and acute on chronic hyponatremia. The patient was started on normal saline hydration. Nephrology was consulted and recommendations were followed. The patient was monitored serially, clinically improved and ultimately deemed stable for discharge.

## 2024-04-01 NOTE — PHYSICAL EXAM
[General Appearance - Alert] : alert [General Appearance - In No Acute Distress] : in no acute distress [Sclera] : the sclera and conjunctiva were normal [Extraocular Movements] : extraocular movements were intact [Outer Ear] : the ears and nose were normal in appearance [Neck Appearance] : the appearance of the neck was normal [Neck Cervical Mass (___cm)] : no neck mass was observed [Jugular Venous Distention Increased] : there was no jugular-venous distention [Auscultation Breath Sounds / Voice Sounds] : lungs were clear to auscultation bilaterally [Heart Rate And Rhythm] : heart rate was normal and rhythm regular [Heart Sounds] : normal S1 and S2 [Heart Sounds Gallop] : no gallops [Edema] : there was no peripheral edema [Bowel Sounds] : normal bowel sounds [Abdomen Soft] : soft [Abdomen Tenderness] : non-tender [Abdomen Mass (___ Cm)] : no abdominal mass palpated [Skin Color & Pigmentation] : normal skin color and pigmentation [] : no rash [Skin Turgor] : normal skin turgor [Deep Tendon Reflexes (DTR)] : deep tendon reflexes were 2+ and symmetric [Sensation] : the sensory exam was normal to light touch and pinprick [No Focal Deficits] : no focal deficits [Oriented To Time, Place, And Person] : oriented to person, place, and time [Impaired Insight] : insight and judgment were intact [Affect] : the affect was normal

## 2024-04-01 NOTE — REASON FOR VISIT
[Initial Evaluation] : an initial evaluation [Family Member] : family member [FreeTextEntry1] : Hyponatremia

## 2024-04-03 DIAGNOSIS — I10 ESSENTIAL (PRIMARY) HYPERTENSION: ICD-10-CM

## 2024-04-03 DIAGNOSIS — E78.5 HYPERLIPIDEMIA, UNSPECIFIED: ICD-10-CM

## 2024-04-03 DIAGNOSIS — R53.1 WEAKNESS: ICD-10-CM

## 2024-04-03 DIAGNOSIS — E11.10 TYPE 2 DIABETES MELLITUS WITH KETOACIDOSIS WITHOUT COMA: ICD-10-CM

## 2024-04-03 DIAGNOSIS — E03.9 HYPOTHYROIDISM, UNSPECIFIED: ICD-10-CM

## 2024-04-03 DIAGNOSIS — B34.9 VIRAL INFECTION, UNSPECIFIED: ICD-10-CM

## 2024-04-04 ENCOUNTER — APPOINTMENT (OUTPATIENT)
Dept: INTERNAL MEDICINE | Facility: CLINIC | Age: 74
End: 2024-04-04
Payer: MEDICARE

## 2024-04-04 VITALS
HEART RATE: 72 BPM | DIASTOLIC BLOOD PRESSURE: 72 MMHG | TEMPERATURE: 98 F | OXYGEN SATURATION: 99 % | SYSTOLIC BLOOD PRESSURE: 134 MMHG | BODY MASS INDEX: 19.56 KG/M2 | WEIGHT: 97 LBS | HEIGHT: 59 IN

## 2024-04-04 DIAGNOSIS — R92.8 OTHER ABNORMAL AND INCONCLUSIVE FINDINGS ON DIAGNOSTIC IMAGING OF BREAST: ICD-10-CM

## 2024-04-04 PROCEDURE — G2211 COMPLEX E/M VISIT ADD ON: CPT

## 2024-04-04 PROCEDURE — 99215 OFFICE O/P EST HI 40 MIN: CPT

## 2024-04-04 NOTE — HISTORY OF PRESENT ILLNESS
[FreeTextEntry1] : f/u [de-identified] : 72 y/o F Pmhx DM2, HLD, HTN, depression who was hospitalized at [facility] due to feeling week and paresthesia of extremities, hyponatremia( causes hospitalizationin Dec/23 ), frequent BM(with stool soling),   Last see in Feb:  Interval since last the last with:  Was in the hospital on 25 March, needed for 2 or 3 days COVID-positive, elevated blood sugars and hyponatremia. Discharged home. Revisited the ER on April 1, for weakness, sodium was 127, given IV fluid, discharged home same day Has been seen by endocrinologist, her Diamicron has been DC'd now on a new drug known as repaglinide, same dose of Janumet, last .  Losartan was increased to 50 Mg . A1c at the endocrinologist was 9.1. Also states is post breast biopsy, was advised with benign findings, was advised to get a lumpectomy done, patient is reluctant, because of ongoing other medical issues.  Patient and daughter want to have a conversation with the breast surgeon to do MRI monitoring instead. Patient plans to go visit her daughter in [facility] in the next coming few, and then plans to go to [place] by the end of May Today reports feels better, denies any dizziness chest pain shortness of breath.

## 2024-04-24 ENCOUNTER — RX CHANGE (OUTPATIENT)
Age: 74
End: 2024-04-24

## 2024-04-25 ENCOUNTER — RX RENEWAL (OUTPATIENT)
Age: 74
End: 2024-04-25

## 2024-04-25 DIAGNOSIS — E87.1 HYPO-OSMOLALITY AND HYPONATREMIA: ICD-10-CM

## 2024-04-25 RX ORDER — NORMAL SALT TABLETS 1 G/G
1 TABLET ORAL TWICE DAILY
Qty: 180 | Refills: 0 | Status: ACTIVE | COMMUNITY
Start: 2024-04-25 | End: 1900-01-01

## 2024-04-25 RX ORDER — CHLORHEXIDINE GLUCONATE 4 %
1000 LIQUID (ML) TOPICAL
Qty: 90 | Refills: 3 | Status: ACTIVE | COMMUNITY
Start: 2023-12-19 | End: 1900-01-01

## 2024-04-25 RX ORDER — BLOOD-GLUCOSE METER
W/DEVICE EACH MISCELLANEOUS
Qty: 1 | Refills: 0 | Status: ACTIVE | COMMUNITY
Start: 2024-02-26 | End: 1900-01-01

## 2024-05-06 ENCOUNTER — APPOINTMENT (OUTPATIENT)
Dept: UROGYNECOLOGY | Facility: CLINIC | Age: 74
End: 2024-05-06

## 2024-05-09 ENCOUNTER — APPOINTMENT (OUTPATIENT)
Dept: UROGYNECOLOGY | Facility: CLINIC | Age: 74
End: 2024-05-09
Payer: MEDICARE

## 2024-05-09 ENCOUNTER — APPOINTMENT (OUTPATIENT)
Dept: INTERNAL MEDICINE | Facility: CLINIC | Age: 74
End: 2024-05-09
Payer: MEDICARE

## 2024-05-09 VITALS
SYSTOLIC BLOOD PRESSURE: 122 MMHG | WEIGHT: 97 LBS | DIASTOLIC BLOOD PRESSURE: 72 MMHG | TEMPERATURE: 97.7 F | HEART RATE: 70 BPM | OXYGEN SATURATION: 98 % | HEIGHT: 59 IN | BODY MASS INDEX: 19.56 KG/M2

## 2024-05-09 VITALS
BODY MASS INDEX: 19.56 KG/M2 | HEIGHT: 59 IN | SYSTOLIC BLOOD PRESSURE: 138 MMHG | DIASTOLIC BLOOD PRESSURE: 76 MMHG | HEART RATE: 76 BPM | OXYGEN SATURATION: 98 % | WEIGHT: 97 LBS

## 2024-05-09 DIAGNOSIS — N32.81 OVERACTIVE BLADDER: ICD-10-CM

## 2024-05-09 DIAGNOSIS — E03.9 HYPOTHYROIDISM, UNSPECIFIED: ICD-10-CM

## 2024-05-09 PROCEDURE — 51798 US URINE CAPACITY MEASURE: CPT

## 2024-05-09 PROCEDURE — 99215 OFFICE O/P EST HI 40 MIN: CPT

## 2024-05-09 PROCEDURE — G2211 COMPLEX E/M VISIT ADD ON: CPT

## 2024-05-09 PROCEDURE — 99213 OFFICE O/P EST LOW 20 MIN: CPT

## 2024-05-09 RX ORDER — LEVOTHYROXINE SODIUM 0.1 MG/1
100 TABLET ORAL
Qty: 30 | Refills: 2 | Status: ACTIVE | COMMUNITY

## 2024-05-09 RX ORDER — ALENDRONATE SODIUM 70 MG/1
70 TABLET ORAL
Qty: 12 | Refills: 3 | Status: ACTIVE | COMMUNITY
Start: 2024-03-08 | End: 1900-01-01

## 2024-05-09 RX ORDER — LEVOTHYROXINE SODIUM 0.05 MG/1
50 TABLET ORAL DAILY
Qty: 90 | Refills: 0 | Status: ACTIVE | COMMUNITY

## 2024-05-09 RX ORDER — SIMVASTATIN 10 MG/1
10 TABLET, FILM COATED ORAL
Qty: 90 | Refills: 0 | Status: ACTIVE | COMMUNITY
Start: 2024-01-03 | End: 1900-01-01

## 2024-05-09 RX ORDER — LOSARTAN POTASSIUM 50 MG/1
50 TABLET, FILM COATED ORAL
Qty: 90 | Refills: 0 | Status: ACTIVE | COMMUNITY
Start: 2024-05-09 | End: 1900-01-01

## 2024-05-09 RX ORDER — LOSARTAN POTASSIUM 25 MG/1
25 TABLET, FILM COATED ORAL
Qty: 30 | Refills: 0 | Status: ACTIVE | COMMUNITY
Start: 2024-03-14

## 2024-05-09 RX ORDER — AMLODIPINE BESYLATE 10 MG/1
10 TABLET ORAL DAILY
Qty: 90 | Refills: 1 | Status: ACTIVE | COMMUNITY
Start: 2024-01-03 | End: 1900-01-01

## 2024-05-09 NOTE — PHYSICAL EXAM
[No Acute Distress] : in no acute distress [Well developed] : well developed [Well Nourished] : ~L well nourished [Good Hygeine] : demonstrates good hygeine [Oriented x3] : oriented to person, place, and time [Normal Memory] : ~T memory was ~L unimpaired [Normal Mood/Affect] : mood and affect are normal [Cough] : no cough [Normal Appearance] : ~T the appearance of the neck was normal [Warm and Dry] : was warm and dry to touch [Normal Gait] : gait was normal

## 2024-05-09 NOTE — HISTORY OF PRESENT ILLNESS
[FreeTextEntry1] : Pt presents to office with her daughter for f/u on OAB.  She did well with PNE and is scheduled for axonics full implant in October.  She is going to [place] next week and will be home at the end of September.  She was given samples of gemtesa at last visit to see if this helped control symptoms until she can proceed with SNM.  Today she reports about 60-70% improvement in her symptoms with the medication.  She denies any SE and denies any subjective feelings of incomplete emptying.  She is happy and would like to continue.  Per daughter, the medication costs about $500 and was hoping to get samples to hold her over.  Pt denies dysuria or any UTI symptoms today.

## 2024-05-09 NOTE — REVIEW OF SYSTEMS
[Fever] : no fever [Discharge] : no discharge [Earache] : no earache [Chest Pain] : no chest pain [Palpitations] : no palpitations [Shortness Of Breath] : no shortness of breath [Cough] : no cough [Abdominal Pain] : no abdominal pain [Vomiting] : no vomiting [Dysuria] : no dysuria [Joint Pain] : no joint pain [Itching] : no itching [Headache] : no headache [Suicidal] : suicidal

## 2024-05-09 NOTE — DISCUSSION/SUMMARY
[FreeTextEntry1] : Ok per Dr. Conti to supply samples until axonics implant.  She will f/u in October when she returns.  Instructed to call with any questions or concerns and she verbalizes understanding.

## 2024-05-09 NOTE — HISTORY OF PRESENT ILLNESS
[FreeTextEntry1] : f/u medication refill [de-identified] : 72 y/o F Pmhx DM2, HLD, HTN, depression who was hospitalized at [facility] due to feeling week and paresthesia of extremities, hyponatremia( causes hospitalizationin Dec/23 ), frequent BM(with stool soling),  LAst seen April.2024 Since last visit has returned from a trip visiting her daughter, doing well, plan going to [place]  , needs medication refill. No acute complains He tamara has started her on repaglinide 0.5 TID and c/w Janumet 50/1000 BID, her losartan was also increased to 50 mg

## 2024-05-09 NOTE — PHYSICAL EXAM
[No Acute Distress] : no acute distress [Normal Outer Ear/Nose] : the outer ears and nose were normal in appearance [No Respiratory Distress] : no respiratory distress  [Clear to Auscultation] : lungs were clear to auscultation bilaterally [Normal Rate] : normal rate  [Regular Rhythm] : with a regular rhythm [Normal S1, S2] : normal S1 and S2 [No Edema] : there was no peripheral edema [Soft] : abdomen soft [Non Tender] : non-tender [No Rash] : no rash [Normal Gait] : normal gait [Normal Affect] : the affect was normal

## 2024-05-15 ENCOUNTER — APPOINTMENT (OUTPATIENT)
Dept: ENDOCRINOLOGY | Facility: CLINIC | Age: 74
End: 2024-05-15
Payer: MEDICARE

## 2024-05-15 VITALS
BODY MASS INDEX: 19.56 KG/M2 | HEIGHT: 59 IN | HEART RATE: 90 BPM | WEIGHT: 97 LBS | SYSTOLIC BLOOD PRESSURE: 120 MMHG | DIASTOLIC BLOOD PRESSURE: 76 MMHG | OXYGEN SATURATION: 97 %

## 2024-05-15 DIAGNOSIS — E78.00 PURE HYPERCHOLESTEROLEMIA, UNSPECIFIED: ICD-10-CM

## 2024-05-15 DIAGNOSIS — E11.9 TYPE 2 DIABETES MELLITUS W/OUT COMPLICATIONS: ICD-10-CM

## 2024-05-15 DIAGNOSIS — I10 ESSENTIAL (PRIMARY) HYPERTENSION: ICD-10-CM

## 2024-05-15 LAB — GLUCOSE BLDC GLUCOMTR-MCNC: 186

## 2024-05-15 PROCEDURE — G2211 COMPLEX E/M VISIT ADD ON: CPT

## 2024-05-15 PROCEDURE — 82962 GLUCOSE BLOOD TEST: CPT

## 2024-05-15 PROCEDURE — 99213 OFFICE O/P EST LOW 20 MIN: CPT

## 2024-05-15 RX ORDER — BLOOD SUGAR DIAGNOSTIC
STRIP MISCELLANEOUS
Qty: 1 | Refills: 1 | Status: ACTIVE | COMMUNITY
Start: 2024-01-22 | End: 1900-01-01

## 2024-05-15 RX ORDER — SITAGLIPTIN AND METFORMIN HYDROCHLORIDE 50; 1000 MG/1; MG/1
50-1000 TABLET, FILM COATED, EXTENDED RELEASE ORAL
Qty: 60 | Refills: 0 | Status: DISCONTINUED | COMMUNITY
Start: 2024-01-03 | End: 2024-05-15

## 2024-05-15 RX ORDER — REPAGLINIDE 0.5 MG/1
0.5 TABLET ORAL 3 TIMES DAILY
Qty: 270 | Refills: 0 | Status: DISCONTINUED | COMMUNITY
Start: 2024-05-09 | End: 2024-05-15

## 2024-05-15 RX ORDER — PIOGLITAZONE HYDROCHLORIDE 15 MG/1
15 TABLET ORAL DAILY
Qty: 90 | Refills: 3 | Status: ACTIVE | COMMUNITY
Start: 2024-05-15 | End: 1900-01-01

## 2024-05-15 RX ORDER — LOSARTAN POTASSIUM 50 MG/1
50 TABLET, FILM COATED ORAL DAILY
Qty: 30 | Refills: 1 | Status: DISCONTINUED | COMMUNITY
Start: 2024-01-03 | End: 2024-05-15

## 2024-05-15 RX ORDER — REPAGLINIDE 2 MG/1
2 TABLET ORAL 3 TIMES DAILY
Qty: 270 | Refills: 3 | Status: ACTIVE | COMMUNITY
Start: 2024-03-14 | End: 1900-01-01

## 2024-05-15 RX ORDER — REPAGLINIDE 0.5 MG/1
0.5 TABLET ORAL
Qty: 60 | Refills: 0 | Status: DISCONTINUED | COMMUNITY
Start: 2024-03-14 | End: 2024-05-15

## 2024-05-15 NOTE — HISTORY OF PRESENT ILLNESS
[FreeTextEntry1] : 73 here for her for follow up of type 2 DM and hypothyroidism  Daughter help translating to [place]     History of DM: Diagnosed 2004 Severity: uncontrolled  Home regimen: Janumet 12.5-1000 BID  Diamicron (Gliclazide) 60 mg once daily, takes every day    Previous medications Levemir    Sugar checks: checks twice a daily mid 200s, with few 300s  Hypoglycemia: none  Eye doctor: No retinopathy Neuropathy: She has neuropathy  Kidney disease: denied    Smoking: none  Exercise: Walks around the house    Labs: A1c 9.3  History of Hypothyroidism. Diagnosed for 15 years  On LT 50  5 days a week, and 100 twice a week  Good pill technique (early in the morning on empty stomach)   All other ROS reviewed and are negative except for the above  All labs reviewed

## 2024-05-15 NOTE — ASSESSMENT
[FreeTextEntry1] : Type 2 DM with hyperglycemia  A1c 9.3    Goal A1c 7-8 Discussed the side effects of Metformin that can include GI side effects such as and not limited to diarrhea. Also, metformin, should be stopped if kidney function deteriorated due to the risk of lactic acidosis. Also, Metformin should be stopped for 3 days before iodinated contrast studies or surgeries.  We agreed on the following plan today. On Janumet  mg twice daily, continue  Continue Prandin  2 mg three times daily  Start Actos 15 mg daily  Continue to check sugars once daily  Continue on healthy diet. Continue to exercise. Please see an eye doctor Please see a foot doctor    Hypothyroidism  Clinically euthyroid Good pill technique (early in the morning on empty stomach) On LT4 50 5 days and 100 twice weekly, continue  TFT acceptable     HLD On statin Recheck LDL    HTN Bp acceptable here Continue Losartan to 50 mg daily  Continue Amlod 10     Osteoporosis  Fosamax with Rheumatology started in 3/2024  I spent 30 minutes discussing with patient face to face and non face to face reviewing documentations, labs, and/or imaging, also discussing the management plans.   RTC in 4-5 months

## 2024-05-16 ENCOUNTER — APPOINTMENT (OUTPATIENT)
Dept: ENDOCRINOLOGY | Facility: CLINIC | Age: 74
End: 2024-05-16

## 2024-05-27 ENCOUNTER — RX RENEWAL (OUTPATIENT)
Age: 74
End: 2024-05-27

## 2024-05-27 RX ORDER — SITAGLIPTIN AND METFORMIN HYDROCHLORIDE 50; 1000 MG/1; MG/1
50-1000 TABLET, FILM COATED, EXTENDED RELEASE ORAL
Qty: 60 | Refills: 0 | Status: ACTIVE | COMMUNITY
Start: 2024-01-03 | End: 1900-01-01

## 2024-05-29 ENCOUNTER — APPOINTMENT (OUTPATIENT)
Dept: UROGYNECOLOGY | Facility: CLINIC | Age: 74
End: 2024-05-29

## 2024-06-03 ENCOUNTER — RX RENEWAL (OUTPATIENT)
Age: 74
End: 2024-06-03

## 2024-06-03 RX ORDER — SITAGLIPTIN AND METFORMIN HYDROCHLORIDE 100; 1000 MG/1; MG/1
100-1000 TABLET, FILM COATED, EXTENDED RELEASE ORAL DAILY
Qty: 90 | Refills: 0 | Status: ACTIVE | COMMUNITY
Start: 2024-03-04 | End: 1900-01-01

## 2024-07-30 ENCOUNTER — RX RENEWAL (OUTPATIENT)
Age: 74
End: 2024-07-30

## 2024-10-02 ENCOUNTER — APPOINTMENT (OUTPATIENT)
Dept: ENDOCRINOLOGY | Facility: CLINIC | Age: 74
End: 2024-10-02

## 2024-10-21 NOTE — ED PROVIDER NOTE - NSCAREINITIATED _GEN_ER
Potential access sites were evaluated for patency using ultrasound.   The left femoral artery was selected. Access was obtained under with Sonosite guidance using a micropuncture 21 gauge needle with direct visualization of needle entry.    Ricardo Schmitz(Resident)

## 2024-11-25 ENCOUNTER — APPOINTMENT (OUTPATIENT)
Dept: INTERNAL MEDICINE | Facility: CLINIC | Age: 74
End: 2024-11-25
Payer: MEDICARE

## 2024-11-25 VITALS
BODY MASS INDEX: 20.16 KG/M2 | HEIGHT: 59 IN | SYSTOLIC BLOOD PRESSURE: 134 MMHG | TEMPERATURE: 98.4 F | DIASTOLIC BLOOD PRESSURE: 68 MMHG | WEIGHT: 100 LBS | HEART RATE: 80 BPM | OXYGEN SATURATION: 98 %

## 2024-11-25 DIAGNOSIS — I10 ESSENTIAL (PRIMARY) HYPERTENSION: ICD-10-CM

## 2024-11-25 DIAGNOSIS — M25.569 PAIN IN UNSPECIFIED KNEE: ICD-10-CM

## 2024-11-25 DIAGNOSIS — E11.9 TYPE 2 DIABETES MELLITUS W/OUT COMPLICATIONS: ICD-10-CM

## 2024-11-25 DIAGNOSIS — E87.1 HYPO-OSMOLALITY AND HYPONATREMIA: ICD-10-CM

## 2024-11-25 DIAGNOSIS — E03.9 HYPOTHYROIDISM, UNSPECIFIED: ICD-10-CM

## 2024-11-25 DIAGNOSIS — Z23 ENCOUNTER FOR IMMUNIZATION: ICD-10-CM

## 2024-11-25 DIAGNOSIS — R92.8 OTHER ABNORMAL AND INCONCLUSIVE FINDINGS ON DIAGNOSTIC IMAGING OF BREAST: ICD-10-CM

## 2024-11-25 PROCEDURE — G0008: CPT

## 2024-11-25 PROCEDURE — 99214 OFFICE O/P EST MOD 30 MIN: CPT

## 2024-11-25 PROCEDURE — G2211 COMPLEX E/M VISIT ADD ON: CPT

## 2024-11-25 PROCEDURE — 90662 IIV NO PRSV INCREASED AG IM: CPT

## 2024-12-03 ENCOUNTER — APPOINTMENT (OUTPATIENT)
Dept: BREAST CENTER | Facility: CLINIC | Age: 74
End: 2024-12-03

## 2024-12-05 ENCOUNTER — NON-APPOINTMENT (OUTPATIENT)
Age: 74
End: 2024-12-05

## 2024-12-05 ENCOUNTER — LABORATORY RESULT (OUTPATIENT)
Age: 74
End: 2024-12-05

## 2024-12-05 LAB
BASOPHILS # BLD AUTO: 0.09 K/UL
BASOPHILS NFR BLD AUTO: 1.3 %
EOSINOPHIL # BLD AUTO: 0.44 K/UL
EOSINOPHIL NFR BLD AUTO: 6.2 %
HCT VFR BLD CALC: 36.4 %
HGB BLD-MCNC: 12.1 G/DL
IMM GRANULOCYTES NFR BLD AUTO: 0.7 %
LYMPHOCYTES # BLD AUTO: 2.19 K/UL
LYMPHOCYTES NFR BLD AUTO: 30.6 %
MAN DIFF?: NORMAL
MCHC RBC-ENTMCNC: 26.9 PG
MCHC RBC-ENTMCNC: 33.2 G/DL
MCV RBC AUTO: 81.1 FL
MONOCYTES # BLD AUTO: 0.58 K/UL
MONOCYTES NFR BLD AUTO: 8.1 %
NEUTROPHILS # BLD AUTO: 3.8 K/UL
NEUTROPHILS NFR BLD AUTO: 53.1 %
PLATELET # BLD AUTO: 372 K/UL
RBC # BLD: 4.49 M/UL
RBC # FLD: 14 %
TSH SERPL-ACNC: 4.5 UIU/ML
WBC # FLD AUTO: 7.15 K/UL

## 2024-12-09 ENCOUNTER — NON-APPOINTMENT (OUTPATIENT)
Age: 74
End: 2024-12-09

## 2024-12-10 ENCOUNTER — APPOINTMENT (OUTPATIENT)
Dept: ENDOCRINOLOGY | Facility: CLINIC | Age: 74
End: 2024-12-10
Payer: MEDICARE

## 2024-12-10 VITALS
OXYGEN SATURATION: 97 % | BODY MASS INDEX: 20.16 KG/M2 | WEIGHT: 100 LBS | HEART RATE: 78 BPM | DIASTOLIC BLOOD PRESSURE: 60 MMHG | SYSTOLIC BLOOD PRESSURE: 148 MMHG | HEIGHT: 59 IN

## 2024-12-10 DIAGNOSIS — M81.0 AGE-RELATED OSTEOPOROSIS W/OUT CURRENT PATHOLOGICAL FRACTURE: ICD-10-CM

## 2024-12-10 DIAGNOSIS — E03.9 HYPOTHYROIDISM, UNSPECIFIED: ICD-10-CM

## 2024-12-10 DIAGNOSIS — E11.9 TYPE 2 DIABETES MELLITUS W/OUT COMPLICATIONS: ICD-10-CM

## 2024-12-10 PROCEDURE — G2211 COMPLEX E/M VISIT ADD ON: CPT

## 2024-12-10 PROCEDURE — 99214 OFFICE O/P EST MOD 30 MIN: CPT

## 2024-12-12 RX ORDER — ORAL SEMAGLUTIDE 3 MG/1
3 TABLET ORAL
Qty: 30 | Refills: 0 | Status: ACTIVE | COMMUNITY
Start: 2024-12-10 | End: 1900-01-01

## 2024-12-21 ENCOUNTER — NON-APPOINTMENT (OUTPATIENT)
Age: 74
End: 2024-12-21

## 2024-12-21 RX ORDER — METFORMIN ER 500 MG 500 MG/1
500 TABLET ORAL
Qty: 180 | Refills: 0 | Status: ACTIVE | COMMUNITY
Start: 2024-12-20 | End: 1900-01-01

## 2025-01-08 ENCOUNTER — APPOINTMENT (OUTPATIENT)
Dept: ENDOCRINOLOGY | Facility: CLINIC | Age: 75
End: 2025-01-08
Payer: MEDICARE

## 2025-01-08 DIAGNOSIS — E11.9 TYPE 2 DIABETES MELLITUS W/OUT COMPLICATIONS: ICD-10-CM

## 2025-01-08 PROCEDURE — 97803 MED NUTRITION INDIV SUBSEQ: CPT | Mod: GA

## 2025-01-24 ENCOUNTER — APPOINTMENT (OUTPATIENT)
Dept: INTERNAL MEDICINE | Facility: CLINIC | Age: 75
End: 2025-01-24
Payer: MEDICARE

## 2025-01-24 VITALS
BODY MASS INDEX: 20.36 KG/M2 | DIASTOLIC BLOOD PRESSURE: 76 MMHG | TEMPERATURE: 98 F | WEIGHT: 101 LBS | HEART RATE: 90 BPM | OXYGEN SATURATION: 97 % | SYSTOLIC BLOOD PRESSURE: 134 MMHG | HEIGHT: 59 IN

## 2025-01-24 DIAGNOSIS — H81.10 BENIGN PAROXYSMAL VERTIGO, UNSPECIFIED EAR: ICD-10-CM

## 2025-01-24 DIAGNOSIS — E11.9 TYPE 2 DIABETES MELLITUS W/OUT COMPLICATIONS: ICD-10-CM

## 2025-01-24 DIAGNOSIS — Z09 ENCOUNTER FOR FOLLOW-UP EXAMINATION AFTER COMPLETED TREATMENT FOR CONDITIONS OTHER THAN MALIGNANT NEOPLASM: ICD-10-CM

## 2025-01-24 DIAGNOSIS — E78.00 PURE HYPERCHOLESTEROLEMIA, UNSPECIFIED: ICD-10-CM

## 2025-01-24 DIAGNOSIS — M54.9 DORSALGIA, UNSPECIFIED: ICD-10-CM

## 2025-01-24 DIAGNOSIS — E87.1 HYPO-OSMOLALITY AND HYPONATREMIA: ICD-10-CM

## 2025-01-24 PROCEDURE — G2211 COMPLEX E/M VISIT ADD ON: CPT

## 2025-01-24 PROCEDURE — 99215 OFFICE O/P EST HI 40 MIN: CPT

## 2025-01-25 PROBLEM — M54.9 BACK PAIN: Status: ACTIVE | Noted: 2025-01-25

## 2025-01-28 ENCOUNTER — APPOINTMENT (OUTPATIENT)
Dept: NEUROLOGY | Facility: CLINIC | Age: 75
End: 2025-01-28
Payer: MEDICARE

## 2025-01-28 VITALS
SYSTOLIC BLOOD PRESSURE: 144 MMHG | HEIGHT: 59 IN | DIASTOLIC BLOOD PRESSURE: 77 MMHG | HEART RATE: 72 BPM | WEIGHT: 100 LBS | BODY MASS INDEX: 20.16 KG/M2

## 2025-01-28 DIAGNOSIS — R42 DIZZINESS AND GIDDINESS: ICD-10-CM

## 2025-01-28 DIAGNOSIS — I51.7 CARDIOMEGALY: ICD-10-CM

## 2025-01-28 PROCEDURE — 99204 OFFICE O/P NEW MOD 45 MIN: CPT

## 2025-01-30 ENCOUNTER — APPOINTMENT (OUTPATIENT)
Dept: MRI IMAGING | Facility: CLINIC | Age: 75
End: 2025-01-30
Payer: MEDICARE

## 2025-01-30 ENCOUNTER — OUTPATIENT (OUTPATIENT)
Dept: OUTPATIENT SERVICES | Facility: HOSPITAL | Age: 75
LOS: 1 days | End: 2025-01-30

## 2025-01-30 DIAGNOSIS — I51.7 CARDIOMEGALY: ICD-10-CM

## 2025-01-30 PROCEDURE — 70551 MRI BRAIN STEM W/O DYE: CPT | Mod: 26

## 2025-01-30 PROCEDURE — 70544 MR ANGIOGRAPHY HEAD W/O DYE: CPT | Mod: 26,59

## 2025-02-02 NOTE — ED ADULT NURSE NOTE - NSFALLRISKASMTTYPE_ED_ALL_ED
PROCEDURE NOTE  Date: 2/2/2025   Name: Seema SANCHEZ Jaycee  YOB: 1963    Procedures  EKG completed          Initial (On Arrival)

## 2025-02-03 RX ORDER — ELECTROLYTES/DEXTROSE
32G X 4 MM SOLUTION, ORAL ORAL
Qty: 100 | Refills: 0 | Status: ACTIVE | COMMUNITY
Start: 2025-01-31 | End: 1900-01-01

## 2025-02-04 ENCOUNTER — APPOINTMENT (OUTPATIENT)
Dept: NEUROSURGERY | Facility: CLINIC | Age: 75
End: 2025-02-04
Payer: MEDICARE

## 2025-02-04 VITALS
BODY MASS INDEX: 20.16 KG/M2 | TEMPERATURE: 97.5 F | HEIGHT: 59 IN | DIASTOLIC BLOOD PRESSURE: 80 MMHG | HEART RATE: 80 BPM | OXYGEN SATURATION: 98 % | WEIGHT: 100 LBS | SYSTOLIC BLOOD PRESSURE: 147 MMHG

## 2025-02-04 DIAGNOSIS — R26.9 UNSPECIFIED ABNORMALITIES OF GAIT AND MOBILITY: ICD-10-CM

## 2025-02-04 DIAGNOSIS — R26.81 UNSTEADINESS ON FEET: ICD-10-CM

## 2025-02-04 DIAGNOSIS — G91.2 (IDIOPATHIC) NORMAL PRESSURE HYDROCEPHALUS: ICD-10-CM

## 2025-02-04 PROCEDURE — 99203 OFFICE O/P NEW LOW 30 MIN: CPT

## 2025-02-05 ENCOUNTER — APPOINTMENT (OUTPATIENT)
Dept: ENDOCRINOLOGY | Facility: CLINIC | Age: 75
End: 2025-02-05
Payer: MEDICARE

## 2025-02-05 VITALS
WEIGHT: 100 LBS | OXYGEN SATURATION: 98 % | HEART RATE: 75 BPM | BODY MASS INDEX: 20.16 KG/M2 | DIASTOLIC BLOOD PRESSURE: 60 MMHG | SYSTOLIC BLOOD PRESSURE: 120 MMHG | HEIGHT: 59 IN

## 2025-02-05 DIAGNOSIS — E11.9 TYPE 2 DIABETES MELLITUS W/OUT COMPLICATIONS: ICD-10-CM

## 2025-02-05 DIAGNOSIS — M81.0 AGE-RELATED OSTEOPOROSIS W/OUT CURRENT PATHOLOGICAL FRACTURE: ICD-10-CM

## 2025-02-05 DIAGNOSIS — E03.9 HYPOTHYROIDISM, UNSPECIFIED: ICD-10-CM

## 2025-02-05 DIAGNOSIS — E78.00 PURE HYPERCHOLESTEROLEMIA, UNSPECIFIED: ICD-10-CM

## 2025-02-05 DIAGNOSIS — I10 ESSENTIAL (PRIMARY) HYPERTENSION: ICD-10-CM

## 2025-02-05 LAB — GLUCOSE BLDC GLUCOMTR-MCNC: 322

## 2025-02-05 PROCEDURE — 82962 GLUCOSE BLOOD TEST: CPT

## 2025-02-05 PROCEDURE — 99214 OFFICE O/P EST MOD 30 MIN: CPT

## 2025-02-05 PROCEDURE — G2211 COMPLEX E/M VISIT ADD ON: CPT

## 2025-02-06 RX ORDER — INSULIN GLARGINE 100 [IU]/ML
100 INJECTION, SOLUTION SUBCUTANEOUS
Qty: 1 | Refills: 1 | Status: DISCONTINUED | COMMUNITY
Start: 2025-01-31 | End: 2025-02-06

## 2025-02-06 RX ORDER — INSULIN GLARGINE-YFGN 100 [IU]/ML
100 INJECTION, SOLUTION SUBCUTANEOUS
Qty: 3 | Refills: 0 | Status: ACTIVE | COMMUNITY
Start: 2025-02-06 | End: 1900-01-01

## 2025-02-13 ENCOUNTER — APPOINTMENT (OUTPATIENT)
Dept: MRI IMAGING | Facility: CLINIC | Age: 75
End: 2025-02-13
Payer: MEDICARE

## 2025-02-13 PROCEDURE — 72141 MRI NECK SPINE W/O DYE: CPT

## 2025-02-13 PROCEDURE — 72148 MRI LUMBAR SPINE W/O DYE: CPT

## 2025-02-13 PROCEDURE — 72146 MRI CHEST SPINE W/O DYE: CPT

## 2025-02-14 ENCOUNTER — RX RENEWAL (OUTPATIENT)
Age: 75
End: 2025-02-14

## 2025-02-21 ENCOUNTER — APPOINTMENT (OUTPATIENT)
Dept: INTERNAL MEDICINE | Facility: CLINIC | Age: 75
End: 2025-02-21
Payer: MEDICARE

## 2025-02-21 VITALS
DIASTOLIC BLOOD PRESSURE: 68 MMHG | BODY MASS INDEX: 19.76 KG/M2 | SYSTOLIC BLOOD PRESSURE: 138 MMHG | TEMPERATURE: 97.2 F | WEIGHT: 98 LBS | OXYGEN SATURATION: 98 % | HEIGHT: 59 IN | HEART RATE: 86 BPM

## 2025-02-21 DIAGNOSIS — E11.9 TYPE 2 DIABETES MELLITUS W/OUT COMPLICATIONS: ICD-10-CM

## 2025-02-21 DIAGNOSIS — D64.9 ANEMIA, UNSPECIFIED: ICD-10-CM

## 2025-02-21 DIAGNOSIS — R42 DIZZINESS AND GIDDINESS: ICD-10-CM

## 2025-02-21 DIAGNOSIS — I10 ESSENTIAL (PRIMARY) HYPERTENSION: ICD-10-CM

## 2025-02-21 DIAGNOSIS — R90.89 OTHER ABNORMAL FINDINGS ON DIAGNOSTIC IMAGING OF CENTRAL NERVOUS SYSTEM: ICD-10-CM

## 2025-02-21 DIAGNOSIS — E03.9 HYPOTHYROIDISM, UNSPECIFIED: ICD-10-CM

## 2025-02-21 PROCEDURE — 99214 OFFICE O/P EST MOD 30 MIN: CPT

## 2025-02-21 RX ORDER — INSULIN DEGLUDEC INJECTION 100 U/ML
100 INJECTION, SOLUTION SUBCUTANEOUS
Qty: 1 | Refills: 1 | Status: ACTIVE | COMMUNITY
Start: 2025-02-21 | End: 1900-01-01

## 2025-02-24 ENCOUNTER — APPOINTMENT (OUTPATIENT)
Dept: NEUROSURGERY | Facility: CLINIC | Age: 75
End: 2025-02-24

## 2025-03-06 ENCOUNTER — APPOINTMENT (OUTPATIENT)
Dept: ENDOCRINOLOGY | Facility: CLINIC | Age: 75
End: 2025-03-06

## 2025-03-18 ENCOUNTER — RX RENEWAL (OUTPATIENT)
Age: 75
End: 2025-03-18

## 2025-03-25 ENCOUNTER — RX RENEWAL (OUTPATIENT)
Age: 75
End: 2025-03-25

## 2025-04-12 NOTE — H&P ADULT - NSICDXPASTMEDICALHX_GEN_ALL_CORE_FT
No (0)
PAST MEDICAL HISTORY:  Benign essential HTN     HLD (hyperlipidemia)     Hyponatremia     Primary hypothyroidism     Type 2 diabetes mellitus

## 2025-06-20 ENCOUNTER — RX RENEWAL (OUTPATIENT)
Age: 75
End: 2025-06-20

## 2025-07-07 ENCOUNTER — RX RENEWAL (OUTPATIENT)
Age: 75
End: 2025-07-07